# Patient Record
Sex: FEMALE | Race: OTHER | Employment: OTHER | URBAN - METROPOLITAN AREA
[De-identification: names, ages, dates, MRNs, and addresses within clinical notes are randomized per-mention and may not be internally consistent; named-entity substitution may affect disease eponyms.]

---

## 2017-01-02 ENCOUNTER — APPOINTMENT (OUTPATIENT)
Dept: GENERAL RADIOLOGY | Facility: HOSPITAL | Age: 67
End: 2017-01-02

## 2017-01-02 ENCOUNTER — HOSPITAL ENCOUNTER (EMERGENCY)
Facility: HOSPITAL | Age: 67
Discharge: HOME | End: 2017-01-02

## 2017-01-02 VITALS
RESPIRATION RATE: 18 BRPM | DIASTOLIC BLOOD PRESSURE: 76 MMHG | TEMPERATURE: 98 F | HEART RATE: 60 BPM | WEIGHT: 140 LBS | SYSTOLIC BLOOD PRESSURE: 153 MMHG | HEIGHT: 63 IN | OXYGEN SATURATION: 96 % | BODY MASS INDEX: 24.8 KG/M2

## 2017-01-02 DIAGNOSIS — R07.9 CHEST PAIN OF UNCERTAIN ETIOLOGY: Primary | ICD-10-CM

## 2017-01-02 DIAGNOSIS — R11.2 NON-INTRACTABLE VOMITING WITH NAUSEA, UNSPECIFIED VOMITING TYPE: ICD-10-CM

## 2017-01-02 LAB
ANION GAP SERPL CALC-SCNC: 8 MMOL/L (ref 0–18)
BASOPHILS # BLD: 0.1 K/UL (ref 0–0.2)
BASOPHILS NFR BLD: 1 %
BUN SERPL-MCNC: 9 MG/DL (ref 8–20)
BUN/CREAT SERPL: 18.4 (ref 10–20)
CALCIUM SERPL-MCNC: 8.5 MG/DL (ref 8.5–10.5)
CHLORIDE SERPL-SCNC: 102 MMOL/L (ref 95–110)
CO2 SERPL-SCNC: 23 MMOL/L (ref 22–32)
CREAT SERPL-MCNC: 0.49 MG/DL (ref 0.5–1.5)
EOSINOPHIL # BLD: 0.2 K/UL (ref 0–0.7)
EOSINOPHIL NFR BLD: 2 %
ERYTHROCYTE [DISTWIDTH] IN BLOOD BY AUTOMATED COUNT: 13.3 % (ref 11–15)
GLUCOSE SERPL-MCNC: 107 MG/DL (ref 70–99)
HCT VFR BLD AUTO: 38.9 % (ref 35–48)
HGB BLD-MCNC: 13 G/DL (ref 12–16)
LYMPHOCYTES # BLD: 2.7 K/UL (ref 1–4)
LYMPHOCYTES NFR BLD: 32 %
MCH RBC QN AUTO: 30 PG (ref 27–32)
MCHC RBC AUTO-ENTMCNC: 33.4 G/DL (ref 32–37)
MCV RBC AUTO: 89.8 FL (ref 80–100)
MONOCYTES # BLD: 0.6 K/UL (ref 0–1)
MONOCYTES NFR BLD: 8 %
NEUTROPHILS # BLD AUTO: 4.8 K/UL (ref 1.8–7.7)
NEUTROPHILS NFR BLD: 57 %
OSMOLALITY UR CALC.SUM OF ELEC: 275 MOSM/KG (ref 275–295)
PLATELET # BLD AUTO: 250 K/UL (ref 140–400)
PMV BLD AUTO: 7.7 FL (ref 7.4–10.3)
POTASSIUM SERPL-SCNC: 3.5 MMOL/L (ref 3.3–5.1)
RBC # BLD AUTO: 4.33 M/UL (ref 3.7–5.4)
SODIUM SERPL-SCNC: 133 MMOL/L (ref 136–144)
TROPONIN I SERPL-MCNC: 0 NG/ML (ref ?–0.03)
TROPONIN I SERPL-MCNC: 0.01 NG/ML (ref ?–0.03)
WBC # BLD AUTO: 8.4 K/UL (ref 4–11)

## 2017-01-02 PROCEDURE — 93010 ELECTROCARDIOGRAM REPORT: CPT | Performed by: INTERNAL MEDICINE

## 2017-01-02 PROCEDURE — 93010 ELECTROCARDIOGRAM REPORT: CPT | Performed by: EMERGENCY MEDICINE

## 2017-01-02 PROCEDURE — 84484 ASSAY OF TROPONIN QUANT: CPT

## 2017-01-02 PROCEDURE — 85025 COMPLETE CBC W/AUTO DIFF WBC: CPT

## 2017-01-02 PROCEDURE — 80048 BASIC METABOLIC PNL TOTAL CA: CPT

## 2017-01-02 PROCEDURE — 93005 ELECTROCARDIOGRAM TRACING: CPT

## 2017-01-02 PROCEDURE — 84484 ASSAY OF TROPONIN QUANT: CPT | Performed by: EMERGENCY MEDICINE

## 2017-01-02 PROCEDURE — 99285 EMERGENCY DEPT VISIT HI MDM: CPT

## 2017-01-02 PROCEDURE — 96361 HYDRATE IV INFUSION ADD-ON: CPT

## 2017-01-02 PROCEDURE — 96374 THER/PROPH/DIAG INJ IV PUSH: CPT

## 2017-01-02 PROCEDURE — 71020 XR CHEST PA + LAT CHEST (CPT=71020): CPT

## 2017-01-02 RX ORDER — ONDANSETRON 4 MG/1
4 TABLET, ORALLY DISINTEGRATING ORAL EVERY 4 HOURS PRN
Qty: 15 TABLET | Refills: 0 | Status: SHIPPED | OUTPATIENT
Start: 2017-01-02 | End: 2017-02-15 | Stop reason: ALTCHOICE

## 2017-01-02 RX ORDER — ONDANSETRON 2 MG/ML
4 INJECTION INTRAMUSCULAR; INTRAVENOUS ONCE
Status: COMPLETED | OUTPATIENT
Start: 2017-01-02 | End: 2017-01-02

## 2017-01-02 NOTE — ED INITIAL ASSESSMENT (HPI)
Pt c/o dizziness, weakness, chest pain and left shoulder pain, denies sweating denies sob c/o vomit x 1 nausea now

## 2017-01-03 ENCOUNTER — TELEPHONE (OUTPATIENT)
Dept: INTERNAL MEDICINE CLINIC | Facility: CLINIC | Age: 67
End: 2017-01-03

## 2017-01-03 NOTE — ED PROVIDER NOTES
Patient Seen in: [facility] AND [facility] Emergency Department    History   Patient presents with:  Chest Pain Angina (cardiovascular)      HPI    Patient presents complaining of intermittent vomiting over the past several days, nausea, mild weakness and dizzin 38   • Cancer Paternal Uncle      brain   • Ovarian Cancer Sister      25       Smoking Status: Social History    Marital Status:              Spouse Name:                       Years of Education:                 Number of children:               S normal.   Eyes: Conjunctivae are normal. Right eye exhibits no discharge. Left eye exhibits no discharge. Neck: Normal range of motion. Neck supple. Cardiovascular: Normal rate, regular rhythm and intact distal pulses. No murmur heard.   Pulmonary/Ch Scoliosis and degenerative changes in the thoracic spine and upper lumbar spine.          MDM     Pulse Ox: 96%, Room air, Normal     Monitor Interpretation:   normal sinus rhythm    Differential Diagnosis: Vomiting, gastroenteritis, chest pain, atypical ch

## 2017-01-03 NOTE — TELEPHONE ENCOUNTER
Patient was in ER over weekend, please call to schedule patient with me in the next 1 week (sooner if she is not feeling well)

## 2017-01-03 NOTE — ED NOTES
Care assumed. Assessment completed. Alert and interactive. Hemodynamically stable. States recent episodes of palpitations for which she is following up with a cardiologist  - states today anterior CP that radiates to L shoulder with associated N/V.  Denies

## 2017-01-04 NOTE — TELEPHONE ENCOUNTER
Spoke to pt. She is feeling better since her visit to ER. She does not want to make appt with Dr Debara Bosworth till after she sees her GI DR.  She will call us than  To clinical

## 2017-01-05 ENCOUNTER — TELEPHONE (OUTPATIENT)
Dept: GASTROENTEROLOGY | Facility: CLINIC | Age: 67
End: 2017-01-05

## 2017-01-07 ENCOUNTER — TELEPHONE (OUTPATIENT)
Dept: FAMILY MEDICINE CLINIC | Facility: CLINIC | Age: 67
End: 2017-01-07

## 2017-01-09 RX ORDER — TIMOLOL MALEATE 10 MG/1
TABLET ORAL
Qty: 180 TABLET | Refills: 0 | Status: SHIPPED | OUTPATIENT
Start: 2017-01-09 | End: 2017-01-31

## 2017-01-09 RX ORDER — OMEPRAZOLE 20 MG/1
CAPSULE, DELAYED RELEASE ORAL
Qty: 90 CAPSULE | Refills: 0 | Status: SHIPPED | OUTPATIENT
Start: 2017-01-09 | End: 2017-01-31

## 2017-01-11 ENCOUNTER — PRIOR ORIGINAL RECORDS (OUTPATIENT)
Dept: OTHER | Age: 67
End: 2017-01-11

## 2017-01-11 ENCOUNTER — MYAURORA ACCOUNT LINK (OUTPATIENT)
Dept: OTHER | Age: 67
End: 2017-01-11

## 2017-01-12 ENCOUNTER — MYAURORA ACCOUNT LINK (OUTPATIENT)
Dept: OTHER | Age: 67
End: 2017-01-12

## 2017-01-16 ENCOUNTER — PRIOR ORIGINAL RECORDS (OUTPATIENT)
Dept: OTHER | Age: 67
End: 2017-01-16

## 2017-01-18 ENCOUNTER — LAB REQUISITION (OUTPATIENT)
Dept: LAB | Facility: HOSPITAL | Age: 67
End: 2017-01-18
Payer: COMMERCIAL

## 2017-01-18 ENCOUNTER — TELEPHONE (OUTPATIENT)
Dept: GASTROENTEROLOGY | Facility: CLINIC | Age: 67
End: 2017-01-18

## 2017-01-18 DIAGNOSIS — Z01.89 ENCOUNTER FOR OTHER SPECIFIED SPECIAL EXAMINATIONS: ICD-10-CM

## 2017-01-18 PROCEDURE — 88305 TISSUE EXAM BY PATHOLOGIST: CPT | Performed by: INTERNAL MEDICINE

## 2017-01-18 PROCEDURE — 88312 SPECIAL STAINS GROUP 1: CPT | Performed by: INTERNAL MEDICINE

## 2017-01-18 NOTE — TELEPHONE ENCOUNTER
Post colonoscopy and egd([facility]):    Pre: colon screening, chronic heartburn and GERD  Post: diverticulosis (left) , mild melanosis coli, small int hemorrhoids, gastritis s/p biopsies, hiatal hernia.      GI rn please recall colonoscopy for 10 years

## 2017-01-20 ENCOUNTER — PRIOR ORIGINAL RECORDS (OUTPATIENT)
Dept: OTHER | Age: 67
End: 2017-01-20

## 2017-01-20 ENCOUNTER — TELEPHONE (OUTPATIENT)
Dept: GASTROENTEROLOGY | Facility: CLINIC | Age: 67
End: 2017-01-20

## 2017-01-31 RX ORDER — OMEPRAZOLE 20 MG/1
CAPSULE, DELAYED RELEASE ORAL
Qty: 90 CAPSULE | Refills: 0 | Status: SHIPPED | OUTPATIENT
Start: 2017-01-31 | End: 2017-04-27

## 2017-01-31 RX ORDER — TIMOLOL MALEATE 10 MG/1
TABLET ORAL
Qty: 180 TABLET | Refills: 0 | Status: SHIPPED | OUTPATIENT
Start: 2017-01-31 | End: 2017-04-27

## 2017-01-31 NOTE — TELEPHONE ENCOUNTER
Scripts resent to [facility] per Pt instructions as they were previously done by her old PCP and sent to [facility] which Pt no longer uses

## 2017-01-31 NOTE — TELEPHONE ENCOUNTER
Pt now sees Dr Brigette Amezcua for pcp and use [facility] mail  Rx refill request for Omeprazole cap 20 mg & Timolol mal tab 10 mg  To Rx

## 2017-02-08 RX ORDER — PREDNISONE 1 MG/1
TABLET ORAL
Qty: 90 TABLET | Refills: 0 | Status: SHIPPED | OUTPATIENT
Start: 2017-02-08 | End: 2017-03-03

## 2017-02-11 ENCOUNTER — LAB ENCOUNTER (OUTPATIENT)
Dept: LAB | Facility: HOSPITAL | Age: 67
End: 2017-02-11
Attending: INTERNAL MEDICINE
Payer: COMMERCIAL

## 2017-02-11 DIAGNOSIS — M06.09 RHEUMATOID ARTHRITIS OF MULTIPLE SITES WITH NEGATIVE RHEUMATOID FACTOR (HCC): ICD-10-CM

## 2017-02-11 DIAGNOSIS — Z51.81 ENCOUNTER FOR THERAPEUTIC DRUG MONITORING: ICD-10-CM

## 2017-02-11 LAB
ALBUMIN SERPL BCP-MCNC: 3.2 G/DL (ref 3.5–4.8)
AST SERPL-CCNC: 19 U/L (ref 15–41)
BASOPHILS # BLD: 0 K/UL (ref 0–0.2)
BASOPHILS NFR BLD: 1 %
CREAT SERPL-MCNC: 0.8 MG/DL (ref 0.5–1.5)
CRP SERPL-MCNC: <0.5 MG/DL (ref 0–0.9)
EOSINOPHIL # BLD: 0.1 K/UL (ref 0–0.7)
EOSINOPHIL NFR BLD: 2 %
ERYTHROCYTE [DISTWIDTH] IN BLOOD BY AUTOMATED COUNT: 13.6 % (ref 11–15)
ERYTHROCYTE [SEDIMENTATION RATE] IN BLOOD: 14 MM/HR (ref 0–30)
HCT VFR BLD AUTO: 38.3 % (ref 35–48)
HGB BLD-MCNC: 12.7 G/DL (ref 12–16)
LYMPHOCYTES # BLD: 2.6 K/UL (ref 1–4)
LYMPHOCYTES NFR BLD: 39 %
MCH RBC QN AUTO: 30.5 PG (ref 27–32)
MCHC RBC AUTO-ENTMCNC: 33.1 G/DL (ref 32–37)
MCV RBC AUTO: 92.1 FL (ref 80–100)
MONOCYTES # BLD: 0.6 K/UL (ref 0–1)
MONOCYTES NFR BLD: 9 %
NEUTROPHILS # BLD AUTO: 3.3 K/UL (ref 1.8–7.7)
NEUTROPHILS NFR BLD: 50 %
PLATELET # BLD AUTO: 244 K/UL (ref 140–400)
PMV BLD AUTO: 8.5 FL (ref 7.4–10.3)
RBC # BLD AUTO: 4.16 M/UL (ref 3.7–5.4)
WBC # BLD AUTO: 6.6 K/UL (ref 4–11)

## 2017-02-11 PROCEDURE — 82565 ASSAY OF CREATININE: CPT

## 2017-02-11 PROCEDURE — 85025 COMPLETE CBC W/AUTO DIFF WBC: CPT

## 2017-02-11 PROCEDURE — 82040 ASSAY OF SERUM ALBUMIN: CPT

## 2017-02-11 PROCEDURE — 85652 RBC SED RATE AUTOMATED: CPT

## 2017-02-11 PROCEDURE — 36415 COLL VENOUS BLD VENIPUNCTURE: CPT

## 2017-02-11 PROCEDURE — 86140 C-REACTIVE PROTEIN: CPT

## 2017-02-11 PROCEDURE — 84450 TRANSFERASE (AST) (SGOT): CPT

## 2017-02-15 ENCOUNTER — TELEPHONE (OUTPATIENT)
Dept: INTERNAL MEDICINE CLINIC | Facility: CLINIC | Age: 67
End: 2017-02-15

## 2017-02-15 ENCOUNTER — OFFICE VISIT (OUTPATIENT)
Dept: INTERNAL MEDICINE CLINIC | Facility: CLINIC | Age: 67
End: 2017-02-15

## 2017-02-15 VITALS
OXYGEN SATURATION: 95 % | HEART RATE: 75 BPM | BODY MASS INDEX: 24.1 KG/M2 | HEIGHT: 63 IN | SYSTOLIC BLOOD PRESSURE: 118 MMHG | WEIGHT: 136 LBS | TEMPERATURE: 98 F | DIASTOLIC BLOOD PRESSURE: 78 MMHG

## 2017-02-15 DIAGNOSIS — R93.2 ABNORMAL ULTRASOUND OF LIVER: Primary | ICD-10-CM

## 2017-02-15 DIAGNOSIS — N39.41 URGE INCONTINENCE: Primary | ICD-10-CM

## 2017-02-15 LAB
APPEARANCE: CLEAR
MULTISTIX LOT#: NORMAL NUMERIC
PH, URINE: 6 (ref 4.5–8)
SPECIFIC GRAVITY: 1.01 (ref 1–1.03)
URINE-COLOR: YELLOW

## 2017-02-15 PROCEDURE — 99213 OFFICE O/P EST LOW 20 MIN: CPT | Performed by: INTERNAL MEDICINE

## 2017-02-15 PROCEDURE — 81002 URINALYSIS NONAUTO W/O SCOPE: CPT | Performed by: INTERNAL MEDICINE

## 2017-02-15 RX ORDER — OXYBUTYNIN CHLORIDE 5 MG/1
5 TABLET, EXTENDED RELEASE ORAL DAILY
Qty: 30 TABLET | Refills: 0 | Status: SHIPPED | OUTPATIENT
Start: 2017-02-15 | End: 2017-03-20

## 2017-02-15 NOTE — TELEPHONE ENCOUNTER
Spoke with Lori Ward in medical records, Requested Stress test to be faxed to Dr. Julissa Hidalgo. Awaiting fax.

## 2017-02-15 NOTE — PROGRESS NOTES
Tien Bhakta is a 78year old female. Patient presents with:  Hospital F/U      HPI:   Tien Bhakta is a 78year old female who presents for: follow up from egd/colonoscopy    Underwent egd/colnoscopy with Dr. Octavio Chaudhary 1/2017.  Found to have diverticulosis, melano fusion and  instrumentation minimal invasive at L4-L5 with laminectomy, L4-L5 posterolateral fusion. Dr. Mills Primrose  6/22/04    Comment Procedure for prolapsed hemorrhoids; both external and internal complicated hemorrhoids. refer to urogynecology for further evaluation.   - Oxybutynin Chloride ER 5 MG Oral Tablet 24 Hr; Take 1 tablet (5 mg total) by mouth daily. Dispense: 30 tablet;  Refill: 0      Heri Garcia DO  2/15/2017  8:43 AM

## 2017-02-16 RX ORDER — OXYBUTYNIN CHLORIDE 5 MG/1
TABLET, EXTENDED RELEASE ORAL
Qty: 90 TABLET | Refills: 0 | OUTPATIENT
Start: 2017-02-16

## 2017-02-16 NOTE — TELEPHONE ENCOUNTER
Current refill request refused due to refill is either a duplicate request or has active refills at the pharmacy. Check previous templates.

## 2017-02-26 NOTE — TELEPHONE ENCOUNTER
Review of records from [facility]:  Nuclear stress 1/2017 normal, LVEF 79%  US shows possible abnormality on the liver; recommend follow up with Liver US.

## 2017-03-03 ENCOUNTER — OFFICE VISIT (OUTPATIENT)
Dept: RHEUMATOLOGY | Facility: CLINIC | Age: 67
End: 2017-03-03

## 2017-03-03 VITALS
DIASTOLIC BLOOD PRESSURE: 76 MMHG | HEIGHT: 63.5 IN | HEART RATE: 75 BPM | SYSTOLIC BLOOD PRESSURE: 124 MMHG | BODY MASS INDEX: 24.18 KG/M2 | WEIGHT: 138.19 LBS

## 2017-03-03 DIAGNOSIS — M15.9 PRIMARY OSTEOARTHRITIS INVOLVING MULTIPLE JOINTS: ICD-10-CM

## 2017-03-03 DIAGNOSIS — Z51.81 ENCOUNTER FOR THERAPEUTIC DRUG MONITORING: ICD-10-CM

## 2017-03-03 DIAGNOSIS — M06.09 RHEUMATOID ARTHRITIS OF MULTIPLE SITES WITH NEGATIVE RHEUMATOID FACTOR (HCC): Primary | ICD-10-CM

## 2017-03-03 PROCEDURE — 99212 OFFICE O/P EST SF 10 MIN: CPT | Performed by: INTERNAL MEDICINE

## 2017-03-03 PROCEDURE — 99214 OFFICE O/P EST MOD 30 MIN: CPT | Performed by: INTERNAL MEDICINE

## 2017-03-03 RX ORDER — LEFLUNOMIDE 20 MG/1
TABLET ORAL
Qty: 30 TABLET | Refills: 5 | Status: SHIPPED | OUTPATIENT
Start: 2017-03-03 | End: 2017-06-10

## 2017-03-03 RX ORDER — PREDNISONE 1 MG/1
TABLET ORAL
Qty: 140 TABLET | Refills: 0 | Status: SHIPPED | OUTPATIENT
Start: 2017-03-03 | End: 2017-06-10 | Stop reason: ALTCHOICE

## 2017-03-03 NOTE — PROGRESS NOTES
HPI:    Patient ID: Koki Ramirez is a 78year old female. HPI Comments: Eliza Sanders is a 44-year-old woman with chronic rheumatoid arthritis. I last saw her August 9th of 2016, when she had a severe flare, off of disease modifying drug.  Since then, she has bee tablet Rfl: 0   omeprazole 20 MG Oral Capsule Delayed Release Take 1 capsule by mouth  every day before a meal Disp: 90 capsule Rfl: 0   MELOXICAM 15 MG Oral Tab TAKE 1 TABLET(15 MG) BY MOUTH EVERY DAY Disp: 30 tablet Rfl: 0   Nortriptyline HCl 75 MG Oral & Referrals:  None       #[id number]

## 2017-03-13 ENCOUNTER — HOSPITAL ENCOUNTER (OUTPATIENT)
Dept: ULTRASOUND IMAGING | Facility: HOSPITAL | Age: 67
Discharge: HOME | End: 2017-03-13
Attending: INTERNAL MEDICINE
Payer: COMMERCIAL

## 2017-03-13 DIAGNOSIS — R93.2 ABNORMAL ULTRASOUND OF LIVER: ICD-10-CM

## 2017-03-13 PROCEDURE — 76705 ECHO EXAM OF ABDOMEN: CPT

## 2017-03-17 ENCOUNTER — TELEPHONE (OUTPATIENT)
Dept: INTERNAL MEDICINE CLINIC | Facility: CLINIC | Age: 67
End: 2017-03-17

## 2017-03-24 RX ORDER — OXYBUTYNIN CHLORIDE 5 MG/1
TABLET, EXTENDED RELEASE ORAL
Qty: 90 TABLET | Refills: 3 | Status: SHIPPED | OUTPATIENT
Start: 2017-03-24 | End: 2017-06-10

## 2017-04-27 NOTE — TELEPHONE ENCOUNTER
Pt is calling regarding Timolol 10 mg, pt would like to increase this to 3 times daily, please call pt [phone]    Tasked to nursing

## 2017-04-29 RX ORDER — TIMOLOL MALEATE 10 MG/1
10 TABLET ORAL 3 TIMES DAILY
Qty: 270 TABLET | Refills: 3 | Status: SHIPPED | OUTPATIENT
Start: 2017-04-29 | End: 2018-01-24

## 2017-04-29 RX ORDER — OMEPRAZOLE 20 MG/1
CAPSULE, DELAYED RELEASE ORAL
Qty: 90 CAPSULE | Refills: 3 | Status: SHIPPED | OUTPATIENT
Start: 2017-04-29 | End: 2018-01-24

## 2017-05-23 RX ORDER — NORTRIPTYLINE HYDROCHLORIDE 50 MG/1
CAPSULE ORAL
Qty: 90 CAPSULE | Refills: 0 | Status: SHIPPED | OUTPATIENT
Start: 2017-05-23 | End: 2017-07-21

## 2017-05-23 NOTE — TELEPHONE ENCOUNTER
Patient is out of medication/doesn't know the name  \"the one she takes at night to help her sleep better\"  Asks for refill to be sent today to [facility] in [place]    Can be reached at [phone] if there are any questions

## 2017-05-26 RX ORDER — NORTRIPTYLINE HYDROCHLORIDE 75 MG/1
75 CAPSULE ORAL NIGHTLY
Qty: 90 CAPSULE | Refills: 1 | Status: SHIPPED | OUTPATIENT
Start: 2017-05-26 | End: 2017-12-13

## 2017-05-26 RX ORDER — NORTRIPTYLINE HYDROCHLORIDE 75 MG/1
CAPSULE ORAL
Qty: 90 CAPSULE | Refills: 0 | OUTPATIENT
Start: 2017-05-26

## 2017-06-10 ENCOUNTER — OFFICE VISIT (OUTPATIENT)
Dept: RHEUMATOLOGY | Facility: CLINIC | Age: 67
End: 2017-06-10

## 2017-06-10 ENCOUNTER — LAB ENCOUNTER (OUTPATIENT)
Dept: LAB | Facility: HOSPITAL | Age: 67
End: 2017-06-10
Attending: INTERNAL MEDICINE
Payer: COMMERCIAL

## 2017-06-10 VITALS
DIASTOLIC BLOOD PRESSURE: 76 MMHG | BODY MASS INDEX: 23.42 KG/M2 | WEIGHT: 132.19 LBS | SYSTOLIC BLOOD PRESSURE: 122 MMHG | HEART RATE: 77 BPM | HEIGHT: 63 IN

## 2017-06-10 DIAGNOSIS — Z51.81 ENCOUNTER FOR THERAPEUTIC DRUG MONITORING: ICD-10-CM

## 2017-06-10 DIAGNOSIS — M06.09 RHEUMATOID ARTHRITIS OF MULTIPLE SITES WITH NEGATIVE RHEUMATOID FACTOR (HCC): ICD-10-CM

## 2017-06-10 DIAGNOSIS — M06.09 RHEUMATOID ARTHRITIS OF MULTIPLE SITES WITH NEGATIVE RHEUMATOID FACTOR (HCC): Primary | ICD-10-CM

## 2017-06-10 DIAGNOSIS — M15.9 PRIMARY OSTEOARTHRITIS INVOLVING MULTIPLE JOINTS: ICD-10-CM

## 2017-06-10 PROCEDURE — 86140 C-REACTIVE PROTEIN: CPT

## 2017-06-10 PROCEDURE — 84450 TRANSFERASE (AST) (SGOT): CPT

## 2017-06-10 PROCEDURE — 82040 ASSAY OF SERUM ALBUMIN: CPT

## 2017-06-10 PROCEDURE — 85652 RBC SED RATE AUTOMATED: CPT

## 2017-06-10 PROCEDURE — 85025 COMPLETE CBC W/AUTO DIFF WBC: CPT

## 2017-06-10 PROCEDURE — 36415 COLL VENOUS BLD VENIPUNCTURE: CPT

## 2017-06-10 PROCEDURE — 99214 OFFICE O/P EST MOD 30 MIN: CPT | Performed by: INTERNAL MEDICINE

## 2017-06-10 PROCEDURE — 99212 OFFICE O/P EST SF 10 MIN: CPT | Performed by: INTERNAL MEDICINE

## 2017-06-10 PROCEDURE — 82565 ASSAY OF CREATININE: CPT

## 2017-06-10 RX ORDER — LEFLUNOMIDE 20 MG/1
TABLET ORAL
Qty: 90 TABLET | Refills: 1 | Status: SHIPPED | OUTPATIENT
Start: 2017-06-10 | End: 2019-06-05

## 2017-07-21 ENCOUNTER — OFFICE VISIT (OUTPATIENT)
Dept: INTERNAL MEDICINE CLINIC | Facility: CLINIC | Age: 67
End: 2017-07-21

## 2017-07-21 VITALS
HEART RATE: 67 BPM | RESPIRATION RATE: 18 BRPM | BODY MASS INDEX: 23.57 KG/M2 | OXYGEN SATURATION: 97 % | SYSTOLIC BLOOD PRESSURE: 116 MMHG | DIASTOLIC BLOOD PRESSURE: 72 MMHG | WEIGHT: 133 LBS | TEMPERATURE: 99 F | HEIGHT: 63 IN

## 2017-07-21 DIAGNOSIS — R22.31 SKIN LUMP OF ARM, RIGHT: Primary | ICD-10-CM

## 2017-07-21 DIAGNOSIS — D17.22 LIPOMA OF LEFT UPPER EXTREMITY: ICD-10-CM

## 2017-07-21 PROCEDURE — 99212 OFFICE O/P EST SF 10 MIN: CPT | Performed by: INTERNAL MEDICINE

## 2017-07-21 PROCEDURE — 99213 OFFICE O/P EST LOW 20 MIN: CPT | Performed by: INTERNAL MEDICINE

## 2017-07-21 NOTE — PROGRESS NOTES
Blanca Bond is a 78year old female. Patient presents with:  Trauma: Pt states she dropped a glass bottle on her right wrist 3 weeks ago and has a cut. Area very tender to touch like something is still in there. Cut is scabbed over with bump.   Lump: Pt noti He Ovalles  2013: BREAST SURGERY Bilateral      Comment: for cosmetic reason  1991: HYSTERECTOMY      Comment: fibroids  2004: KNEE ARTHROSCOPY Right  2004 & 2000: KNEE ARTHROSCOPY Left      Comment: arthroscope x2  10/29/01: Hieu Couch shoulder lipoma  Asymptomatic, no tx at this time.    Toshia Mcclelland DO  7/21/2017  4:41 PM

## 2017-07-29 ENCOUNTER — LAB ENCOUNTER (OUTPATIENT)
Dept: LAB | Facility: HOSPITAL | Age: 67
End: 2017-07-29
Attending: INTERNAL MEDICINE
Payer: COMMERCIAL

## 2017-07-29 DIAGNOSIS — M06.09 RHEUMATOID ARTHRITIS OF MULTIPLE SITES WITH NEGATIVE RHEUMATOID FACTOR (HCC): ICD-10-CM

## 2017-07-29 DIAGNOSIS — Z51.81 ENCOUNTER FOR THERAPEUTIC DRUG MONITORING: ICD-10-CM

## 2017-07-29 LAB
ALBUMIN SERPL BCP-MCNC: 3.6 G/DL (ref 3.5–4.8)
AST SERPL-CCNC: 21 U/L (ref 15–41)
BASOPHILS # BLD: 0 K/UL (ref 0–0.2)
BASOPHILS NFR BLD: 1 %
CREAT SERPL-MCNC: 0.63 MG/DL (ref 0.5–1.5)
CRP SERPL-MCNC: 1.5 MG/DL (ref 0–0.9)
EOSINOPHIL # BLD: 0.2 K/UL (ref 0–0.7)
EOSINOPHIL NFR BLD: 3 %
ERYTHROCYTE [DISTWIDTH] IN BLOOD BY AUTOMATED COUNT: 13.7 % (ref 11–15)
ERYTHROCYTE [SEDIMENTATION RATE] IN BLOOD: 25 MM/HR (ref 0–30)
HCT VFR BLD AUTO: 39.2 % (ref 35–48)
HGB BLD-MCNC: 13.1 G/DL (ref 12–16)
LYMPHOCYTES # BLD: 2.2 K/UL (ref 1–4)
LYMPHOCYTES NFR BLD: 34 %
MCH RBC QN AUTO: 29.9 PG (ref 27–32)
MCHC RBC AUTO-ENTMCNC: 33.3 G/DL (ref 32–37)
MCV RBC AUTO: 89.6 FL (ref 80–100)
MONOCYTES # BLD: 0.6 K/UL (ref 0–1)
MONOCYTES NFR BLD: 10 %
NEUTROPHILS # BLD AUTO: 3.5 K/UL (ref 1.8–7.7)
NEUTROPHILS NFR BLD: 53 %
PLATELET # BLD AUTO: 225 K/UL (ref 140–400)
PMV BLD AUTO: 7.9 FL (ref 7.4–10.3)
RBC # BLD AUTO: 4.37 M/UL (ref 3.7–5.4)
WBC # BLD AUTO: 6.5 K/UL (ref 4–11)

## 2017-07-29 PROCEDURE — 82040 ASSAY OF SERUM ALBUMIN: CPT

## 2017-07-29 PROCEDURE — 82565 ASSAY OF CREATININE: CPT

## 2017-07-29 PROCEDURE — 85652 RBC SED RATE AUTOMATED: CPT

## 2017-07-29 PROCEDURE — 36415 COLL VENOUS BLD VENIPUNCTURE: CPT

## 2017-07-29 PROCEDURE — 84450 TRANSFERASE (AST) (SGOT): CPT

## 2017-07-29 PROCEDURE — 86140 C-REACTIVE PROTEIN: CPT

## 2017-07-29 PROCEDURE — 85025 COMPLETE CBC W/AUTO DIFF WBC: CPT

## 2017-10-30 ENCOUNTER — TELEPHONE (OUTPATIENT)
Dept: RHEUMATOLOGY | Facility: CLINIC | Age: 67
End: 2017-10-30

## 2017-10-30 DIAGNOSIS — M06.09 RHEUMATOID ARTHRITIS OF MULTIPLE SITES WITHOUT RHEUMATOID FACTOR (HCC): Primary | ICD-10-CM

## 2017-10-30 DIAGNOSIS — Z51.81 ENCOUNTER FOR THERAPEUTIC DRUG MONITORING: ICD-10-CM

## 2017-10-31 ENCOUNTER — LAB ENCOUNTER (OUTPATIENT)
Dept: LAB | Facility: HOSPITAL | Age: 67
End: 2017-10-31
Attending: INTERNAL MEDICINE
Payer: MEDICARE

## 2017-10-31 DIAGNOSIS — M06.09 RHEUMATOID ARTHRITIS OF MULTIPLE SITES WITHOUT RHEUMATOID FACTOR (HCC): ICD-10-CM

## 2017-10-31 DIAGNOSIS — Z51.81 ENCOUNTER FOR THERAPEUTIC DRUG MONITORING: ICD-10-CM

## 2017-10-31 PROCEDURE — 84450 TRANSFERASE (AST) (SGOT): CPT

## 2017-10-31 PROCEDURE — 82565 ASSAY OF CREATININE: CPT

## 2017-10-31 PROCEDURE — 85025 COMPLETE CBC W/AUTO DIFF WBC: CPT

## 2017-10-31 PROCEDURE — 82040 ASSAY OF SERUM ALBUMIN: CPT

## 2017-10-31 PROCEDURE — 85652 RBC SED RATE AUTOMATED: CPT

## 2017-10-31 PROCEDURE — 36415 COLL VENOUS BLD VENIPUNCTURE: CPT

## 2017-10-31 PROCEDURE — 86140 C-REACTIVE PROTEIN: CPT

## 2017-12-13 DIAGNOSIS — R53.83 FATIGUE, UNSPECIFIED TYPE: ICD-10-CM

## 2017-12-13 DIAGNOSIS — Z00.00 ANNUAL PHYSICAL EXAM: Primary | ICD-10-CM

## 2017-12-13 RX ORDER — NORTRIPTYLINE HYDROCHLORIDE 75 MG/1
CAPSULE ORAL
Qty: 90 CAPSULE | Refills: 0 | Status: SHIPPED | OUTPATIENT
Start: 2017-12-13 | End: 2018-01-24

## 2018-01-05 NOTE — TELEPHONE ENCOUNTER
to schedule patient for annual physical soon-we are overdue. Would recommend doing fasting blood work prior to this.

## 2018-01-24 ENCOUNTER — OFFICE VISIT (OUTPATIENT)
Dept: INTERNAL MEDICINE CLINIC | Facility: CLINIC | Age: 68
End: 2018-01-24

## 2018-01-24 VITALS
SYSTOLIC BLOOD PRESSURE: 122 MMHG | WEIGHT: 134 LBS | HEIGHT: 63 IN | OXYGEN SATURATION: 98 % | DIASTOLIC BLOOD PRESSURE: 74 MMHG | HEART RATE: 88 BPM | TEMPERATURE: 97 F | BODY MASS INDEX: 23.74 KG/M2

## 2018-01-24 DIAGNOSIS — F32.A DEPRESSION, UNSPECIFIED DEPRESSION TYPE: ICD-10-CM

## 2018-01-24 DIAGNOSIS — I38 VALVULAR HEART DISEASE: ICD-10-CM

## 2018-01-24 DIAGNOSIS — E78.5 DYSLIPIDEMIA: ICD-10-CM

## 2018-01-24 DIAGNOSIS — Z12.39 SCREENING FOR BREAST CANCER: ICD-10-CM

## 2018-01-24 DIAGNOSIS — Z78.0 POSTMENOPAUSAL: ICD-10-CM

## 2018-01-24 DIAGNOSIS — M06.9 RHEUMATOID ARTHRITIS INVOLVING MULTIPLE SITES, UNSPECIFIED RHEUMATOID FACTOR PRESENCE: ICD-10-CM

## 2018-01-24 DIAGNOSIS — N39.3 STRESS INCONTINENCE: ICD-10-CM

## 2018-01-24 DIAGNOSIS — R00.0 TACHYCARDIA: ICD-10-CM

## 2018-01-24 DIAGNOSIS — K44.9 HIATAL HERNIA: ICD-10-CM

## 2018-01-24 DIAGNOSIS — Z00.00 ENCOUNTER FOR ANNUAL HEALTH EXAMINATION: Primary | ICD-10-CM

## 2018-01-24 PROCEDURE — G0439 PPPS, SUBSEQ VISIT: HCPCS | Performed by: INTERNAL MEDICINE

## 2018-01-24 RX ORDER — TIMOLOL MALEATE 10 MG/1
10 TABLET ORAL 3 TIMES DAILY
Qty: 270 TABLET | Refills: 3 | Status: SHIPPED | OUTPATIENT
Start: 2018-01-24 | End: 2019-06-05

## 2018-01-24 RX ORDER — OMEPRAZOLE 20 MG/1
CAPSULE, DELAYED RELEASE ORAL
Qty: 90 CAPSULE | Refills: 3 | Status: SHIPPED | OUTPATIENT
Start: 2018-01-24 | End: 2018-09-25

## 2018-01-24 RX ORDER — OXYBUTYNIN CHLORIDE 5 MG/1
5 TABLET, EXTENDED RELEASE ORAL DAILY
Qty: 90 TABLET | Refills: 3 | Status: SHIPPED | OUTPATIENT
Start: 2018-01-24 | End: 2018-11-21

## 2018-01-24 RX ORDER — NORTRIPTYLINE HYDROCHLORIDE 75 MG/1
CAPSULE ORAL
Qty: 90 CAPSULE | Refills: 3 | Status: SHIPPED | OUTPATIENT
Start: 2018-01-24 | End: 2019-02-25

## 2018-01-24 NOTE — PROGRESS NOTES
HPI:   Christos Hernandes is a 75year old female who presents for a Medicare Subsequent Annual Wellness visit (Pt already had Initial Annual Wellness). Kesha Trevino is a 76year old female with past medical history of RA, GERD.    She was seeing Dr. Rich Corrigan Screening (PHQ-2/PHQ-9): Over the LAST 2 WEEKS   Little interest or pleasure in doing things (over the last two weeks)?: Not at all  Feeling down, depressed, or hopeless (over the last two weeks)?: Not at all  PHQ-2 SCORE: 0     Advanced Directive:   She d HGB 13.1 10/31/2017    10/31/2017        ALLERGIES:   She has No Known Allergies.     CURRENT MEDICATIONS:     Outpatient Prescriptions Marked as Taking for the 18 encounter (Office Visit) with Nestor Feldman DO:  [] Zoster Vac Recomb Adj negative  Eyes: negative  Ears, nose, mouth, throat, and face: negative  Respiratory: negative  Cardiovascular: negative  Gastrointestinal: negative  Genitourinary:negative  Integument/breast: negative  Hematologic/lymphatic: negative  Musculoskeletal:nega Corrected Right Eye Chart Acuity: 20/20   Left Eye Visual Acuity: Corrected Left Eye Chart Acuity: 20/25   Both Eyes Visual Acuity: Corrected Both Eyes Chart Acuity: 20/20   Able To Tolerate Visual Acuity: Yes      General Appearance:  Alert, cooperative, Future    Postmenopausal  -     XR DEXA BONE DENSITOMETRY (CPT=71762); Future    Tachycardia  -     Timolol Maleate 10 MG Oral Tab; Take 1 tablet (10 mg total) by mouth 3 (three) times daily.     Hiatal hernia  -     omeprazole 20 MG Oral Capsule Delayed Re health state?: Good  How do you maintain positive mental well-being?: Visiting Family      This section provided for quick review of chart, separate sheet to patient  [address] Internal Lab or Procedure External Lab or Influenza  Covered Annually 10/26/2016 Please get every year    Pneumococcal 13 (Prevnar)  Covered Once after 65 07/01/2016 Please get once after your 65th birthday    Pneumococcal 23 (Pneumovax)  Covered Once after 65 No vaccine history found Please get o

## 2018-02-04 ENCOUNTER — TELEPHONE (OUTPATIENT)
Dept: INTERNAL MEDICINE CLINIC | Facility: CLINIC | Age: 68
End: 2018-02-04

## 2018-02-04 NOTE — TELEPHONE ENCOUNTER
Please notify patient that after reviewing all her recent tests-she had a mildly leaky valve noted on the US of the heart last January. I placed an order for a repeat US of the heart that she should try to get done soon to see how the valve is doing.      P

## 2018-02-05 NOTE — TELEPHONE ENCOUNTER
Spoke to patient and informed of All of MD recommendations. She repeated and verbalized understanding of our conversation.

## 2018-03-12 ENCOUNTER — HOSPITAL ENCOUNTER (OUTPATIENT)
Dept: BONE DENSITY | Age: 68
Discharge: HOME | End: 2018-03-12
Attending: INTERNAL MEDICINE
Payer: MEDICARE

## 2018-03-12 ENCOUNTER — APPOINTMENT (OUTPATIENT)
Dept: LAB | Age: 68
End: 2018-03-12
Attending: INTERNAL MEDICINE
Payer: MEDICARE

## 2018-03-12 ENCOUNTER — HOSPITAL ENCOUNTER (OUTPATIENT)
Dept: MAMMOGRAPHY | Age: 68
Discharge: HOME | End: 2018-03-12
Attending: INTERNAL MEDICINE
Payer: MEDICARE

## 2018-03-12 DIAGNOSIS — R53.83 FATIGUE, UNSPECIFIED TYPE: ICD-10-CM

## 2018-03-12 DIAGNOSIS — Z78.0 POSTMENOPAUSAL: ICD-10-CM

## 2018-03-12 DIAGNOSIS — Z00.00 ANNUAL PHYSICAL EXAM: ICD-10-CM

## 2018-03-12 DIAGNOSIS — Z12.39 SCREENING FOR BREAST CANCER: ICD-10-CM

## 2018-03-12 LAB
ALBUMIN SERPL BCP-MCNC: 3.8 G/DL (ref 3.5–4.8)
ALBUMIN/GLOB SERPL: 1.1 {RATIO} (ref 1–2)
ALP SERPL-CCNC: 102 U/L (ref 32–100)
ALT SERPL-CCNC: 16 U/L (ref 14–54)
ANION GAP SERPL CALC-SCNC: 7 MMOL/L (ref 0–18)
AST SERPL-CCNC: 23 U/L (ref 15–41)
BILIRUB SERPL-MCNC: 0.6 MG/DL (ref 0.3–1.2)
BUN SERPL-MCNC: 11 MG/DL (ref 8–20)
BUN/CREAT SERPL: 13.3 (ref 10–20)
CALCIUM SERPL-MCNC: 9.4 MG/DL (ref 8.5–10.5)
CHLORIDE SERPL-SCNC: 102 MMOL/L (ref 95–110)
CHOLEST SERPL-MCNC: 195 MG/DL (ref 110–200)
CO2 SERPL-SCNC: 28 MMOL/L (ref 22–32)
CREAT SERPL-MCNC: 0.83 MG/DL (ref 0.5–1.5)
GLOBULIN PLAS-MCNC: 3.4 G/DL (ref 2.5–3.7)
GLUCOSE SERPL-MCNC: 102 MG/DL (ref 70–99)
HDLC SERPL-MCNC: 44 MG/DL
LDLC SERPL CALC-MCNC: 129 MG/DL (ref 0–99)
NONHDLC SERPL-MCNC: 151 MG/DL
OSMOLALITY UR CALC.SUM OF ELEC: 284 MOSM/KG (ref 275–295)
PATIENT FASTING: YES
POTASSIUM SERPL-SCNC: 4.8 MMOL/L (ref 3.3–5.1)
PROT SERPL-MCNC: 7.2 G/DL (ref 5.9–8.4)
SODIUM SERPL-SCNC: 137 MMOL/L (ref 136–144)
TRIGL SERPL-MCNC: 111 MG/DL (ref 1–149)
TSH SERPL-ACNC: 3.39 UIU/ML (ref 0.45–5.33)

## 2018-03-12 PROCEDURE — 77067 SCR MAMMO BI INCL CAD: CPT | Performed by: INTERNAL MEDICINE

## 2018-03-12 PROCEDURE — 36415 COLL VENOUS BLD VENIPUNCTURE: CPT

## 2018-03-12 PROCEDURE — 80061 LIPID PANEL: CPT

## 2018-03-12 PROCEDURE — 77080 DXA BONE DENSITY AXIAL: CPT | Performed by: INTERNAL MEDICINE

## 2018-03-12 PROCEDURE — 80053 COMPREHEN METABOLIC PANEL: CPT

## 2018-03-12 PROCEDURE — 84443 ASSAY THYROID STIM HORMONE: CPT

## 2018-03-13 ENCOUNTER — TELEPHONE (OUTPATIENT)
Dept: INTERNAL MEDICINE CLINIC | Facility: CLINIC | Age: 68
End: 2018-03-13

## 2018-03-13 NOTE — TELEPHONE ENCOUNTER
To Dr. Tiffanie Rivas - your message relayed to pt who verbalized understanding. Pt states she has appt with cardiologist on Thursday.

## 2018-03-15 ENCOUNTER — HOSPITAL ENCOUNTER (OUTPATIENT)
Dept: CV DIAGNOSTICS | Facility: HOSPITAL | Age: 68
Discharge: HOME | End: 2018-03-15
Attending: INTERNAL MEDICINE
Payer: MEDICARE

## 2018-03-15 DIAGNOSIS — I38 VALVULAR HEART DISEASE: ICD-10-CM

## 2018-03-15 PROCEDURE — 93306 TTE W/DOPPLER COMPLETE: CPT | Performed by: INTERNAL MEDICINE

## 2018-03-28 ENCOUNTER — TELEPHONE (OUTPATIENT)
Dept: INTERNAL MEDICINE CLINIC | Facility: CLINIC | Age: 68
End: 2018-03-28

## 2018-03-28 RX ORDER — PROPRANOLOL HYDROCHLORIDE 20 MG/1
20 TABLET ORAL 3 TIMES DAILY
Qty: 270 TABLET | Refills: 3 | Status: SHIPPED | OUTPATIENT
Start: 2018-03-28 | End: 2019-04-03

## 2018-03-28 NOTE — TELEPHONE ENCOUNTER
Propanolol would likely be cheaper, if she would like, we can try that. If so, I can print out the prescription for her and she can pick it up tomorrow.

## 2018-03-28 NOTE — TELEPHONE ENCOUNTER
Called patient and notified her that script for propranolol is at  for . She verbalized understanding.

## 2018-03-28 NOTE — TELEPHONE ENCOUNTER
Please advise - called patient who needs printed RX for Timolol ( she wants to get the medication in [place] ([facility])) She still has shakiness of head and hands , a little better, but wants to know if there is a different medication , also less expensive - to DR. Oliva Johnson

## 2018-03-28 NOTE — TELEPHONE ENCOUNTER
To  - called patient and relayed DR. KAISER message - she would like to try Propanolol and would like to  RX tomorrow morning

## 2018-05-21 ENCOUNTER — TELEPHONE (OUTPATIENT)
Dept: INTERNAL MEDICINE CLINIC | Facility: CLINIC | Age: 68
End: 2018-05-21

## 2018-05-21 ENCOUNTER — APPOINTMENT (OUTPATIENT)
Dept: LAB | Age: 68
End: 2018-05-21
Attending: INTERNAL MEDICINE
Payer: MEDICARE

## 2018-05-21 DIAGNOSIS — R35.0 URINARY FREQUENCY: Primary | ICD-10-CM

## 2018-05-21 DIAGNOSIS — R35.0 URINARY FREQUENCY: ICD-10-CM

## 2018-05-21 PROCEDURE — 87086 URINE CULTURE/COLONY COUNT: CPT

## 2018-05-21 PROCEDURE — 81001 URINALYSIS AUTO W/SCOPE: CPT

## 2018-05-21 PROCEDURE — 87186 SC STD MICRODIL/AGAR DIL: CPT

## 2018-05-21 PROCEDURE — 87088 URINE BACTERIA CULTURE: CPT

## 2018-05-21 RX ORDER — NITROFURANTOIN 25; 75 MG/1; MG/1
100 CAPSULE ORAL 2 TIMES DAILY
Qty: 10 CAPSULE | Refills: 0 | Status: SHIPPED | OUTPATIENT
Start: 2018-05-21 | End: 2019-03-13 | Stop reason: ALTCHOICE

## 2018-05-21 NOTE — TELEPHONE ENCOUNTER
Pt. Has been urinating frequently and thinks she has an UTI ph.  # [phone]    Routed to clinical

## 2018-05-21 NOTE — TELEPHONE ENCOUNTER
Ordered UA and culture.  I called in an antibiotic for her to start after dropping off the urine test.

## 2018-05-21 NOTE — TELEPHONE ENCOUNTER
Pt reported urinary urgency/frequency and burning with urination starting last night. Denied any fever or back pain. This RN informed her that this will be reviewed with Dr. Libby Carson and she will be called back with instructions.       Routed to Dr. Libby Carson for re

## 2018-05-22 NOTE — TELEPHONE ENCOUNTER
To Dr. Stefania Diallo - Would you please review preliminary urine culture - e. Coli. Pt on Macrobid - see below. OK for pt to stay on macrobid or change?

## 2018-05-23 NOTE — TELEPHONE ENCOUNTER
Pt states she is feeling better, she has 3 more days of abx. Pt notes she still has some discomfort with urination and frequency has improved.  No fever, no back pain, no blood in the urine    To Dr. Lasha Velásquez

## 2018-09-08 DIAGNOSIS — K44.9 HIATAL HERNIA: ICD-10-CM

## 2018-09-08 RX ORDER — OMEPRAZOLE 20 MG/1
CAPSULE, DELAYED RELEASE ORAL
Qty: 90 CAPSULE | Refills: 0 | OUTPATIENT
Start: 2018-09-08

## 2018-09-14 DIAGNOSIS — K44.9 HIATAL HERNIA: ICD-10-CM

## 2018-09-17 RX ORDER — OMEPRAZOLE 20 MG/1
CAPSULE, DELAYED RELEASE ORAL
Qty: 90 CAPSULE | Refills: 1 | OUTPATIENT
Start: 2018-09-17

## 2018-09-25 RX ORDER — OMEPRAZOLE 20 MG/1
CAPSULE, DELAYED RELEASE ORAL
Qty: 90 CAPSULE | Refills: 1 | Status: SHIPPED | OUTPATIENT
Start: 2018-09-25 | End: 2019-04-04

## 2018-09-25 NOTE — TELEPHONE ENCOUNTER
Pt is checking on status of refill, Saul told pt she has no refills at the pharmacy     Tasked to rx

## 2018-11-21 ENCOUNTER — OFFICE VISIT (OUTPATIENT)
Dept: INTERNAL MEDICINE CLINIC | Facility: CLINIC | Age: 68
End: 2018-11-21
Payer: MEDICARE

## 2018-11-21 VITALS
WEIGHT: 132 LBS | OXYGEN SATURATION: 98 % | HEART RATE: 99 BPM | TEMPERATURE: 98 F | DIASTOLIC BLOOD PRESSURE: 86 MMHG | BODY MASS INDEX: 23.39 KG/M2 | SYSTOLIC BLOOD PRESSURE: 128 MMHG | HEIGHT: 63 IN

## 2018-11-21 DIAGNOSIS — R60.0 PERIORBITAL EDEMA: Primary | ICD-10-CM

## 2018-11-21 PROCEDURE — 99213 OFFICE O/P EST LOW 20 MIN: CPT | Performed by: INTERNAL MEDICINE

## 2018-11-21 PROCEDURE — G0463 HOSPITAL OUTPT CLINIC VISIT: HCPCS | Performed by: INTERNAL MEDICINE

## 2018-11-21 NOTE — PROGRESS NOTES
Allan Simpson is a 70year old female. Patient presents with:  Eye Problem: Both of her eyes were very swollen yesterday and today she has pain on the inside corners of both eyes.  She is not sure about her medications, she does not think she is on propranolol • Vocal cord polyp       Past Surgical History:   Procedure Laterality Date   • BACK SURGERY  9/1/11    Posterior spinal fusion and  instrumentation minimal invasive at L4-L5 with laminectomy, L4-L5 posterolateral fusion.  Dr. Osorio Glover   • BREAST thyromegaly, no cervical or supraclavicular LAD  LUNGS: clear to auscultation bilaterally, no wheezing or rhonchi        ASSESSMENT AND PLAN:     1. Periorbital edema  Resolved today; likely allergic in nature.  Encouraged to take benadryl today and to call

## 2018-11-30 ENCOUNTER — TELEPHONE (OUTPATIENT)
Dept: INTERNAL MEDICINE CLINIC | Facility: CLINIC | Age: 68
End: 2018-11-30

## 2018-11-30 DIAGNOSIS — J02.9 PHARYNGITIS, UNSPECIFIED ETIOLOGY: Primary | ICD-10-CM

## 2018-11-30 RX ORDER — FLUTICASONE PROPIONATE 50 MCG
2 SPRAY, SUSPENSION (ML) NASAL DAILY
Qty: 1 BOTTLE | Refills: 3 | Status: SHIPPED | OUTPATIENT
Start: 2018-11-30 | End: 2019-04-23

## 2018-11-30 RX ORDER — AZITHROMYCIN 250 MG/1
TABLET, FILM COATED ORAL
Qty: 6 TABLET | Refills: 0 | Status: SHIPPED | OUTPATIENT
Start: 2018-11-30 | End: 2019-03-13 | Stop reason: ALTCHOICE

## 2018-11-30 NOTE — TELEPHONE ENCOUNTER
Spoke with patient. She reports she took Benadryl x 4 or 5 days for eye swelling, but it did not help. Patient also developed throat pain about 4 days ago (this was not present at [facility]).  Patient reports a sore throat at night, which wakes her up in the night

## 2018-11-30 NOTE — TELEPHONE ENCOUNTER
Still having problem with eye swelling - medicine is not helping  & throat is now hurting her a lot at night    Requests call back from Dr Fina Lewis nurse     [phone]

## 2018-11-30 NOTE — TELEPHONE ENCOUNTER
Can start zpak and would also recommend using flonase nightly.  Would like to see her again next week if symptoms persist.

## 2019-02-27 RX ORDER — NORTRIPTYLINE HYDROCHLORIDE 75 MG/1
CAPSULE ORAL
Qty: 90 CAPSULE | Refills: 0 | Status: SHIPPED | OUTPATIENT
Start: 2019-02-27 | End: 2019-06-05

## 2019-02-27 NOTE — TELEPHONE ENCOUNTER
Refill request received for Elavil. Last PE 1/24/18. To  please call patient to schedule annual PE and then route back to Rx.

## 2019-03-13 ENCOUNTER — OFFICE VISIT (OUTPATIENT)
Dept: INTERNAL MEDICINE CLINIC | Facility: CLINIC | Age: 69
End: 2019-03-13
Payer: MEDICARE

## 2019-03-13 VITALS
SYSTOLIC BLOOD PRESSURE: 140 MMHG | DIASTOLIC BLOOD PRESSURE: 88 MMHG | OXYGEN SATURATION: 98 % | BODY MASS INDEX: 21.68 KG/M2 | TEMPERATURE: 98 F | HEART RATE: 104 BPM | RESPIRATION RATE: 16 BRPM | HEIGHT: 64 IN | WEIGHT: 127 LBS

## 2019-03-13 DIAGNOSIS — R00.0 TACHYCARDIA: ICD-10-CM

## 2019-03-13 DIAGNOSIS — N39.3 STRESS INCONTINENCE: ICD-10-CM

## 2019-03-13 DIAGNOSIS — Z00.00 ENCOUNTER FOR ANNUAL HEALTH EXAMINATION: Primary | ICD-10-CM

## 2019-03-13 DIAGNOSIS — Z12.39 SCREENING FOR BREAST CANCER: ICD-10-CM

## 2019-03-13 DIAGNOSIS — R53.83 FATIGUE, UNSPECIFIED TYPE: ICD-10-CM

## 2019-03-13 DIAGNOSIS — Z23 NEED FOR VACCINATION: ICD-10-CM

## 2019-03-13 DIAGNOSIS — K44.9 HIATAL HERNIA: ICD-10-CM

## 2019-03-13 DIAGNOSIS — G25.0 ESSENTIAL TREMOR: ICD-10-CM

## 2019-03-13 DIAGNOSIS — M21.619 BUNION: ICD-10-CM

## 2019-03-13 PROCEDURE — G0009 ADMIN PNEUMOCOCCAL VACCINE: HCPCS | Performed by: INTERNAL MEDICINE

## 2019-03-13 PROCEDURE — G0439 PPPS, SUBSEQ VISIT: HCPCS | Performed by: INTERNAL MEDICINE

## 2019-03-13 PROCEDURE — 90732 PPSV23 VACC 2 YRS+ SUBQ/IM: CPT | Performed by: INTERNAL MEDICINE

## 2019-03-13 PROCEDURE — G0463 HOSPITAL OUTPT CLINIC VISIT: HCPCS | Performed by: INTERNAL MEDICINE

## 2019-03-13 RX ORDER — PRIMIDONE 50 MG/1
TABLET ORAL
Qty: 60 TABLET | Refills: 2 | Status: SHIPPED | OUTPATIENT
Start: 2019-03-13 | End: 2019-06-05

## 2019-03-13 NOTE — PATIENT INSTRUCTIONS
Kimberlyn Marcial's SCREENING SCHEDULE   Tests on this list are recommended by your physician but may not be covered, or covered at this frequency, by your insurer. Please check with your insurance carrier before scheduling to verify coverage.    PREVENTATIVE S history    Colorectal Cancer Screening  Covered up to Age 76     Colonoscopy Screen   Covered every 10 years- more often if abnormal Colonoscopy due on 01/18/2027 Update Health Maintenance if applicable    Flex Sigmoidoscopy Screen  Covered every 5 years N Once after 65 Orders placed or performed in visit on 07/01/16   • PNEUMOCOCCAL VACC, 13 PATIENCE IM    Please get once after your 65th birthday    Pneumococcal 23 (Pneumovax)  Covered Once after 65 No orders found for this or any previous visit.  Please get once

## 2019-03-13 NOTE — PROGRESS NOTES
HPI:   Christos Hernandes is a 70year old female who presents for a Medicare Subsequent Annual Wellness visit (Pt already had Initial Annual Wellness). Kesha Trevino is a 70year old female with past medical history of RA, GERD.    She was seeing Dr. Rich Corrigan an sheet section for details. Depression Screening (PHQ-2/PHQ-9): Over the LAST 2 WEEKS         Advanced Directive:   She does NOT have a Living Will on file in [facility].    Not Discussed       She does NOT have a Power of  for [employer] on file in No outpatient medications have been marked as taking for the 3/13/19 encounter (Appointment) with Charley Brittle, DO.  Current Facility-Administered Medications for the 3/13/19 encounter (Appointment) with Charley Brittle, DO:  methylPREDNISolone acetate (DEP General Appearance:  Alert, cooperative, no distress, appears stated age   Head:  Normocephalic, without obvious abnormality, atraumatic   Eyes:  PERRL, conjunctiva/corneas clear, EOM's intact both eyes   Ears:  Normal TM's and external ear canals, bot 2027.   Due for pneumovax today    2. Rheumatoid arthritis  Following with Dr. Rohit Mishra; continue leflunomide 20mg daily.      3. Valvular heart disease  Echo 1/2017 showing moderate MR; echo 3/2018 showing LVEF 24%, grade 1 diastolic dysfunction, mild to Glaucoma Screening      Ophthalmology Visit Annually: Diabetics, FHx Glaucoma, AA>50, > 65 No flowsheet data found.     Bone Density Screening      Dexascan Every two years Last Dexa Scan:   XR DEXA BONE DENSITOMETRY (CPT=77080) 03/12/2018    No

## 2019-03-26 ENCOUNTER — LAB ENCOUNTER (OUTPATIENT)
Dept: LAB | Age: 69
End: 2019-03-26
Attending: INTERNAL MEDICINE
Payer: MEDICARE

## 2019-03-26 DIAGNOSIS — R53.83 FATIGUE, UNSPECIFIED TYPE: ICD-10-CM

## 2019-03-26 DIAGNOSIS — Z00.00 ENCOUNTER FOR ANNUAL HEALTH EXAMINATION: ICD-10-CM

## 2019-03-26 LAB
ALBUMIN SERPL-MCNC: 3.6 G/DL (ref 3.4–5)
ALBUMIN/GLOB SERPL: 0.9 {RATIO} (ref 1–2)
ALP LIVER SERPL-CCNC: 102 U/L (ref 55–142)
ALT SERPL-CCNC: 19 U/L (ref 13–56)
ANION GAP SERPL CALC-SCNC: 8 MMOL/L (ref 0–18)
AST SERPL-CCNC: 16 U/L (ref 15–37)
BASOPHILS # BLD AUTO: 0.05 X10(3) UL (ref 0–0.2)
BASOPHILS NFR BLD AUTO: 0.5 %
BILIRUB SERPL-MCNC: 0.4 MG/DL (ref 0.1–2)
BUN BLD-MCNC: 14 MG/DL (ref 7–18)
BUN/CREAT SERPL: 18.7 (ref 10–20)
CALCIUM BLD-MCNC: 9.3 MG/DL (ref 8.5–10.1)
CHLORIDE SERPL-SCNC: 104 MMOL/L (ref 98–107)
CHOLEST SMN-MCNC: 201 MG/DL (ref ?–200)
CO2 SERPL-SCNC: 26 MMOL/L (ref 21–32)
CREAT BLD-MCNC: 0.75 MG/DL (ref 0.55–1.02)
DEPRECATED RDW RBC AUTO: 42.8 FL (ref 35.1–46.3)
EOSINOPHIL # BLD AUTO: 0.09 X10(3) UL (ref 0–0.7)
EOSINOPHIL NFR BLD AUTO: 1 %
ERYTHROCYTE [DISTWIDTH] IN BLOOD BY AUTOMATED COUNT: 13.2 % (ref 11–15)
GLOBULIN PLAS-MCNC: 3.8 G/DL (ref 2.8–4.4)
GLUCOSE BLD-MCNC: 91 MG/DL (ref 70–99)
HCT VFR BLD AUTO: 39.1 % (ref 35–48)
HDLC SERPL-MCNC: 41 MG/DL (ref 40–59)
HGB BLD-MCNC: 12.6 G/DL (ref 12–16)
IMM GRANULOCYTES # BLD AUTO: 0.05 X10(3) UL (ref 0–1)
IMM GRANULOCYTES NFR BLD: 0.5 %
LDLC SERPL CALC-MCNC: 139 MG/DL (ref ?–100)
LYMPHOCYTES # BLD AUTO: 2.33 X10(3) UL (ref 1–4)
LYMPHOCYTES NFR BLD AUTO: 25.3 %
M PROTEIN MFR SERPL ELPH: 7.4 G/DL (ref 6.4–8.2)
MCH RBC QN AUTO: 29 PG (ref 26–34)
MCHC RBC AUTO-ENTMCNC: 32.2 G/DL (ref 31–37)
MCV RBC AUTO: 89.9 FL (ref 80–100)
MONOCYTES # BLD AUTO: 0.55 X10(3) UL (ref 0.1–1)
MONOCYTES NFR BLD AUTO: 6 %
NEUTROPHILS # BLD AUTO: 6.14 X10 (3) UL (ref 1.5–7.7)
NEUTROPHILS # BLD AUTO: 6.14 X10(3) UL (ref 1.5–7.7)
NEUTROPHILS NFR BLD AUTO: 66.7 %
NONHDLC SERPL-MCNC: 160 MG/DL (ref ?–130)
OSMOLALITY SERPL CALC.SUM OF ELEC: 286 MOSM/KG (ref 275–295)
PLATELET # BLD AUTO: 342 10(3)UL (ref 150–450)
POTASSIUM SERPL-SCNC: 4.4 MMOL/L (ref 3.5–5.1)
RBC # BLD AUTO: 4.35 X10(6)UL (ref 3.8–5.3)
SODIUM SERPL-SCNC: 138 MMOL/L (ref 136–145)
TRIGL SERPL-MCNC: 106 MG/DL (ref 30–149)
TSI SER-ACNC: 1.73 MIU/ML (ref 0.36–3.74)
VLDLC SERPL CALC-MCNC: 21 MG/DL (ref 0–30)
WBC # BLD AUTO: 9.2 X10(3) UL (ref 4–11)

## 2019-03-26 PROCEDURE — 84443 ASSAY THYROID STIM HORMONE: CPT

## 2019-03-26 PROCEDURE — 36415 COLL VENOUS BLD VENIPUNCTURE: CPT

## 2019-03-26 PROCEDURE — 80053 COMPREHEN METABOLIC PANEL: CPT

## 2019-03-26 PROCEDURE — 85025 COMPLETE CBC W/AUTO DIFF WBC: CPT

## 2019-03-26 PROCEDURE — 80061 LIPID PANEL: CPT

## 2019-04-03 NOTE — TELEPHONE ENCOUNTER
To Dr Joanna Hall to please advise on refill request for propranolol----patient was started on primidone at last OV 3/13/19 for tremors. Is she still to continue propranolol as well?

## 2019-04-04 DIAGNOSIS — K44.9 HIATAL HERNIA: ICD-10-CM

## 2019-04-05 RX ORDER — OMEPRAZOLE 20 MG/1
CAPSULE, DELAYED RELEASE ORAL
Qty: 90 CAPSULE | Refills: 3 | Status: SHIPPED | OUTPATIENT
Start: 2019-04-05 | End: 2020-04-30

## 2019-04-05 RX ORDER — PROPRANOLOL HYDROCHLORIDE 20 MG/1
TABLET ORAL
Qty: 270 TABLET | Refills: 0 | Status: SHIPPED | OUTPATIENT
Start: 2019-04-05 | End: 2019-06-05

## 2019-04-15 DIAGNOSIS — J02.9 PHARYNGITIS, UNSPECIFIED ETIOLOGY: ICD-10-CM

## 2019-04-15 DIAGNOSIS — G25.0 ESSENTIAL TREMOR: ICD-10-CM

## 2019-04-16 DIAGNOSIS — G25.0 ESSENTIAL TREMOR: ICD-10-CM

## 2019-04-16 RX ORDER — PRIMIDONE 50 MG/1
TABLET ORAL
Qty: 60 TABLET | Refills: 0 | OUTPATIENT
Start: 2019-04-16

## 2019-04-16 NOTE — TELEPHONE ENCOUNTER
[employer] message sent to patient-did she request refill? It was prescribed in November when patient was sick.

## 2019-04-16 NOTE — TELEPHONE ENCOUNTER
Current refill request refused due to refill is either a duplicate request or has active refills at the pharmacy. Check previous templates.     Requested Prescriptions     Refused Prescriptions Disp Refills   • primidone 50 MG Oral Tab [Pharmacy Med Name:

## 2019-04-19 RX ORDER — FLUTICASONE PROPIONATE 50 MCG
SPRAY, SUSPENSION (ML) NASAL
Refills: 0 | OUTPATIENT
Start: 2019-04-19

## 2019-04-19 NOTE — TELEPHONE ENCOUNTER
No response back from patient via [employer]. LMTCB-did she request refill? Is she still using the nasal spray?

## 2019-04-19 NOTE — TELEPHONE ENCOUNTER
Pt stated that she is having bad allergies and would like a refill of Flonase. Reported nasal congestion; denied any other symptoms. Also requesting results of 3/26/19 lab results.

## 2019-04-22 ENCOUNTER — TELEPHONE (OUTPATIENT)
Dept: INTERNAL MEDICINE CLINIC | Facility: CLINIC | Age: 69
End: 2019-04-22

## 2019-04-22 DIAGNOSIS — G25.0 ESSENTIAL TREMOR: Primary | ICD-10-CM

## 2019-04-22 NOTE — TELEPHONE ENCOUNTER
Pt is calling about the last medication that was prescribed to her, pt stated the medication is causing her not to use the bathroom for a few days after taking.  Pt states she does not have a bowel movement for about 3-4 days, she has tried to eat more fibe

## 2019-04-22 NOTE — TELEPHONE ENCOUNTER
LMTCB    (I think patient is talking about primidone?  Started on 3/13 Take 50mg daily for 3 days, then increase to 100mg daily)

## 2019-04-23 RX ORDER — FLUTICASONE PROPIONATE 50 MCG
2 SPRAY, SUSPENSION (ML) NASAL DAILY
Qty: 1 BOTTLE | Refills: 3 | Status: SHIPPED | OUTPATIENT
Start: 2019-04-23 | End: 2019-06-05

## 2019-04-23 NOTE — TELEPHONE ENCOUNTER
Would try taking metamucil or benefiber nightly to help with the constipation. Does she feel the medication has helped?

## 2019-04-24 NOTE — TELEPHONE ENCOUNTER
I would keep on the primidone as it is helping; we will not increase the dose any further. She can add miralax daily to help with the constipation. Increasing water and fiber will also help.

## 2019-04-24 NOTE — TELEPHONE ENCOUNTER
I spoke with patient and she says primidone is helping her sleep and she is shaking less. She is very happy with this. She has been constipated for 3 days, small BM yesterday. She is already taking benefiber BID and pushing fluids.  She said she would incre

## 2019-04-24 NOTE — TELEPHONE ENCOUNTER
I spoke with patient and relayed Dr. Valerio Alicea message. She verbalized understanding. Invited her to call back if this regimen does not work for her. She verbalized understanding.

## 2019-05-03 NOTE — TELEPHONE ENCOUNTER
Please notify patient that she should taper of this medication as follows:  1) take primidome 50mg daily x 1 week  2) then take primidone 50mg every other day x 1 week,   Then stop.      I would recommend seeing a neurologist at this point to see what else

## 2019-05-03 NOTE — TELEPHONE ENCOUNTER
Spoke to pt and advised on MD message below; pt verbalized understanding. Office number of Dr. Wang Search provided to pt.

## 2019-05-03 NOTE — TELEPHONE ENCOUNTER
Patient calling. Medication Primidone for shaking of head isn't working as well. Family is noticing her head is shaking more, and she noticed it when she looked in the mirror.     Call patient on cell [phone]

## 2019-05-06 ENCOUNTER — TELEPHONE (OUTPATIENT)
Dept: INTERNAL MEDICINE CLINIC | Facility: CLINIC | Age: 69
End: 2019-05-06

## 2019-05-06 NOTE — TELEPHONE ENCOUNTER
Spoke to Neuroscience dept. The first available appt for pt is 6/5/19 at 1030 with Dr. Susan Del Real. Spoke to pt who is agreeable to earlier apt time. Appt scheduled for pt for 6/5/19.

## 2019-05-06 NOTE — TELEPHONE ENCOUNTER
Pt calling to leave a message for a nurse:  Dr Jorge L Mendez sent pt to Dr Jerica Wang for her headaches abd arm shaking, but the doctor cannot see her until 6/28/2019. Pt wondering if there is another doctor she can see sooner?  Pt states she doesn't not want to wait

## 2019-05-06 NOTE — TELEPHONE ENCOUNTER
To Dr. Rocio Graham - see below - attempted to call Dr. Ricks Stamp office but there was a 15 minute wait time.

## 2019-05-06 NOTE — TELEPHONE ENCOUNTER
Please call neurology Dr. Keshawn Grant office-to see first available doctor to evaluation of essential tremor for this patient.  She is having difficulty getting through the phone lines

## 2019-06-05 ENCOUNTER — OFFICE VISIT (OUTPATIENT)
Dept: NEUROLOGY | Facility: CLINIC | Age: 69
End: 2019-06-05
Payer: MEDICARE

## 2019-06-05 VITALS
SYSTOLIC BLOOD PRESSURE: 114 MMHG | HEART RATE: 84 BPM | HEIGHT: 64 IN | BODY MASS INDEX: 21 KG/M2 | WEIGHT: 123 LBS | DIASTOLIC BLOOD PRESSURE: 78 MMHG

## 2019-06-05 DIAGNOSIS — G25.0 ESSENTIAL TREMOR: ICD-10-CM

## 2019-06-05 PROCEDURE — 99204 OFFICE O/P NEW MOD 45 MIN: CPT | Performed by: OTHER

## 2019-06-05 RX ORDER — PRIMIDONE 50 MG/1
TABLET ORAL
Qty: 60 TABLET | Refills: 2 | Status: SHIPPED | OUTPATIENT
Start: 2019-06-05 | End: 2019-09-01

## 2019-06-05 RX ORDER — PROPRANOLOL HYDROCHLORIDE 120 MG/1
120 CAPSULE, EXTENDED RELEASE ORAL DAILY
Qty: 90 CAPSULE | Refills: 1 | Status: SHIPPED | OUTPATIENT
Start: 2019-06-05 | End: 2019-12-10

## 2019-06-05 NOTE — PROGRESS NOTES
Neurology Initial Visit     Referred By: Dr. Borden ref. provider found    Chief Complaint: Patient presents with:  Tremors: Patient presents today c/o tremors in hands and head. She states that her hands started about 7 years ago and head about 3 years ago. Posterior spinal fusion and  instrumentation minimal invasive at L4-L5 with laminectomy, L4-L5 posterolateral fusion.  Dr. Alessandro Guzman   • BREAST SURGERY Bilateral 2013    for cosmetic reason   • HYSTERECTOMY  1991    fibroids   • KNEE ARTHROSCOPY Rig concentration  Thought process intact  Memory intact- recent and remote  Mood intact  Fund of knowledge appropriate for education and age    Language intact including: comprehension, naming, repetition, vocabulary    Cranial Nerves:  II.- Visual fields ful tremor  Patient with appears to most likely essential tremor, especially with her family history and slow progression in symmetric tremors and action tremors.   Therefore we discussed treatments and I suggested to go back on primidone and combined with prop

## 2019-07-09 ENCOUNTER — OFFICE VISIT (OUTPATIENT)
Dept: NEUROLOGY | Facility: CLINIC | Age: 69
End: 2019-07-09
Payer: MEDICARE

## 2019-07-09 VITALS
BODY MASS INDEX: 21 KG/M2 | DIASTOLIC BLOOD PRESSURE: 78 MMHG | HEIGHT: 64 IN | SYSTOLIC BLOOD PRESSURE: 130 MMHG | HEART RATE: 60 BPM | RESPIRATION RATE: 16 BRPM | WEIGHT: 123 LBS

## 2019-07-09 DIAGNOSIS — G25.0 ESSENTIAL TREMOR: Primary | ICD-10-CM

## 2019-07-09 PROCEDURE — 99213 OFFICE O/P EST LOW 20 MIN: CPT | Performed by: OTHER

## 2019-07-09 NOTE — PROGRESS NOTES
Neurology Follow up Visit     Referred By: Dr. Borden ref. provider found    Chief Complaint: Patient presents with:  Seizure Disorder (neurologic): Patient here for follow up for tremor. Since LOV on 6/5/2019 patient reports 65% improvement in tremors.  Prim cord polyp        Past Surgical History:   Procedure Laterality Date   • BACK SURGERY  9/1/11    Posterior spinal fusion and  instrumentation minimal invasive at L4-L5 with laminectomy, L4-L5 posterolateral fusion.  Dr. Michelet Casarez   • Preeti  x3.  Normal attention span and concentration  Thought process intact  Memory intact- recent and remote  Mood intact  Fund of knowledge appropriate for education and age    Language intact including: comprehension, naming, repetition, vocabulary    Cranial side effects of drugs were discussed. Return to clinic in: No follow-ups on file.     Mariela Kong MD

## 2019-08-27 DIAGNOSIS — J02.9 PHARYNGITIS, UNSPECIFIED ETIOLOGY: ICD-10-CM

## 2019-08-27 RX ORDER — FLUTICASONE PROPIONATE 50 MCG
SPRAY, SUSPENSION (ML) NASAL
Qty: 3 BOTTLE | Refills: 1 | Status: SHIPPED | OUTPATIENT
Start: 2019-08-27 | End: 2020-02-25

## 2019-09-01 DIAGNOSIS — G25.0 ESSENTIAL TREMOR: ICD-10-CM

## 2019-09-04 RX ORDER — PRIMIDONE 50 MG/1
TABLET ORAL
Qty: 180 TABLET | Refills: 2 | Status: SHIPPED | OUTPATIENT
Start: 2019-09-04 | End: 2019-10-10

## 2019-09-30 ENCOUNTER — OFFICE VISIT (OUTPATIENT)
Dept: INTERNAL MEDICINE CLINIC | Facility: CLINIC | Age: 69
End: 2019-09-30
Payer: MEDICARE

## 2019-09-30 ENCOUNTER — TELEPHONE (OUTPATIENT)
Dept: INTERNAL MEDICINE CLINIC | Facility: CLINIC | Age: 69
End: 2019-09-30

## 2019-09-30 VITALS
SYSTOLIC BLOOD PRESSURE: 136 MMHG | RESPIRATION RATE: 16 BRPM | DIASTOLIC BLOOD PRESSURE: 74 MMHG | BODY MASS INDEX: 21.51 KG/M2 | HEART RATE: 91 BPM | TEMPERATURE: 99 F | WEIGHT: 126 LBS | HEIGHT: 64 IN | OXYGEN SATURATION: 99 %

## 2019-09-30 DIAGNOSIS — J01.00 ACUTE NON-RECURRENT MAXILLARY SINUSITIS: ICD-10-CM

## 2019-09-30 DIAGNOSIS — J40 BRONCHITIS: Primary | ICD-10-CM

## 2019-09-30 DIAGNOSIS — N39.3 STRESS INCONTINENCE: ICD-10-CM

## 2019-09-30 PROCEDURE — G0463 HOSPITAL OUTPT CLINIC VISIT: HCPCS | Performed by: INTERNAL MEDICINE

## 2019-09-30 PROCEDURE — 99214 OFFICE O/P EST MOD 30 MIN: CPT | Performed by: INTERNAL MEDICINE

## 2019-09-30 RX ORDER — PREDNISONE 10 MG/1
TABLET ORAL
Qty: 30 TABLET | Refills: 0 | Status: SHIPPED | OUTPATIENT
Start: 2019-09-30 | End: 2019-12-26 | Stop reason: ALTCHOICE

## 2019-09-30 RX ORDER — AZITHROMYCIN 250 MG/1
TABLET, FILM COATED ORAL
Qty: 6 TABLET | Refills: 0 | Status: SHIPPED | OUTPATIENT
Start: 2019-09-30 | End: 2019-12-26 | Stop reason: ALTCHOICE

## 2019-09-30 RX ORDER — PROMETHAZINE HYDROCHLORIDE AND CODEINE PHOSPHATE 6.25; 1 MG/5ML; MG/5ML
2.5 SYRUP ORAL EVERY 4 HOURS PRN
Qty: 180 ML | Refills: 0 | Status: SHIPPED
Start: 2019-09-30 | End: 2019-12-26

## 2019-09-30 RX ORDER — ALBUTEROL SULFATE 90 UG/1
2 AEROSOL, METERED RESPIRATORY (INHALATION) EVERY 4 HOURS PRN
Qty: 1 INHALER | Refills: 6 | Status: SHIPPED | OUTPATIENT
Start: 2019-09-30 | End: 2020-06-16

## 2019-09-30 RX ORDER — OXYBUTYNIN CHLORIDE 5 MG/1
5 TABLET, EXTENDED RELEASE ORAL DAILY
Qty: 90 TABLET | Refills: 3 | Status: SHIPPED | OUTPATIENT
Start: 2019-09-30 | End: 2020-06-16

## 2019-09-30 NOTE — TELEPHONE ENCOUNTER
I spoke with patient and she has had a productive cough and sinus congestion for the past 6 days. The last two nights she has had a fever of 100 F. She has been taking dayquil and nyquil.  She declined ER evaluation this morning and prefers to wait for

## 2019-09-30 NOTE — TELEPHONE ENCOUNTER
Pt calling very sick with flu symptoms for 6 days  Has a fever/now has bad cough, congestion, head & ears hurt  Some blood when blowing her nose    Unable to make 12:30 w/Dr Lynn Irving today - scheduled appt for tomorrow morning - requests call back to advise on

## 2019-09-30 NOTE — PROGRESS NOTES
Alma Rosa Monk is a 70year old female. Patient presents with:  Cough: Patient c/o cough, head congestion, and bilateral ear pain for past 6 days. Pain 5/10 to both ears.        HPI:   Alma Rosa Monk is a 70year old female who presents for: cough    Started with KNEE ARTHROSCOPY Left 2004 & 2000    arthroscope x2   • OOPHORECTOMY Bilateral 10/29/01    salpingo-oophorectomy.  Dr. Lazarus Barnes Page   • OTHER SURGICAL HISTORY  6/22/04    Procedure for prolapsed hemorrhoids; both external and internal complicated hemorrhoid mouth every 4 (four) hours as needed for cough. Dispense: 180 mL; Refill: 0  - Albuterol Sulfate HFA (VENTOLIN HFA) 108 (90 Base) MCG/ACT Inhalation Aero Soln; Inhale 2 puffs into the lungs every 4 (four) hours as needed for Wheezing.   Dispense: 1 Inhaler

## 2019-10-04 RX ORDER — DOXYCYCLINE HYCLATE 100 MG/1
100 CAPSULE ORAL 2 TIMES DAILY
Qty: 20 CAPSULE | Refills: 0 | Status: SHIPPED | OUTPATIENT
Start: 2019-10-04 | End: 2019-12-26 | Stop reason: ALTCHOICE

## 2019-10-04 NOTE — TELEPHONE ENCOUNTER
It looks like she is been adequately treated, but we may need to change the course on the antibiotics, extend the course, and complete the rest of the medications Dr. Malcolm Gonzales has given her.   I recommend she goes on doxycycline 100 mg twice daily for another 1

## 2019-10-04 NOTE — TELEPHONE ENCOUNTER
Pt saw Dr. Enedelia Shah on 9/30/19.  Pt states +nasal congestion-bloody nose 3-4x daily, blood tinged on tissue, not enough to where it is dripping out of nose, +productive cough-green sputum, no F/C, no wheezing ,+sometimes has difficulty breathing if she coughs a lo

## 2019-10-04 NOTE — TELEPHONE ENCOUNTER
Requested Prescriptions     Signed Prescriptions Disp Refills   • Doxycycline Hyclate 100 MG Oral Cap 20 capsule 0     Sig: Take 1 capsule (100 mg total) by mouth 2 (two) times daily.      Authorizing Provider: Erum Messina     Ordering User: Naima Maravilla

## 2019-10-07 NOTE — TELEPHONE ENCOUNTER
To Dr. Iona Aase - fyi - see below - f/u with pt today - pt feeling much better - denies fever/cough/runny nose.

## 2019-10-10 ENCOUNTER — TELEPHONE (OUTPATIENT)
Dept: NEUROLOGY | Facility: CLINIC | Age: 69
End: 2019-10-10

## 2019-10-10 DIAGNOSIS — G25.0 ESSENTIAL TREMOR: ICD-10-CM

## 2019-10-10 NOTE — TELEPHONE ENCOUNTER
Per last office note patient advised she could take extra tab of propranolol. Spoke to patient. She states that she is taking propranolol  mg 1 cap daily and primidone 50 mg she increased from BID to TID.  She states that primidone 50 mg TID has h

## 2019-10-11 RX ORDER — PRIMIDONE 50 MG/1
TABLET ORAL
Qty: 270 TABLET | Refills: 2 | Status: SHIPPED | OUTPATIENT
Start: 2019-10-11 | End: 2020-09-28

## 2019-12-10 RX ORDER — PROPRANOLOL HYDROCHLORIDE 120 MG/1
CAPSULE, EXTENDED RELEASE ORAL
Qty: 90 CAPSULE | Refills: 1 | Status: SHIPPED | OUTPATIENT
Start: 2019-12-10 | End: 2020-05-06

## 2019-12-10 NOTE — TELEPHONE ENCOUNTER
Propranolol ER 120mg Take 1 cap daily.     Last filled-6/5/2019 for 6 months    LOV-7/9/2019  NOV-none

## 2019-12-26 ENCOUNTER — TELEPHONE (OUTPATIENT)
Dept: INTERNAL MEDICINE CLINIC | Facility: CLINIC | Age: 69
End: 2019-12-26

## 2019-12-26 ENCOUNTER — OFFICE VISIT (OUTPATIENT)
Dept: INTERNAL MEDICINE CLINIC | Facility: CLINIC | Age: 69
End: 2019-12-26
Payer: MEDICARE

## 2019-12-26 ENCOUNTER — HOSPITAL ENCOUNTER (OUTPATIENT)
Dept: CT IMAGING | Facility: HOSPITAL | Age: 69
Discharge: HOME | End: 2019-12-26
Attending: INTERNAL MEDICINE | Admitting: INTERNAL MEDICINE
Payer: MEDICARE

## 2019-12-26 VITALS
HEART RATE: 67 BPM | BODY MASS INDEX: 21 KG/M2 | DIASTOLIC BLOOD PRESSURE: 70 MMHG | TEMPERATURE: 99 F | WEIGHT: 123.81 LBS | SYSTOLIC BLOOD PRESSURE: 124 MMHG | OXYGEN SATURATION: 98 %

## 2019-12-26 DIAGNOSIS — R63.4 WEIGHT LOSS: ICD-10-CM

## 2019-12-26 DIAGNOSIS — R10.33 PERIUMBILICAL ABDOMINAL PAIN: ICD-10-CM

## 2019-12-26 DIAGNOSIS — R14.0 ABDOMINAL DISTENSION: ICD-10-CM

## 2019-12-26 DIAGNOSIS — R10.33 PERIUMBILICAL ABDOMINAL PAIN: Primary | ICD-10-CM

## 2019-12-26 PROCEDURE — G0463 HOSPITAL OUTPT CLINIC VISIT: HCPCS | Performed by: INTERNAL MEDICINE

## 2019-12-26 PROCEDURE — 74176 CT ABD & PELVIS W/O CONTRAST: CPT | Performed by: INTERNAL MEDICINE

## 2019-12-26 PROCEDURE — 99213 OFFICE O/P EST LOW 20 MIN: CPT | Performed by: INTERNAL MEDICINE

## 2019-12-26 NOTE — PROGRESS NOTES
Hakeem Dougherty is a 78year old female. Patient presents with:  Nausea: Everytime she eats she feels nauseous. Threw up three times yesterday. Denies abdominal pain. Is loosing weight and does not have appetite. Reports normal bms.   Medication Question: Aniya • Allergic dermatitis    • Depression    • Dyschromia    • Hiatal hernia    • History of anxiety     per [employer]:    • Osteoarthritis    • Osteoarthrosis    • Radial styloid tenosynovitis    • Rheumatoid arthritis ([facility] [address]    • Sinus tachycardia 12/14/16 kg/m²     GENERAL: well developed, well nourished, in no apparent distress  NECK: supple, no carotic bruits, no thyromegaly, no cervical or supraclavicular LAD  LUNGS: clear to auscultation bilaterally, no wheezing or rhonchi  CARDIO: RRR, normal S1S2, no

## 2020-02-21 ENCOUNTER — TELEPHONE (OUTPATIENT)
Dept: INTERNAL MEDICINE CLINIC | Facility: CLINIC | Age: 70
End: 2020-02-21

## 2020-02-21 NOTE — TELEPHONE ENCOUNTER
Pt requesting Dr Jessica Galicia recommendation for a new neurologist  Pt has been seeing Dr Neha Moore for about a year and it is not helping her  Please call to discuss/advise  Tasked to nursing

## 2020-02-25 DIAGNOSIS — J02.9 PHARYNGITIS, UNSPECIFIED ETIOLOGY: ICD-10-CM

## 2020-02-25 RX ORDER — FLUTICASONE PROPIONATE 50 MCG
SPRAY, SUSPENSION (ML) NASAL
Qty: 3 BOTTLE | Refills: 1 | Status: SHIPPED | OUTPATIENT
Start: 2020-02-25 | End: 2020-06-16

## 2020-03-27 ENCOUNTER — TELEPHONE (OUTPATIENT)
Dept: INTERNAL MEDICINE CLINIC | Facility: CLINIC | Age: 70
End: 2020-03-27

## 2020-03-27 RX ORDER — AMOXICILLIN AND CLAVULANATE POTASSIUM 875; 125 MG/1; MG/1
1 TABLET, FILM COATED ORAL 2 TIMES DAILY
Qty: 20 TABLET | Refills: 0 | Status: SHIPPED | OUTPATIENT
Start: 2020-03-27 | End: 2020-04-06

## 2020-03-27 NOTE — TELEPHONE ENCOUNTER
I have sent in a prescription for augmentin. She is to take this twice daily for 10 days. I would ask her to additionally monitor for fever, shortness of breath or cough and call the office immediately if this occurs.

## 2020-03-27 NOTE — TELEPHONE ENCOUNTER
Respiratory infection triage:    Fever:  [x]  No fever  []  Fever>100.4    Cough:  [] Tight cough  [] Cough with exertion  [] Dry cough  [] Sputum production, Color: N/A    Breathing:  [] Mild shortness of breath interfering with activity  [] Wheezing  []

## 2020-03-27 NOTE — TELEPHONE ENCOUNTER
Please call pt  Not feeling well  Pt feels pressure under her eyes to forehead, swelling above eyes  This has been for about 10 days  No fever  [facility]  Tasked to nursing

## 2020-04-01 NOTE — TELEPHONE ENCOUNTER
As FYI to AWILDA LANG - called patient who states she is feeling better.  Everything improved , also smell and taste are back

## 2020-04-22 DIAGNOSIS — K44.9 HIATAL HERNIA: ICD-10-CM

## 2020-04-23 NOTE — TELEPHONE ENCOUNTER
Per LOV:  \"will restart omeprazole bid x 2 weeks; if not better will refer to GI (Dr. Eren Mckenna).  If improved, will slowly decrease omeprazole and switch to H2B\"    Mychart to patient

## 2020-04-30 RX ORDER — OMEPRAZOLE 20 MG/1
CAPSULE, DELAYED RELEASE ORAL
Qty: 90 CAPSULE | Refills: 3 | Status: SHIPPED | OUTPATIENT
Start: 2020-04-30 | End: 2021-09-08

## 2020-04-30 NOTE — TELEPHONE ENCOUNTER
To Dr. Marisela Maciel - spoke with pt who stated she is taking omeprazole once daily and it is helping very much. Does not need refill now - will call when she does. What does H2B mean (12/26/19 office note)?

## 2020-05-22 ENCOUNTER — TELEPHONE (OUTPATIENT)
Dept: INTERNAL MEDICINE CLINIC | Facility: CLINIC | Age: 70
End: 2020-05-22

## 2020-05-22 ENCOUNTER — HOSPITAL ENCOUNTER (EMERGENCY)
Facility: HOSPITAL | Age: 70
Discharge: HOME | End: 2020-05-22
Attending: EMERGENCY MEDICINE
Payer: MEDICARE

## 2020-05-22 ENCOUNTER — APPOINTMENT (OUTPATIENT)
Dept: CT IMAGING | Facility: HOSPITAL | Age: 70
End: 2020-05-22
Attending: EMERGENCY MEDICINE
Payer: MEDICARE

## 2020-05-22 VITALS
TEMPERATURE: 98 F | DIASTOLIC BLOOD PRESSURE: 83 MMHG | SYSTOLIC BLOOD PRESSURE: 143 MMHG | WEIGHT: 125 LBS | HEART RATE: 75 BPM | BODY MASS INDEX: 21 KG/M2 | OXYGEN SATURATION: 97 % | RESPIRATION RATE: 18 BRPM

## 2020-05-22 DIAGNOSIS — K57.92 ACUTE DIVERTICULITIS: Primary | ICD-10-CM

## 2020-05-22 PROCEDURE — 80048 BASIC METABOLIC PNL TOTAL CA: CPT | Performed by: EMERGENCY MEDICINE

## 2020-05-22 PROCEDURE — 99284 EMERGENCY DEPT VISIT MOD MDM: CPT

## 2020-05-22 PROCEDURE — 74177 CT ABD & PELVIS W/CONTRAST: CPT | Performed by: EMERGENCY MEDICINE

## 2020-05-22 PROCEDURE — 96374 THER/PROPH/DIAG INJ IV PUSH: CPT

## 2020-05-22 PROCEDURE — 96361 HYDRATE IV INFUSION ADD-ON: CPT

## 2020-05-22 PROCEDURE — 85025 COMPLETE CBC W/AUTO DIFF WBC: CPT | Performed by: EMERGENCY MEDICINE

## 2020-05-22 RX ORDER — CIPROFLOXACIN 500 MG/1
500 TABLET, FILM COATED ORAL 2 TIMES DAILY
Qty: 20 TABLET | Refills: 0 | Status: SHIPPED | OUTPATIENT
Start: 2020-05-22 | End: 2020-06-01

## 2020-05-22 RX ORDER — HYDROCODONE BITARTRATE AND ACETAMINOPHEN 5; 325 MG/1; MG/1
1 TABLET ORAL EVERY 6 HOURS PRN
Qty: 10 TABLET | Refills: 0 | Status: SHIPPED | OUTPATIENT
Start: 2020-05-22 | End: 2021-04-08 | Stop reason: ALTCHOICE

## 2020-05-22 RX ORDER — MORPHINE SULFATE 4 MG/ML
2 INJECTION, SOLUTION INTRAMUSCULAR; INTRAVENOUS ONCE
Status: COMPLETED | OUTPATIENT
Start: 2020-05-22 | End: 2020-05-22

## 2020-05-22 RX ORDER — METRONIDAZOLE 500 MG/1
500 TABLET ORAL 3 TIMES DAILY
Qty: 30 TABLET | Refills: 0 | Status: SHIPPED | OUTPATIENT
Start: 2020-05-22 | End: 2020-06-01

## 2020-05-22 NOTE — TELEPHONE ENCOUNTER
3 days ago pt started to have a lot of stomach pain  Today she sees some blood - dark color/not red - when she had a Bowel Movement    Requests call back from RN     [phone]

## 2020-05-22 NOTE — TELEPHONE ENCOUNTER
Please advise - patient called , states she started with stomach  Pain for 3 days -still very painful . Today she noticed that stool is dark - 1 BM - first BM that she noticed being black - to DR. Casilda Koyanagi

## 2020-05-23 NOTE — ED PROVIDER NOTES
Patient Seen in: [facility] AND [facility] Emergency Department    History   Patient presents with:  GI Bleeding      HPI    Patient presents to the ED complaining of lower abdominal pain for the past 3 days. Had one episode of dark blood in her stool today.   Adrian Barrett Highest education level: Not on file    Tobacco Use      Smoking status: Former Smoker        Years: 6.00        Quit date: 1973        Years since quittin.4      Smokeless tobacco: Never Used    Substance and Sexual Activity      Alcohol use:  Kamari Vivas distension. There is tenderness. There is no rebound and no guarding. Left lower quadrant tenderness to palpation   Musculoskeletal:         General: No deformity or edema. Neurological: She is alert and oriented to person, place, and time.    Skin: Ski (0 mL Intravenous Stopped 5/22/20 2054)   iopamidol (ISOVUE-M) 76 % injection 100 mL (100 mL Intravenous Given 5/22/20 1927)   morphINE sulfate (PF) 4 MG/ML injection 2 mg (2 mg Intravenous Given 5/22/20 2055)         MDM      05/22/20  1636 05/22/20  1800 Pop Ford MD  2 TRANS AM [facility]  [address] [facility] [address] [address] [phone]    Schedule an appointment as soon as possible for a visit in 1 week        Medications Prescribed:  Discharge Medication List as of 5/22/2020  9:08 PM    JENNIFER Cantor

## 2020-05-26 ENCOUNTER — VIRTUAL PHONE E/M (OUTPATIENT)
Dept: INTERNAL MEDICINE CLINIC | Facility: CLINIC | Age: 70
End: 2020-05-26
Payer: MEDICARE

## 2020-05-26 DIAGNOSIS — Z12.31 ENCOUNTER FOR SCREENING MAMMOGRAM FOR MALIGNANT NEOPLASM OF BREAST: ICD-10-CM

## 2020-05-26 DIAGNOSIS — K57.92 DIVERTICULITIS: Primary | ICD-10-CM

## 2020-05-26 DIAGNOSIS — R53.83 FATIGUE, UNSPECIFIED TYPE: ICD-10-CM

## 2020-05-26 DIAGNOSIS — E78.5 DYSLIPIDEMIA: ICD-10-CM

## 2020-05-26 PROCEDURE — 99441 PHONE E/M BY PHYS 5-10 MIN: CPT | Performed by: INTERNAL MEDICINE

## 2020-05-26 NOTE — PROGRESS NOTES
Virtual Telephone Check-In    Baldo Olmos verbally consents to a Virtual/Telephone Check-In visit on 05/26/20. Patient has been referred to the [facility] website at [email or web address] to review the yearly Consent to Treat document.     Patient understands

## 2020-05-27 RX ORDER — PROPRANOLOL HYDROCHLORIDE 120 MG/1
CAPSULE, EXTENDED RELEASE ORAL
Qty: 90 CAPSULE | Refills: 0 | Status: SHIPPED | OUTPATIENT
Start: 2020-05-27 | End: 2020-06-08

## 2020-06-16 PROBLEM — K57.92 DIVERTICULITIS: Status: ACTIVE | Noted: 2020-06-16

## 2020-07-28 PROCEDURE — 88305 TISSUE EXAM BY PATHOLOGIST: CPT | Performed by: INTERNAL MEDICINE

## 2020-09-01 NOTE — TELEPHONE ENCOUNTER
Refill request for primidone 50 mg, take 1 tab for breakfast and 3 tabs at bedtime, #120, 1 refill    LOV: 7/9/19  NOV: 10/21/20

## 2020-09-02 RX ORDER — PRIMIDONE 50 MG/1
TABLET ORAL
Qty: 120 TABLET | Refills: 1 | OUTPATIENT
Start: 2020-09-02

## 2020-09-19 ENCOUNTER — TELEPHONE (OUTPATIENT)
Dept: INTERNAL MEDICINE CLINIC | Facility: CLINIC | Age: 70
End: 2020-09-19

## 2020-09-19 DIAGNOSIS — N39.3 STRESS INCONTINENCE: ICD-10-CM

## 2020-10-01 RX ORDER — OXYBUTYNIN CHLORIDE 5 MG/1
TABLET, EXTENDED RELEASE ORAL
Qty: 90 TABLET | Refills: 0 | Status: SHIPPED | OUTPATIENT
Start: 2020-10-01 | End: 2020-10-06

## 2020-10-01 NOTE — TELEPHONE ENCOUNTER
Noted, med must have been discontinued by clinical staff in error. Refill sent.     Requested Prescriptions     Signed Prescriptions Disp Refills   • OXYBUTYNIN CHLORIDE ER 5 MG Oral Tablet 24 Hr 90 tablet 0     Sig: TAKE 1 TABLET(5 MG) BY MOUTH DAILY     A

## 2020-10-01 NOTE — TELEPHONE ENCOUNTER
Patient called to check status on refill for Oxybutynin  She does take one tablet daily and she did request a refill from [facility]; she is currently out of the medication     Please advise

## 2020-10-06 RX ORDER — OXYBUTYNIN CHLORIDE 5 MG/1
5 TABLET, EXTENDED RELEASE ORAL DAILY
Qty: 90 TABLET | Refills: 0 | Status: SHIPPED | OUTPATIENT
Start: 2020-10-06 | End: 2021-01-07

## 2020-10-06 NOTE — TELEPHONE ENCOUNTER
Pt called to check status on refill  She is out of medication     Per pharmacy - Saul did not receive escribe - Please resend refill

## 2021-01-02 DIAGNOSIS — N39.3 STRESS INCONTINENCE: ICD-10-CM

## 2021-01-03 NOTE — TELEPHONE ENCOUNTER
Patient had last physical 3/13/19 (sick visits after that). Needs appointment for annual PE.  To EMA  to please call patient and assist with scheduling then back to Rx group for refill

## 2021-01-07 RX ORDER — OXYBUTYNIN CHLORIDE 5 MG/1
TABLET, EXTENDED RELEASE ORAL
Qty: 90 TABLET | Refills: 0 | Status: SHIPPED | OUTPATIENT
Start: 2021-01-07 | End: 2021-04-08

## 2021-01-07 NOTE — TELEPHONE ENCOUNTER
Pt scheduled appt with Dr Leena Jurado for 1/11/21  Pt is out of medication  Tasked to [employer]

## 2021-01-07 NOTE — TELEPHONE ENCOUNTER
Noted. Refill sent.     Requested Prescriptions     Signed Prescriptions Disp Refills   • OXYBUTYNIN CHLORIDE ER 5 MG Oral Tablet 24 Hr 90 tablet 0     Sig: TAKE 1 TABLET(5 MG) BY MOUTH DAILY     Authorizing Provider: Bakari Ordonez     Ordering User: Marcela Quick

## 2021-01-08 ENCOUNTER — TELEPHONE (OUTPATIENT)
Dept: INTERNAL MEDICINE CLINIC | Facility: CLINIC | Age: 71
End: 2021-01-08

## 2021-01-08 NOTE — TELEPHONE ENCOUNTER
Please kindly ask patient if we can change physical to medicare annual physical for 1/11/2021 clinic visit

## 2021-01-09 NOTE — H&P
Manuel Harrington is a 69year old female who presents for a Medicare Annual Wellness visit. Feeling good. Some concern with the toes. Has tried some medications for her nails.      PMHx  -Rheumatoid arthritis: Follows with Dr. Maisie Gaucher and on leflunomide 20 walks the dog.     Patient Care Team: Patient Care Team:  Sydnee , DO as PCP - General (Internal Medicine)  Sydnee , DO as PCP - Cindy Sotelo MD ([facility])  Blanca Oliver MD as Gastroenterologist ([facility] [id number])    Patient Act Fall/Risk Scorin          Depression Screening (PHQ-2/PHQ-9): Over the LAST 2 WEEKS      Have you had little interest or pleasure in doing things? NOt at all - 0     Feeling down depressed or hopeless?  Not at all - 0      PHQ-2: 0       Advance Dir Update Health Maintenance if applicable    Chlamydia  Annually if high risk No results found for: CHLAMYDIA No flowsheet data found.     Screening Mammogram      Mammogram  Annually Mammogram due on 03/12/2019 Update Health Maintenance if applicable   Immun Outpatient Medications   Medication Sig Dispense Refill   • Senna-Docusate Sodium 8.6-50 MG Oral Tab Take 1 tablet by mouth daily as needed for constipation.  30 tablet 3   • clonazePAM 0.5 MG Oral Tab Take 1 tablet (0.5 mg total) by mouth 2 (two) times a d 7/28/2020    Performed by Buck Reese MD at [facility]   • KNEE ARTHROSCOPY Right 2004   • KNEE ARTHROSCOPY Left 2004 & 2000    arthroscope x2   • OOPHORECTOMY Bilateral 10/29/01    salpingo-oophorecto (54.4 kg)  06/16/20 : 125 lb (56.7 kg)  06/08/20 : 125 lb (56.7 kg)      > BP Readings from Last 3 Encounters:  01/11/21 : 118/64  07/28/20 : 129/61  06/16/20 : 156/70      GENERAL: well developed, well nourished, in no apparent distress  SKIN: no rashes, fracture risk. 2.          When compared to baseline the left hip has gone from 0.853 to 0.830 a loss of 2.7 %  and the AP lumbar has gone from 0.971 to 1.149 a rise of 18.4 %. Pathology  Final Diagnosis:   A.   Transverse colon polyps:  -Multiple porti normal limits we will do a trial of terbinafine  –Patient would like to see podiatry for routine foot care, assistance with callus of the left fifth digit  -Previously seen by Dr. Angela Conley - referral palced    Constipation  Bowel movement around every medications: Primidone 50 mg daily, clonazepam 0.5 mg 2 times daily    Incontinence:  Home medications: Oxybutynin 5 mg daily    Dyslipidemia  -Repeat lipid panel    Bunion pain:   Saw [address] path–Dr. Metcalf    HTN Screen: At goal  DM Screen: Check

## 2021-01-09 NOTE — PATIENT INSTRUCTIONS
- You were seen in clinic for regular annual check-up. We have ordered labs for you and we will call you with the results. Please obtain fasting and we will call you with the results.  -Please have your mammogram and DEXA scan completed for this year.   We Previous elevated impaired FBS or GTT   … or any two of the following:   • Overweight (BMI ³25 but <30)   • Family history of diabetes   • Age 72 years or older   • History of gestational diabetes or birth of baby weighing more than 9 pounds     Covered at in this risk group, make sure you have a referral   Bone Density Screening      Bone density screening   Covered every 2 yrs after age 72    Covered yearly for Long term Glucocorticoid medication (Steroids) Requires diagnosis related to osteoporosis or est metal- TD and TDaP Not covered by Medicare Part B) No orders found for this or any previous visit.  This may be covered with your prescription benefits, but Medicare does not cover unless Medically needed    Zoster (Not covered by Medicare Part B) No orders

## 2021-01-11 ENCOUNTER — OFFICE VISIT (OUTPATIENT)
Dept: INTERNAL MEDICINE CLINIC | Facility: CLINIC | Age: 71
End: 2021-01-11
Payer: MEDICARE

## 2021-01-11 VITALS
SYSTOLIC BLOOD PRESSURE: 118 MMHG | DIASTOLIC BLOOD PRESSURE: 64 MMHG | BODY MASS INDEX: 22.02 KG/M2 | OXYGEN SATURATION: 97 % | HEIGHT: 64 IN | TEMPERATURE: 98 F | WEIGHT: 129 LBS | HEART RATE: 76 BPM

## 2021-01-11 DIAGNOSIS — M06.9 RHEUMATOID ARTHRITIS INVOLVING MULTIPLE SITES, UNSPECIFIED WHETHER RHEUMATOID FACTOR PRESENT (HCC): ICD-10-CM

## 2021-01-11 DIAGNOSIS — N39.3 STRESS INCONTINENCE: ICD-10-CM

## 2021-01-11 DIAGNOSIS — R12 CHRONIC HEARTBURN: ICD-10-CM

## 2021-01-11 DIAGNOSIS — E78.5 DYSLIPIDEMIA: ICD-10-CM

## 2021-01-11 DIAGNOSIS — Z78.0 POSTMENOPAUSAL: ICD-10-CM

## 2021-01-11 DIAGNOSIS — E55.9 VITAMIN D DEFICIENCY: ICD-10-CM

## 2021-01-11 DIAGNOSIS — R25.1 TREMORS OF NERVOUS SYSTEM: ICD-10-CM

## 2021-01-11 DIAGNOSIS — R00.0 TACHYCARDIA: ICD-10-CM

## 2021-01-11 DIAGNOSIS — Z98.890 HISTORY OF AUGMENTATION OF BOTH BREASTS: ICD-10-CM

## 2021-01-11 DIAGNOSIS — B35.1 ONYCHOMYCOSIS: ICD-10-CM

## 2021-01-11 DIAGNOSIS — Z00.00 ENCOUNTER FOR ANNUAL HEALTH EXAMINATION: Primary | ICD-10-CM

## 2021-01-11 DIAGNOSIS — Z13.220 SCREENING FOR LIPOID DISORDERS: ICD-10-CM

## 2021-01-11 DIAGNOSIS — Z00.00 ENCOUNTER FOR MEDICARE ANNUAL WELLNESS EXAM: ICD-10-CM

## 2021-01-11 DIAGNOSIS — K44.9 HIATAL HERNIA: ICD-10-CM

## 2021-01-11 DIAGNOSIS — Z87.19 HX OF GASTROESOPHAGEAL REFLUX (GERD): ICD-10-CM

## 2021-01-11 DIAGNOSIS — Z12.31 ENCOUNTER FOR SCREENING MAMMOGRAM FOR MALIGNANT NEOPLASM OF BREAST: ICD-10-CM

## 2021-01-11 PROCEDURE — G0439 PPPS, SUBSEQ VISIT: HCPCS | Performed by: INTERNAL MEDICINE

## 2021-01-11 RX ORDER — A/SINGAPORE/GP1908/2015 IVR-180 (AN A/MICHIGAN/45/2015 (H1N1)PDM09-LIKE VIRUS, A/HONG KONG/4801/2014, NYMC X-263B (H3N2) (AN A/HONG KONG/4801/2014-LIKE VIRUS), AND B/BRISBANE/60/2008, WILD TYPE (A B/BRISBANE/60/2008-LIKE VIRUS) 15; 15; 15 UG/.5ML; UG/.5ML; UG/.5ML
0.5 INJECTION, SUSPENSION INTRAMUSCULAR SEE ADMIN INSTRUCTIONS
COMMUNITY
Start: 2020-09-15 | End: 2021-01-11 | Stop reason: ALTCHOICE

## 2021-01-11 RX ORDER — SENNA AND DOCUSATE SODIUM 50; 8.6 MG/1; MG/1
1 TABLET, FILM COATED ORAL DAILY PRN
Qty: 30 TABLET | Refills: 3 | Status: SHIPPED | OUTPATIENT
Start: 2021-01-11 | End: 2021-04-08

## 2021-01-12 ENCOUNTER — LAB ENCOUNTER (OUTPATIENT)
Dept: LAB | Facility: HOSPITAL | Age: 71
End: 2021-01-12
Attending: INTERNAL MEDICINE
Payer: MEDICARE

## 2021-01-12 DIAGNOSIS — Z13.220 SCREENING FOR LIPOID DISORDERS: ICD-10-CM

## 2021-01-12 DIAGNOSIS — Z00.00 ENCOUNTER FOR ANNUAL HEALTH EXAMINATION: ICD-10-CM

## 2021-01-12 DIAGNOSIS — E55.9 VITAMIN D DEFICIENCY: ICD-10-CM

## 2021-01-12 LAB
ALBUMIN SERPL-MCNC: 3.5 G/DL (ref 3.4–5)
ALBUMIN/GLOB SERPL: 1 {RATIO} (ref 1–2)
ALP LIVER SERPL-CCNC: 102 U/L
ALT SERPL-CCNC: 18 U/L
ANION GAP SERPL CALC-SCNC: 2 MMOL/L (ref 0–18)
AST SERPL-CCNC: 18 U/L (ref 15–37)
BASOPHILS # BLD AUTO: 0.06 X10(3) UL (ref 0–0.2)
BASOPHILS NFR BLD AUTO: 0.9 %
BILIRUB SERPL-MCNC: 0.5 MG/DL (ref 0.1–2)
BUN BLD-MCNC: 12 MG/DL (ref 7–18)
BUN/CREAT SERPL: 17.9 (ref 10–20)
CALCIUM BLD-MCNC: 9 MG/DL (ref 8.5–10.1)
CHLORIDE SERPL-SCNC: 101 MMOL/L (ref 98–112)
CHOLEST SMN-MCNC: 215 MG/DL (ref ?–200)
CO2 SERPL-SCNC: 30 MMOL/L (ref 21–32)
CREAT BLD-MCNC: 0.67 MG/DL
DEPRECATED RDW RBC AUTO: 42.3 FL (ref 35.1–46.3)
EOSINOPHIL # BLD AUTO: 0.15 X10(3) UL (ref 0–0.7)
EOSINOPHIL NFR BLD AUTO: 2.1 %
ERYTHROCYTE [DISTWIDTH] IN BLOOD BY AUTOMATED COUNT: 12.4 % (ref 11–15)
GLOBULIN PLAS-MCNC: 3.6 G/DL (ref 2.8–4.4)
GLUCOSE BLD-MCNC: 88 MG/DL (ref 70–99)
HCT VFR BLD AUTO: 41.1 %
HDLC SERPL-MCNC: 52 MG/DL (ref 40–59)
HGB BLD-MCNC: 13.3 G/DL
IMM GRANULOCYTES # BLD AUTO: 0.03 X10(3) UL (ref 0–1)
IMM GRANULOCYTES NFR BLD: 0.4 %
LDLC SERPL CALC-MCNC: 141 MG/DL (ref ?–100)
LYMPHOCYTES # BLD AUTO: 2.09 X10(3) UL (ref 1–4)
LYMPHOCYTES NFR BLD AUTO: 29.7 %
M PROTEIN MFR SERPL ELPH: 7.1 G/DL (ref 6.4–8.2)
MCH RBC QN AUTO: 30 PG (ref 26–34)
MCHC RBC AUTO-ENTMCNC: 32.4 G/DL (ref 31–37)
MCV RBC AUTO: 92.6 FL
MONOCYTES # BLD AUTO: 0.64 X10(3) UL (ref 0.1–1)
MONOCYTES NFR BLD AUTO: 9.1 %
NEUTROPHILS # BLD AUTO: 4.07 X10 (3) UL (ref 1.5–7.7)
NEUTROPHILS # BLD AUTO: 4.07 X10(3) UL (ref 1.5–7.7)
NEUTROPHILS NFR BLD AUTO: 57.8 %
NONHDLC SERPL-MCNC: 163 MG/DL (ref ?–130)
OSMOLALITY SERPL CALC.SUM OF ELEC: 275 MOSM/KG (ref 275–295)
PATIENT FASTING Y/N/NP: YES
PATIENT FASTING Y/N/NP: YES
PLATELET # BLD AUTO: 241 10(3)UL (ref 150–450)
POTASSIUM SERPL-SCNC: 5.2 MMOL/L (ref 3.5–5.1)
RBC # BLD AUTO: 4.44 X10(6)UL
SODIUM SERPL-SCNC: 133 MMOL/L (ref 136–145)
TRIGL SERPL-MCNC: 108 MG/DL (ref 30–149)
TSI SER-ACNC: 3.5 MIU/ML (ref 0.36–3.74)
VLDLC SERPL CALC-MCNC: 22 MG/DL (ref 0–30)
WBC # BLD AUTO: 7 X10(3) UL (ref 4–11)

## 2021-01-12 PROCEDURE — 80053 COMPREHEN METABOLIC PANEL: CPT

## 2021-01-12 PROCEDURE — 82306 VITAMIN D 25 HYDROXY: CPT

## 2021-01-12 PROCEDURE — 80061 LIPID PANEL: CPT

## 2021-01-12 PROCEDURE — 36415 COLL VENOUS BLD VENIPUNCTURE: CPT

## 2021-01-12 PROCEDURE — 84443 ASSAY THYROID STIM HORMONE: CPT

## 2021-01-12 PROCEDURE — 85025 COMPLETE CBC W/AUTO DIFF WBC: CPT

## 2021-01-13 ENCOUNTER — TELEPHONE (OUTPATIENT)
Dept: INTERNAL MEDICINE CLINIC | Facility: CLINIC | Age: 71
End: 2021-01-13

## 2021-01-13 DIAGNOSIS — B35.1 ONYCHOMYCOSIS: Primary | ICD-10-CM

## 2021-01-13 LAB — 25(OH)D3 SERPL-MCNC: 38 NG/ML (ref 30–100)

## 2021-01-13 RX ORDER — ATORVASTATIN CALCIUM 20 MG/1
20 TABLET, FILM COATED ORAL NIGHTLY
Qty: 90 TABLET | Refills: 3 | Status: SHIPPED | OUTPATIENT
Start: 2021-01-13 | End: 2021-10-20

## 2021-01-13 NOTE — TELEPHONE ENCOUNTER
Called patient:     Potassium levels are a little high at 5.2. Recommend that she drink plenty of water, try to minimize potassium intake for the next 7 days (potassium can be found in fruits and potatoes)    Cholesterol is still on the higher side.   She

## 2021-04-08 ENCOUNTER — IMMUNIZATION (OUTPATIENT)
Dept: LAB | Age: 71
End: 2021-04-08
Attending: HOSPITALIST
Payer: MEDICARE

## 2021-04-08 ENCOUNTER — OFFICE VISIT (OUTPATIENT)
Dept: INTERNAL MEDICINE CLINIC | Facility: CLINIC | Age: 71
End: 2021-04-08
Payer: MEDICARE

## 2021-04-08 VITALS
DIASTOLIC BLOOD PRESSURE: 72 MMHG | HEART RATE: 65 BPM | SYSTOLIC BLOOD PRESSURE: 130 MMHG | BODY MASS INDEX: 21.68 KG/M2 | WEIGHT: 127 LBS | TEMPERATURE: 99 F | OXYGEN SATURATION: 97 % | HEIGHT: 64 IN

## 2021-04-08 DIAGNOSIS — Z23 NEED FOR VACCINATION: Primary | ICD-10-CM

## 2021-04-08 DIAGNOSIS — B35.1 ONYCHOMYCOSIS DUE TO DERMATOPHYTE: ICD-10-CM

## 2021-04-08 DIAGNOSIS — M25.462 PAIN AND SWELLING OF LEFT KNEE: Primary | ICD-10-CM

## 2021-04-08 DIAGNOSIS — Z12.31 VISIT FOR SCREENING MAMMOGRAM: ICD-10-CM

## 2021-04-08 DIAGNOSIS — M25.562 PAIN AND SWELLING OF LEFT KNEE: Primary | ICD-10-CM

## 2021-04-08 DIAGNOSIS — N39.3 STRESS INCONTINENCE: ICD-10-CM

## 2021-04-08 PROCEDURE — 0001A SARSCOV2 VAC 30MCG/0.3ML IM: CPT

## 2021-04-08 PROCEDURE — 99214 OFFICE O/P EST MOD 30 MIN: CPT | Performed by: INTERNAL MEDICINE

## 2021-04-08 RX ORDER — OXYBUTYNIN CHLORIDE 5 MG/1
TABLET, EXTENDED RELEASE ORAL
Qty: 90 TABLET | Refills: 1 | Status: SHIPPED | OUTPATIENT
Start: 2021-04-08 | End: 2021-10-12

## 2021-04-08 RX ORDER — POLYETHYLENE GLYCOL 3350 17 G/17G
17 POWDER, FOR SOLUTION ORAL 2 TIMES DAILY
COMMUNITY
End: 2021-10-20

## 2021-04-08 NOTE — PATIENT INSTRUCTIONS
You are seen in clinic after an accidental fall after tripping on a pillow on the floor. The left knee pain and swelling is likely due to local inflammation as result of the fall. It seems to be improving with your home therapies.     Please continue with

## 2021-04-08 NOTE — PROGRESS NOTES
Chief Complaint:   Patient presents with:  Knee Pain: c/o pain to LT knee, 6/10. pain is worse with climbing stairs and prolonged walking/standing. s/p fall 5 days ago. tripped on a pillow that was on the ground in the middle of the night.  she has tried ic HYSTERECTOMY  1991    fibroids   • KNEE ARTHROSCOPY Right 2004   • KNEE ARTHROSCOPY Left 2004 & 2000    arthroscope x2   • OOPHORECTOMY Bilateral 10/29/01    salpingo-oophorectomy.  Dr. Lobito Arreola Page   • OTHER SURGICAL HISTORY  6/22/04    Procedure for prola 150mg nightly x 90 days 360 tablet 3      Counseling given: Not Answered       REVIEW OF SYSTEMS:   Positive Findings indicated in BOLD    Constitutional: Fever, Chills, Weight Gain, Weight Loss, Night Sweats, Fatigue, Malaise  ENT/Mouth:  Hearing Changes, Strength 5/5 BLE  Musculoskeletal: Full range of motion of all extremities,  + Left knee tenderness palpation along the anterior aspect and lateral aspect.   There is a noticeable effusion primarily in the left aspect of the knee  Negative varus/valgus stre escalation of pain management, however she is comfortable with alternating NSAIDs and acetaminophen for pain relief.     Constipation  Bowel movement around every 3 days, no red flag signs or symptoms  -She is taking prune juice and stool softeners.  -Presc daily     Incontinence:  Home medications: Oxybutynin 5 mg daily     Dyslipidemia  -ASCVD score 9.3%, started on atorvastatin 20 mg nightly on 1/13/2021     Bunion pain:   Saw [address] path–Dr. Grey Anton  - Reprinted referral order         Orders This Vi

## 2021-04-12 ENCOUNTER — HOSPITAL ENCOUNTER (OUTPATIENT)
Dept: GENERAL RADIOLOGY | Facility: HOSPITAL | Age: 71
Discharge: HOME | End: 2021-04-12
Attending: INTERNAL MEDICINE
Payer: MEDICARE

## 2021-04-12 ENCOUNTER — TELEPHONE (OUTPATIENT)
Dept: INTERNAL MEDICINE CLINIC | Facility: CLINIC | Age: 71
End: 2021-04-12

## 2021-04-12 DIAGNOSIS — M25.562 PAIN AND SWELLING OF LEFT KNEE: ICD-10-CM

## 2021-04-12 DIAGNOSIS — S82.092A CLOSED SLEEVE FRACTURE OF LEFT PATELLA, INITIAL ENCOUNTER: Primary | ICD-10-CM

## 2021-04-12 DIAGNOSIS — M25.462 PAIN AND SWELLING OF LEFT KNEE: ICD-10-CM

## 2021-04-12 PROCEDURE — 73562 X-RAY EXAM OF KNEE 3: CPT | Performed by: INTERNAL MEDICINE

## 2021-04-12 NOTE — TELEPHONE ENCOUNTER
I reviewed the x-ray from 4/12/2021:    Subacute oblique vertical fracture through the inferior medial margin of the left patella, tricompartment osteoarthritis with subchondral bony changes. Soft tissue swelling superficial patella with mild effusion.

## 2021-04-13 NOTE — TELEPHONE ENCOUNTER
Spoke to pt in depth and advised on MD message below; pt verbalized understanding and wrote down instructions, name of referral MD and office #. Pt reports pain is controlled with AM and PM extra strength Tylenol at this time.  States she will call

## 2021-04-15 NOTE — TELEPHONE ENCOUNTER
Pt called, appt with Dr Rito Jenkins for 5/6/21  Is it ok for to wait for 5/6/21?   Tasked to nursing

## 2021-04-16 NOTE — TELEPHONE ENCOUNTER
Should be ok to wait, let's continue with conservative management for now. She should monitor for worsening swelling of the knee and let us know.     PLease notify the patient

## 2021-04-16 NOTE — TELEPHONE ENCOUNTER
Spoke to pt and relayed MD message and instructions. Pt verbalized understanding and agrees with plan.

## 2021-05-04 ENCOUNTER — IMMUNIZATION (OUTPATIENT)
Dept: LAB | Age: 71
End: 2021-05-04
Attending: HOSPITALIST
Payer: MEDICARE

## 2021-05-04 DIAGNOSIS — Z23 NEED FOR VACCINATION: Primary | ICD-10-CM

## 2021-05-04 PROCEDURE — 0002A SARSCOV2 VAC 30MCG/0.3ML IM: CPT

## 2021-06-10 ENCOUNTER — TELEPHONE (OUTPATIENT)
Dept: INTERNAL MEDICINE CLINIC | Facility: CLINIC | Age: 71
End: 2021-06-10

## 2021-06-10 NOTE — TELEPHONE ENCOUNTER
I spoke with patient and relayed Dr. Hema Yanez message. She verbalized understanding. I provided her with the phone number for the [facility].

## 2021-06-10 NOTE — TELEPHONE ENCOUNTER
To Dr. Gates Fulton - see below  Sx onset: 2 weeks ago. Sx: \"left eye trembling a lo\"t, right eye pain 6/10 - constant dull ache. Pt denies redness. Pt does not have ophthalmologist - can you recommend?   Advised UC/ER for immediate evaluation - but pt reluctant - asking for eye

## 2021-06-10 NOTE — TELEPHONE ENCOUNTER
Pt having an issue with her eyes    Left eye is closed & trembling  Right eye is painful  Who should pt see for this?     Call back# [phone]

## 2021-06-11 NOTE — PROGRESS NOTES
HPI:    Patient ID: Trisha Bruner is a 78year old female. HPI Comments: Wyatt Jones is a 14-year-old woman with chronic rheumatoid arthritis. Since I last saw her March 3rd of 2017, she has remained on leflunomide 20mg daily.  She since has tapered her prednison total) by mouth 3 (three) times daily. Disp: 270 tablet Rfl: 3     Allergies:No Known Allergies   PHYSICAL EXAM:   Physical Exam   Constitutional: She is oriented to person, place, and time. She appears well-developed. HENT:   Head: Normocephalic.    Eyes complains of pain/discomfort

## 2021-09-07 DIAGNOSIS — K44.9 HIATAL HERNIA: ICD-10-CM

## 2021-09-08 RX ORDER — OMEPRAZOLE 20 MG/1
20 CAPSULE, DELAYED RELEASE ORAL
Qty: 90 CAPSULE | Refills: 3 | Status: SHIPPED | OUTPATIENT
Start: 2021-09-08

## 2021-10-11 DIAGNOSIS — N39.3 STRESS INCONTINENCE: ICD-10-CM

## 2021-10-12 DIAGNOSIS — N39.3 STRESS INCONTINENCE: ICD-10-CM

## 2021-10-12 RX ORDER — OXYBUTYNIN CHLORIDE 5 MG/1
TABLET, EXTENDED RELEASE ORAL
Qty: 90 TABLET | Refills: 1 | OUTPATIENT
Start: 2021-10-12

## 2021-10-12 RX ORDER — OXYBUTYNIN CHLORIDE 5 MG/1
TABLET, EXTENDED RELEASE ORAL
Qty: 90 TABLET | Refills: 0 | Status: SHIPPED | OUTPATIENT
Start: 2021-10-12 | End: 2022-01-13

## 2021-10-14 ENCOUNTER — TELEPHONE (OUTPATIENT)
Dept: INTERNAL MEDICINE CLINIC | Facility: CLINIC | Age: 71
End: 2021-10-14

## 2021-10-14 NOTE — TELEPHONE ENCOUNTER
To Dr. Casilda Koyanagi-----    Pt is calling with c/o of constipation for about 1 month now. Pt reports lately she has been struggling to have a BM only every 3-4 days and strains when she has one. Reports her previous baseline was a BM every 1-2 days.  Today was her las

## 2021-10-14 NOTE — TELEPHONE ENCOUNTER
Scheduled pre surgical ov with Dr Alejandro Sears on Oct 20 for foot surgery on Nov 19    Requests call back prior to her appt  for advice re: problem she has been having with constipation, not going regularly sometimes 3 to 4 days in between bowel movements    Ilir

## 2021-10-15 NOTE — TELEPHONE ENCOUNTER
Would recommend miralax daily; if she does not regulate in 2-3 days, I would like her to call us.    Would also make sure she is drinking adequate water

## 2021-10-19 NOTE — H&P
Ana Rosa Car is a 69year old female. HPI:   Patient presents with:  Pre-Op Exam: pre-op exam. Pt unsure of name of surgeon she is seeing at [facility]. Pt having left foot bunion surgery on 11/16/21.      Ms. Shruthi Healy is a 69year old f [address]    fibroids   • KNEE ARTHROSCOPY Right 2004   • KNEE ARTHROSCOPY Left 2004 & 2000    arthroscope x2   • OOPHORECTOMY Bilateral 10/29/01    salpingo-oophorectomy.  Dr. Maribel Miles Page   • OTHER SURGICAL HISTORY  6/22/04    Pro Nasal Congestion, Sinus Pain, Hoarseness, Sore throat, Rhinorrhea, Swallowing Difficulty  Eyes: Eye Pain, Swelling, Redness, Foreign Body, Discharge, Vision Changes  Cardiovascular: Chest Pain, SOB, PND, Dyspnea on Exertion, Orthopnea, Claudication, Edema, gait  Musculoskeletal: Full range of motion of all extremities, +L foot bunion 1st and 5th digit  Skin: No lesions, No erythema, no jaundice, Cap Refill < 2s  Psychiatric: Appropriate mood and affect  Heme/Lymph/Immune: No cervical/axillary/inguinal LAD >=60    GFR, -American 102 >=60    ALT 18 13 - 56 U/L    AST 18 15 - 37 U/L    Alkaline Phosphatase 102 55 - 142 U/L    Bilirubin, Total 0.5 0.1 - 2.0 mg/dL    Total Protein 7.1 6.4 - 8.2 g/dL    Albumin 3.5 3.4 - 5.0 g/dL    Globulin  3.6 2.8 - 4.4 able to perform resistance training, walking for 1.5 hours 5-6 days out of the week without symptoms. By way of history, patient does not have any active chest pain, shortness of breath, lower extremity swelling.   He denies any history of ischemic hea MD Bishop, 10/20/21, 4:26 PM    Addendum:  Recent Results (from the past 8760 hour(s))   CBC W/ DIFFERENTIAL    Collection Time: 10/26/21  9:19 AM   Result Value Ref Range    WBC 5.4 4.0 - 11.0 x10(3) uL    RBC 4.20 3.80 - 5.30 x10(6)uL    HGB 13.0 12.0 - Results for Aakash Bose (MRN [id number]) as of 10/26/2021 11:40   Ref. Range 10/26/2021 09:19   Vitamin D, 25OH, Total Latest Ref Range: 30.0 - 100.0 ng/mL 67.3     Reviewed the blood work from 10/26/2021. BMP, CBC unremarkable.   Vitamin D within nor

## 2021-10-19 NOTE — PATIENT INSTRUCTIONS
You were seen in clinic prior to foot surgery on 11/19/2021.   Based on our assessment, we recommended:    Recommendations:  - Blood tests, we will call you with the results  - Hold Oxybutynin the day of surgery  -Avoid all NSAIDs including ibuprofen, Motri

## 2021-10-20 ENCOUNTER — OFFICE VISIT (OUTPATIENT)
Dept: INTERNAL MEDICINE CLINIC | Facility: CLINIC | Age: 71
End: 2021-10-20
Payer: MEDICARE

## 2021-10-20 VITALS
OXYGEN SATURATION: 99 % | SYSTOLIC BLOOD PRESSURE: 126 MMHG | TEMPERATURE: 98 F | HEIGHT: 64 IN | BODY MASS INDEX: 21.34 KG/M2 | DIASTOLIC BLOOD PRESSURE: 72 MMHG | HEART RATE: 64 BPM | WEIGHT: 125 LBS

## 2021-10-20 DIAGNOSIS — E55.9 VITAMIN D DEFICIENCY: ICD-10-CM

## 2021-10-20 DIAGNOSIS — E78.5 DYSLIPIDEMIA: ICD-10-CM

## 2021-10-20 DIAGNOSIS — R12 CHRONIC HEARTBURN: ICD-10-CM

## 2021-10-20 DIAGNOSIS — Z87.19 HX OF GASTROESOPHAGEAL REFLUX (GERD): ICD-10-CM

## 2021-10-20 DIAGNOSIS — Z01.818 PRE-OP EVALUATION: Primary | ICD-10-CM

## 2021-10-20 DIAGNOSIS — R25.1 TREMORS OF NERVOUS SYSTEM: ICD-10-CM

## 2021-10-20 DIAGNOSIS — Z12.31 ENCOUNTER FOR SCREENING MAMMOGRAM FOR MALIGNANT NEOPLASM OF BREAST: ICD-10-CM

## 2021-10-20 PROCEDURE — 99215 OFFICE O/P EST HI 40 MIN: CPT | Performed by: INTERNAL MEDICINE

## 2021-10-20 PROCEDURE — 90662 IIV NO PRSV INCREASED AG IM: CPT | Performed by: INTERNAL MEDICINE

## 2021-10-20 PROCEDURE — 93000 ELECTROCARDIOGRAM COMPLETE: CPT | Performed by: INTERNAL MEDICINE

## 2021-10-20 PROCEDURE — G0008 ADMIN INFLUENZA VIRUS VAC: HCPCS | Performed by: INTERNAL MEDICINE

## 2021-10-26 ENCOUNTER — LAB ENCOUNTER (OUTPATIENT)
Dept: LAB | Facility: HOSPITAL | Age: 71
End: 2021-10-26
Attending: INTERNAL MEDICINE
Payer: MEDICARE

## 2021-10-26 ENCOUNTER — TELEPHONE (OUTPATIENT)
Dept: INTERNAL MEDICINE CLINIC | Facility: CLINIC | Age: 71
End: 2021-10-26

## 2021-10-26 DIAGNOSIS — E55.9 VITAMIN D DEFICIENCY: ICD-10-CM

## 2021-10-26 DIAGNOSIS — Z01.818 PRE-OP EVALUATION: ICD-10-CM

## 2021-10-26 PROCEDURE — 80048 BASIC METABOLIC PNL TOTAL CA: CPT

## 2021-10-26 PROCEDURE — 36415 COLL VENOUS BLD VENIPUNCTURE: CPT

## 2021-10-26 PROCEDURE — 82306 VITAMIN D 25 HYDROXY: CPT

## 2021-10-26 PROCEDURE — 85025 COMPLETE CBC W/AUTO DIFF WBC: CPT

## 2021-10-26 NOTE — TELEPHONE ENCOUNTER
Please notify the patient that all of her blood work came back looking good. She may proceed with surgery. Please also call [employer] for the fax number with my addended H&P from 10/20/2021, EKG, all blood work from 10/26/2021.

## 2021-10-26 NOTE — TELEPHONE ENCOUNTER
Left message to call back. Consent to release health info on voicemail not checked off.     Pre-op paperwork faxed to [facility] at [phone]

## 2021-10-30 ENCOUNTER — HOSPITAL ENCOUNTER (OUTPATIENT)
Dept: MAMMOGRAPHY | Facility: HOSPITAL | Age: 71
Discharge: HOME | End: 2021-10-30
Attending: INTERNAL MEDICINE
Payer: MEDICARE

## 2021-10-30 DIAGNOSIS — Z12.31 VISIT FOR SCREENING MAMMOGRAM: ICD-10-CM

## 2021-10-30 PROCEDURE — 77063 BREAST TOMOSYNTHESIS BI: CPT | Performed by: INTERNAL MEDICINE

## 2021-10-30 PROCEDURE — 77067 SCR MAMMO BI INCL CAD: CPT | Performed by: INTERNAL MEDICINE

## 2021-11-01 ENCOUNTER — TELEPHONE (OUTPATIENT)
Dept: INTERNAL MEDICINE CLINIC | Facility: CLINIC | Age: 71
End: 2021-11-01

## 2021-11-01 NOTE — TELEPHONE ENCOUNTER
Please notify the patient that the mammogram that she completed was indeterminate. Further imaging and testing is required. Radiology will notify her regarding more in-depth scans that we can follow-up on.

## 2021-11-02 NOTE — TELEPHONE ENCOUNTER
Spoke to patient and relayed MD message, patient verbalized understanding. Patient already scheduled for additional views.

## 2021-11-09 ENCOUNTER — HOSPITAL ENCOUNTER (OUTPATIENT)
Dept: ULTRASOUND IMAGING | Facility: HOSPITAL | Age: 71
Discharge: HOME | End: 2021-11-09
Attending: INTERNAL MEDICINE
Payer: MEDICARE

## 2021-11-09 ENCOUNTER — HOSPITAL ENCOUNTER (OUTPATIENT)
Dept: MAMMOGRAPHY | Facility: HOSPITAL | Age: 71
Discharge: HOME | End: 2021-11-09
Attending: INTERNAL MEDICINE
Payer: MEDICARE

## 2021-11-09 ENCOUNTER — TELEPHONE (OUTPATIENT)
Dept: INTERNAL MEDICINE CLINIC | Facility: CLINIC | Age: 71
End: 2021-11-09

## 2021-11-09 DIAGNOSIS — R92.8 ABNORMAL MAMMOGRAM: ICD-10-CM

## 2021-11-09 PROCEDURE — 77062 BREAST TOMOSYNTHESIS BI: CPT | Performed by: INTERNAL MEDICINE

## 2021-11-09 PROCEDURE — 77066 DX MAMMO INCL CAD BI: CPT | Performed by: INTERNAL MEDICINE

## 2021-11-09 PROCEDURE — 76642 ULTRASOUND BREAST LIMITED: CPT | Performed by: INTERNAL MEDICINE

## 2021-11-09 NOTE — TELEPHONE ENCOUNTER
Please notify the patient reviewed her mammogram:    There is dense tissue without evidence of abnormalities. Likely benign findings. Follow-up diagnostic mammogram is recommended in 6 months.

## 2021-11-14 ENCOUNTER — LAB REQUISITION (OUTPATIENT)
Dept: SURGERY | Age: 71
End: 2021-11-14
Payer: MEDICARE

## 2021-11-14 ENCOUNTER — IMMUNIZATION (OUTPATIENT)
Dept: LAB | Facility: HOSPITAL | Age: 71
End: 2021-11-14
Attending: EMERGENCY MEDICINE
Payer: MEDICARE

## 2021-11-14 DIAGNOSIS — Z23 NEED FOR VACCINATION: Primary | ICD-10-CM

## 2021-11-14 DIAGNOSIS — Z01.818 PREOP EXAMINATION: ICD-10-CM

## 2021-11-14 PROCEDURE — 0004A SARSCOV2 VAC 30MCG/0.3ML IM: CPT

## 2021-11-16 ENCOUNTER — LAB REQUISITION (OUTPATIENT)
Dept: SURGERY | Age: 71
End: 2021-11-16
Payer: MEDICARE

## 2021-11-16 DIAGNOSIS — M20.12 HALLUX VALGUS (ACQUIRED), LEFT FOOT: ICD-10-CM

## 2021-11-16 PROCEDURE — 88305 TISSUE EXAM BY PATHOLOGIST: CPT | Performed by: PODIATRIST

## 2021-12-20 ENCOUNTER — HOSPITAL ENCOUNTER (EMERGENCY)
Facility: HOSPITAL | Age: 71
Discharge: HOME | End: 2021-12-20
Attending: EMERGENCY MEDICINE
Payer: MEDICARE

## 2021-12-20 ENCOUNTER — TELEPHONE (OUTPATIENT)
Dept: INTERNAL MEDICINE CLINIC | Facility: CLINIC | Age: 71
End: 2021-12-20

## 2021-12-20 ENCOUNTER — APPOINTMENT (OUTPATIENT)
Dept: GENERAL RADIOLOGY | Facility: HOSPITAL | Age: 71
End: 2021-12-20
Attending: EMERGENCY MEDICINE
Payer: MEDICARE

## 2021-12-20 VITALS
OXYGEN SATURATION: 97 % | BODY MASS INDEX: 21.34 KG/M2 | WEIGHT: 125 LBS | TEMPERATURE: 98 F | RESPIRATION RATE: 18 BRPM | HEART RATE: 76 BPM | DIASTOLIC BLOOD PRESSURE: 74 MMHG | HEIGHT: 64 IN | SYSTOLIC BLOOD PRESSURE: 136 MMHG

## 2021-12-20 DIAGNOSIS — J06.9 VIRAL URI WITH COUGH: Primary | ICD-10-CM

## 2021-12-20 PROCEDURE — 99283 EMERGENCY DEPT VISIT LOW MDM: CPT

## 2021-12-20 PROCEDURE — 71045 X-RAY EXAM CHEST 1 VIEW: CPT | Performed by: EMERGENCY MEDICINE

## 2021-12-20 RX ORDER — BENZOCAINE AND DEXTROMETHORPHAN HYDROBROMIDE 7.5; 5 MG/1; MG/1
1 LOZENGE ORAL
Qty: 20 LOZENGE | Refills: 0 | Status: SHIPPED | OUTPATIENT
Start: 2021-12-20 | End: 2021-12-27

## 2021-12-20 NOTE — TELEPHONE ENCOUNTER
Patient is calling back. She is concerned about going to the hospital now. She asks if Dr. Conchita Davila can send her in medicine. I did explain that she should have evaluation for her safety. She asks for the doctor's recommendation. To Dr. Conchita Davila.

## 2021-12-20 NOTE — TELEPHONE ENCOUNTER
Patient here by EMS for reported weakness and fall after urinating tonight. Patient reports becoming diaphoretic, falling and hitting his head on the floor. Patient unsure if he had LOC. Patient denies history of weakness or stroke but per EMS family states patient needs assistance ambulating and has a history of stroke. Pt is calling to let Dr Sarah Ruiz know that she will be going to the ER around 3 or 4pm today.

## 2021-12-20 NOTE — TELEPHONE ENCOUNTER
Pt. Called stating about 8 days ago she developed a bad cough, headache, sore throat, congestion. She went for a covid test which was negative.   She went to [facility] for a covid test again yesterday which was negative, and they also did a PCR one which she kevin

## 2021-12-20 NOTE — ED INITIAL ASSESSMENT (HPI)
Patient presents to ER with complaints of flu like symptoms x1 week. Fever, cough, sore throat. Also reports having 2 negative covid tests.

## 2021-12-20 NOTE — TELEPHONE ENCOUNTER
I spoke with patient and relayed Dr. Jonatan Peralta message. She verbalized understanding. Invited patient to call back with any questions or concerns.

## 2021-12-20 NOTE — ED PROVIDER NOTES
Patient Seen in: [facility] AND [facility] Emergency Department      History   No chief complaint on file.     Stated Complaint: Flu-like symptoms    Subjective:   HPI    69-year-old female with past medical history significant for depression, anxiety, arthritis, Years: 6.00        Quit date: 1973        Years since quittin.0      Smokeless tobacco: Never Used    Vaping Use      Vaping Use: Never used    Alcohol use: Yes      Comment: 1-2 drinks occasionally, 3-4 times per year. Drug use:  No symptoms. Will discharge with cough medicine. Patient chest x-ray is negative.                              Disposition and Plan     Clinical Impression:  Viral URI with cough  (primary encounter diagnosis)     Disposition:  Discharge  12/20/2021  7:24 pm

## 2021-12-20 NOTE — TELEPHONE ENCOUNTER
Certainly needs more urgent evaluation either through urgent care or emergency department as previously recommended. Needs clinical evaluation for appropriate diagnosis and management.   I am concerned that she is having dyspnea on exertion even with talki

## 2021-12-20 NOTE — TELEPHONE ENCOUNTER
COVID triage:     Start of symptoms: 8 days ago      Fever:  []  No fever  [x]  Temperature - 2nd day was 101 F  []  Chills   []  Night sweats     Cough:  [x] Productive cough - green   [] Cough with exertion  [] Dry cough     Breathing:  [] Wheezing  [] P

## 2021-12-21 NOTE — TELEPHONE ENCOUNTER
Triage RN f/u---confirmed that patient did go to ER per PCP advisement. Pt instructed by ER physician to f/u with PCP \"in 2 days\".

## 2022-01-11 DIAGNOSIS — N39.3 STRESS INCONTINENCE: ICD-10-CM

## 2022-01-11 NOTE — TELEPHONE ENCOUNTER
Pt is calling and asking for a refill for the following:    OXYBUTYNIN CHLORIDE     Please send to the [facility] in [place] on [address]

## 2022-01-13 RX ORDER — OXYBUTYNIN CHLORIDE 5 MG/1
TABLET, EXTENDED RELEASE ORAL
Qty: 90 TABLET | Refills: 3 | Status: SHIPPED | OUTPATIENT
Start: 2022-01-13

## 2022-03-23 ENCOUNTER — TELEPHONE (OUTPATIENT)
Dept: INTERNAL MEDICINE CLINIC | Facility: CLINIC | Age: 72
End: 2022-03-23

## 2022-03-23 ENCOUNTER — OFFICE VISIT (OUTPATIENT)
Dept: INTERNAL MEDICINE CLINIC | Facility: CLINIC | Age: 72
End: 2022-03-23
Payer: MEDICARE

## 2022-03-23 VITALS
SYSTOLIC BLOOD PRESSURE: 124 MMHG | HEART RATE: 64 BPM | BODY MASS INDEX: 21.31 KG/M2 | WEIGHT: 124.81 LBS | HEIGHT: 64 IN | OXYGEN SATURATION: 94 % | TEMPERATURE: 98 F | DIASTOLIC BLOOD PRESSURE: 68 MMHG

## 2022-03-23 DIAGNOSIS — M06.09 RHEUMATOID ARTHRITIS OF MULTIPLE SITES WITH NEGATIVE RHEUMATOID FACTOR (HCC): ICD-10-CM

## 2022-03-23 DIAGNOSIS — R21 RASH: Primary | ICD-10-CM

## 2022-03-23 DIAGNOSIS — M15.9 PRIMARY OSTEOARTHRITIS INVOLVING MULTIPLE JOINTS: ICD-10-CM

## 2022-03-23 PROCEDURE — 99214 OFFICE O/P EST MOD 30 MIN: CPT | Performed by: INTERNAL MEDICINE

## 2022-03-23 RX ORDER — PRIMIDONE 50 MG/1
50 TABLET ORAL EVERY 6 HOURS SCHEDULED
Qty: 360 TABLET | Refills: 3 | Status: SHIPPED | OUTPATIENT
Start: 2022-03-23 | End: 2022-03-24

## 2022-03-23 RX ORDER — CETIRIZINE HYDROCHLORIDE 5 MG/1
5 TABLET ORAL DAILY
Qty: 90 TABLET | Refills: 1 | Status: SHIPPED | OUTPATIENT
Start: 2022-03-23

## 2022-03-23 RX ORDER — PREDNISONE 10 MG/1
TABLET ORAL
Qty: 30 TABLET | Refills: 0 | Status: SHIPPED | OUTPATIENT
Start: 2022-03-23 | End: 2022-04-04

## 2022-03-23 NOTE — TELEPHONE ENCOUNTER
Spoke to pt who reports x2 itchy rash all over her body, dry skin on face and puffiness in the morning around the eyes along with itching around the eyes. Reports the rash started on bilateral legs and has spread generalized. Reports rash itchy, dry and red. Denies hives/bumps/oozing/bleeding. Denies working outside, hiking/possible poison ivy exposure, denies change in medication, soaps, products, detergents. Denies oral itching/swelling or trouble breathing. Denies covid symptoms, fever, cough, sore throat, ect. Pt schedule for appt today at 1330 with PCP. Pt screened.  Pt advised should she develop any trouble breathing, oral itching/swelling/tongue swelling to report straight to the ER and do NOT await appt; she verbalized understanding

## 2022-03-23 NOTE — TELEPHONE ENCOUNTER
Please call patient, has a itchy/rash that started about two weeks ago, now gone to eyes and neck and face  Can patient be seen in the office?   Tasked to nursing

## 2022-03-23 NOTE — TELEPHONE ENCOUNTER
Opal Miller requesting directions for:  Primidone  RX sent today following patient appointment  Fax placed in green folder  Tasked to nursing

## 2022-03-24 RX ORDER — PRIMIDONE 50 MG/1
100 TABLET ORAL NIGHTLY
Qty: 180 TABLET | Refills: 3 | Status: SHIPPED | OUTPATIENT
Start: 2022-03-24

## 2022-04-08 ENCOUNTER — TELEPHONE (OUTPATIENT)
Dept: INTERNAL MEDICINE CLINIC | Facility: CLINIC | Age: 72
End: 2022-04-08

## 2022-04-08 NOTE — TELEPHONE ENCOUNTER
Medicine she was given for eczema is not helping    Skin is very dry/wrinkled around her eyes  Has to keep applying cream     Requests call back from Dr Darlin Guevara or RN      [phone]

## 2022-04-08 NOTE — TELEPHONE ENCOUNTER
To Dr. Norma Carlson to please advise---    Spoke to pt. Was recently seen in office on 3/23 for generalized rash. She feels that the generalized rash has improved, but has not completely resolved. Reporting redness, dryness, and flaking around her eyes that is itchy and painful. She completed prednisone taper and is still taking cetirizine daily. Has not been using any benadryl cream. She is using an eczema therapy lotion/cream about 4-5 times per day.

## 2022-04-08 NOTE — TELEPHONE ENCOUNTER
Should see dermatology.   Lets see what Dr. Karen Geronimo recommends:    Merlene Thorpe, [address] [facility] [address]  [address] [facility] [phone]

## 2022-09-06 ENCOUNTER — LAB ENCOUNTER (OUTPATIENT)
Dept: LAB | Facility: HOSPITAL | Age: 72
End: 2022-09-06
Attending: INTERNAL MEDICINE
Payer: MEDICARE

## 2022-09-06 ENCOUNTER — OFFICE VISIT (OUTPATIENT)
Dept: INTERNAL MEDICINE CLINIC | Facility: CLINIC | Age: 72
End: 2022-09-06
Payer: MEDICARE

## 2022-09-06 ENCOUNTER — HOSPITAL ENCOUNTER (OUTPATIENT)
Dept: ULTRASOUND IMAGING | Facility: HOSPITAL | Age: 72
Discharge: HOME | End: 2022-09-06
Attending: INTERNAL MEDICINE
Payer: MEDICARE

## 2022-09-06 VITALS
SYSTOLIC BLOOD PRESSURE: 126 MMHG | WEIGHT: 127.5 LBS | OXYGEN SATURATION: 99 % | DIASTOLIC BLOOD PRESSURE: 60 MMHG | HEART RATE: 76 BPM | HEIGHT: 64 IN | BODY MASS INDEX: 21.77 KG/M2

## 2022-09-06 DIAGNOSIS — E55.9 VITAMIN D DEFICIENCY: ICD-10-CM

## 2022-09-06 DIAGNOSIS — E78.5 DYSLIPIDEMIA: ICD-10-CM

## 2022-09-06 DIAGNOSIS — Z12.31 ENCOUNTER FOR SCREENING MAMMOGRAM FOR MALIGNANT NEOPLASM OF BREAST: ICD-10-CM

## 2022-09-06 DIAGNOSIS — R53.83 FATIGUE, UNSPECIFIED TYPE: ICD-10-CM

## 2022-09-06 DIAGNOSIS — M79.661 RIGHT CALF PAIN: ICD-10-CM

## 2022-09-06 DIAGNOSIS — M79.661 RIGHT CALF PAIN: Primary | ICD-10-CM

## 2022-09-06 DIAGNOSIS — Z98.890 HISTORY OF AUGMENTATION OF BOTH BREASTS: ICD-10-CM

## 2022-09-06 DIAGNOSIS — Z78.0 POSTMENOPAUSAL: ICD-10-CM

## 2022-09-06 DIAGNOSIS — R92.2 DENSE BREAST: ICD-10-CM

## 2022-09-06 LAB
ALBUMIN SERPL-MCNC: 3.5 G/DL (ref 3.4–5)
ALBUMIN/GLOB SERPL: 0.9 {RATIO} (ref 1–2)
ALP LIVER SERPL-CCNC: 101 U/L
ALT SERPL-CCNC: 17 U/L
ANION GAP SERPL CALC-SCNC: 4 MMOL/L (ref 0–18)
AST SERPL-CCNC: 18 U/L (ref 15–37)
BASOPHILS # BLD AUTO: 0.04 X10(3) UL (ref 0–0.2)
BASOPHILS NFR BLD AUTO: 0.6 %
BILIRUB SERPL-MCNC: 0.6 MG/DL (ref 0.1–2)
BUN BLD-MCNC: 12 MG/DL (ref 7–18)
BUN/CREAT SERPL: 16.4 (ref 10–20)
CALCIUM BLD-MCNC: 9 MG/DL (ref 8.5–10.1)
CHLORIDE SERPL-SCNC: 102 MMOL/L (ref 98–112)
CHOLEST SERPL-MCNC: 206 MG/DL (ref ?–200)
CO2 SERPL-SCNC: 30 MMOL/L (ref 21–32)
CREAT BLD-MCNC: 0.73 MG/DL
DEPRECATED RDW RBC AUTO: 43.8 FL (ref 35.1–46.3)
EOSINOPHIL # BLD AUTO: 0.2 X10(3) UL (ref 0–0.7)
EOSINOPHIL NFR BLD AUTO: 3 %
ERYTHROCYTE [DISTWIDTH] IN BLOOD BY AUTOMATED COUNT: 12.8 % (ref 11–15)
FASTING PATIENT LIPID ANSWER: YES
FASTING STATUS PATIENT QL REPORTED: YES
GFR SERPLBLD BASED ON 1.73 SQ M-ARVRAT: 87 ML/MIN/1.73M2 (ref 60–?)
GLOBULIN PLAS-MCNC: 3.9 G/DL (ref 2.8–4.4)
GLUCOSE BLD-MCNC: 87 MG/DL (ref 70–99)
HCT VFR BLD AUTO: 41.8 %
HDLC SERPL-MCNC: 52 MG/DL (ref 40–59)
HGB BLD-MCNC: 13.3 G/DL
IMM GRANULOCYTES # BLD AUTO: 0.03 X10(3) UL (ref 0–1)
IMM GRANULOCYTES NFR BLD: 0.5 %
LDLC SERPL CALC-MCNC: 138 MG/DL (ref ?–100)
LYMPHOCYTES # BLD AUTO: 2.08 X10(3) UL (ref 1–4)
LYMPHOCYTES NFR BLD AUTO: 31.3 %
MCH RBC QN AUTO: 29.4 PG (ref 26–34)
MCHC RBC AUTO-ENTMCNC: 31.8 G/DL (ref 31–37)
MCV RBC AUTO: 92.5 FL
MONOCYTES # BLD AUTO: 0.51 X10(3) UL (ref 0.1–1)
MONOCYTES NFR BLD AUTO: 7.7 %
NEUTROPHILS # BLD AUTO: 3.79 X10 (3) UL (ref 1.5–7.7)
NEUTROPHILS # BLD AUTO: 3.79 X10(3) UL (ref 1.5–7.7)
NEUTROPHILS NFR BLD AUTO: 56.9 %
NONHDLC SERPL-MCNC: 154 MG/DL (ref ?–130)
OSMOLALITY SERPL CALC.SUM OF ELEC: 281 MOSM/KG (ref 275–295)
PLATELET # BLD AUTO: 250 10(3)UL (ref 150–450)
POTASSIUM SERPL-SCNC: 4.3 MMOL/L (ref 3.5–5.1)
PROT SERPL-MCNC: 7.4 G/DL (ref 6.4–8.2)
RBC # BLD AUTO: 4.52 X10(6)UL
SODIUM SERPL-SCNC: 136 MMOL/L (ref 136–145)
TRIGL SERPL-MCNC: 91 MG/DL (ref 30–149)
TSI SER-ACNC: 3.13 MIU/ML (ref 0.36–3.74)
VIT D+METAB SERPL-MCNC: 51.3 NG/ML (ref 30–100)
VLDLC SERPL CALC-MCNC: 17 MG/DL (ref 0–30)
WBC # BLD AUTO: 6.7 X10(3) UL (ref 4–11)

## 2022-09-06 PROCEDURE — 99213 OFFICE O/P EST LOW 20 MIN: CPT | Performed by: INTERNAL MEDICINE

## 2022-09-06 PROCEDURE — 80053 COMPREHEN METABOLIC PANEL: CPT

## 2022-09-06 PROCEDURE — 84443 ASSAY THYROID STIM HORMONE: CPT

## 2022-09-06 PROCEDURE — 80061 LIPID PANEL: CPT

## 2022-09-06 PROCEDURE — 36415 COLL VENOUS BLD VENIPUNCTURE: CPT

## 2022-09-06 PROCEDURE — 82306 VITAMIN D 25 HYDROXY: CPT

## 2022-09-06 PROCEDURE — 85025 COMPLETE CBC W/AUTO DIFF WBC: CPT

## 2022-09-06 PROCEDURE — 93971 EXTREMITY STUDY: CPT | Performed by: INTERNAL MEDICINE

## 2022-09-15 DIAGNOSIS — N39.3 STRESS INCONTINENCE: ICD-10-CM

## 2022-09-15 DIAGNOSIS — K44.9 HIATAL HERNIA: ICD-10-CM

## 2022-09-15 NOTE — TELEPHONE ENCOUNTER
[facility] sent over a fax requesting a refill for the following:    Omeprazole DR Cap    Amitriptyline Tab    Oxybutynin Er Tab    Propranolol Er Cap

## 2022-09-18 RX ORDER — OMEPRAZOLE 20 MG/1
20 CAPSULE, DELAYED RELEASE ORAL
Qty: 90 CAPSULE | Refills: 3 | Status: CANCELLED | OUTPATIENT
Start: 2022-09-18

## 2022-09-18 RX ORDER — OXYBUTYNIN CHLORIDE 5 MG/1
TABLET, EXTENDED RELEASE ORAL
Qty: 90 TABLET | Refills: 3 | Status: CANCELLED | OUTPATIENT
Start: 2022-09-18

## 2022-09-18 RX ORDER — PROPRANOLOL HYDROCHLORIDE 40 MG/1
40 TABLET ORAL 2 TIMES DAILY
Qty: 180 TABLET | Refills: 0 | OUTPATIENT
Start: 2022-09-18

## 2022-09-18 NOTE — TELEPHONE ENCOUNTER
Amitriptyline is not on active med list. nortriptyline 75 mg nightly was last refilled 2019 for 3 mo supply and was discontinued 6/5/19.       Oxybutynin has active refills on file with [facility] in [place]    Propranolol is refilled by Neuro/ Dr. Melia Khalil    Pt was advised at last visit to schedule PE in Oct 2022    mychart sent to pt

## 2022-09-23 NOTE — TELEPHONE ENCOUNTER
Patient is calling and would like her prescription sent from [facility] now. Patient would like to talk to someone about her prescription,   Patient is hard to understand.     Please call and advise

## 2022-09-26 ENCOUNTER — OFFICE VISIT (OUTPATIENT)
Dept: INTERNAL MEDICINE CLINIC | Facility: CLINIC | Age: 72
End: 2022-09-26

## 2022-09-26 ENCOUNTER — TELEPHONE (OUTPATIENT)
Dept: INTERNAL MEDICINE CLINIC | Facility: CLINIC | Age: 72
End: 2022-09-26

## 2022-09-26 VITALS
DIASTOLIC BLOOD PRESSURE: 78 MMHG | TEMPERATURE: 98 F | WEIGHT: 126 LBS | BODY MASS INDEX: 21.51 KG/M2 | SYSTOLIC BLOOD PRESSURE: 120 MMHG | HEART RATE: 79 BPM | HEIGHT: 64 IN | OXYGEN SATURATION: 99 %

## 2022-09-26 DIAGNOSIS — N39.3 STRESS INCONTINENCE: ICD-10-CM

## 2022-09-26 DIAGNOSIS — M54.2 NECK PAIN: Primary | ICD-10-CM

## 2022-09-26 DIAGNOSIS — E78.5 DYSLIPIDEMIA: ICD-10-CM

## 2022-09-26 DIAGNOSIS — M54.41 ACUTE RIGHT-SIDED LOW BACK PAIN WITH RIGHT-SIDED SCIATICA: ICD-10-CM

## 2022-09-26 PROCEDURE — 99214 OFFICE O/P EST MOD 30 MIN: CPT | Performed by: INTERNAL MEDICINE

## 2022-09-26 RX ORDER — CYCLOBENZAPRINE HCL 5 MG
5 TABLET ORAL 3 TIMES DAILY PRN
Qty: 60 TABLET | Refills: 1 | Status: SHIPPED | OUTPATIENT
Start: 2022-09-26

## 2022-09-26 RX ORDER — METHYLPREDNISOLONE 4 MG/1
TABLET ORAL
Qty: 1 EACH | Refills: 0 | Status: SHIPPED | OUTPATIENT
Start: 2022-09-26

## 2022-09-26 NOTE — TELEPHONE ENCOUNTER
New onset of RT sided neck pain that started 1 week ago. Reports \"bad\" pain x 3 days, then pain resolved. Pain started up again Friday, 9/23/22. Pain radiates down to RT arm and RT side lower back. Describes as intermittent/sharp. This morning, she took a dose of muscle relaxer. Currently rates pain 6/10 \"a little better\". Pain is triggered with heavy lifting. She denies fall/injury. Denies feeling lightheaded or dizzy. No chest pain, SOB or change in vision. Denies fever/chills or body aches. Office visit advised. appt scheduled for today at 11am.   Patient was advised to discuss refill request with PCP (see previous encounter). She needs to call insurance to confirm that she can use [facility]. Advised to schedule annual exam at the conclusion of today's visit. Patient has not read previous [employer] messages. She states that she does not log into her account. [employer] help desk # provided to deactivate.

## 2022-09-26 NOTE — TELEPHONE ENCOUNTER
Please call patient   She is experiencing neck pain since Friday, cannot move neck and goes down back to right hand  Tasked to nursing

## 2022-09-28 ENCOUNTER — HOSPITAL ENCOUNTER (OUTPATIENT)
Dept: GENERAL RADIOLOGY | Facility: HOSPITAL | Age: 72
Discharge: HOME | End: 2022-09-28
Attending: INTERNAL MEDICINE
Payer: MEDICARE

## 2022-09-28 ENCOUNTER — TELEPHONE (OUTPATIENT)
Dept: INTERNAL MEDICINE CLINIC | Facility: CLINIC | Age: 72
End: 2022-09-28

## 2022-09-28 DIAGNOSIS — M54.41 ACUTE RIGHT-SIDED LOW BACK PAIN WITH RIGHT-SIDED SCIATICA: ICD-10-CM

## 2022-09-28 DIAGNOSIS — M54.2 NECK PAIN: ICD-10-CM

## 2022-09-28 PROCEDURE — 72040 X-RAY EXAM NECK SPINE 2-3 VW: CPT | Performed by: INTERNAL MEDICINE

## 2022-09-28 PROCEDURE — 72110 X-RAY EXAM L-2 SPINE 4/>VWS: CPT | Performed by: INTERNAL MEDICINE

## 2022-09-28 PROCEDURE — 72100 X-RAY EXAM L-S SPINE 2/3 VWS: CPT | Performed by: INTERNAL MEDICINE

## 2022-09-28 NOTE — TELEPHONE ENCOUNTER
Called patient with Cervical X-ray and Lumbar X-ray. LVM with results -advised continue conservative management. Can call back with questions or concerns.

## 2022-11-28 ENCOUNTER — OFFICE VISIT (OUTPATIENT)
Dept: INTERNAL MEDICINE CLINIC | Facility: CLINIC | Age: 72
End: 2022-11-28
Payer: MEDICARE

## 2022-11-28 VITALS
HEART RATE: 71 BPM | TEMPERATURE: 97 F | DIASTOLIC BLOOD PRESSURE: 72 MMHG | BODY MASS INDEX: 21.92 KG/M2 | HEIGHT: 64 IN | OXYGEN SATURATION: 100 % | SYSTOLIC BLOOD PRESSURE: 134 MMHG | WEIGHT: 128.38 LBS

## 2022-11-28 DIAGNOSIS — M54.16 LUMBAR RADICULOPATHY: Primary | ICD-10-CM

## 2022-11-28 PROCEDURE — 99214 OFFICE O/P EST MOD 30 MIN: CPT | Performed by: INTERNAL MEDICINE

## 2022-11-28 RX ORDER — MELOXICAM 15 MG/1
15 TABLET ORAL DAILY
Qty: 30 TABLET | Refills: 0 | Status: SHIPPED | OUTPATIENT
Start: 2022-11-28

## 2022-11-28 RX ORDER — TRAZODONE HYDROCHLORIDE 50 MG/1
1 TABLET ORAL DAILY
COMMUNITY

## 2022-12-08 DIAGNOSIS — K44.9 HIATAL HERNIA: ICD-10-CM

## 2022-12-08 RX ORDER — OMEPRAZOLE 20 MG/1
CAPSULE, DELAYED RELEASE ORAL
Qty: 90 CAPSULE | Refills: 3 | Status: SHIPPED | OUTPATIENT
Start: 2022-12-08

## 2022-12-16 ENCOUNTER — MED REC SCAN ONLY (OUTPATIENT)
Dept: INTERNAL MEDICINE CLINIC | Facility: CLINIC | Age: 72
End: 2022-12-16

## 2023-01-04 ENCOUNTER — TELEPHONE (OUTPATIENT)
Dept: INTERNAL MEDICINE CLINIC | Facility: CLINIC | Age: 73
End: 2023-01-04

## 2023-01-04 NOTE — TELEPHONE ENCOUNTER
Respiratory infection triage:    Fever:  []  No fever  [x]  Fever>100.4 yesterday 100  Cough:  [] Tight cough  [] Cough with exertion  [] Dry cough  [x] Sputum production, Color: green    Breathing:  [x] Mild shortness of breath interfering with activity  [] Wheezing  [x] Pain with deep breathing:throat  [] Using inhaler    Other symptoms:  [x] Sore throat  [x] Difficulty swallowing  [] Nasal drainage/congestion  [x] Sinus congestion/pressure  [] Ear pain  [] Body aches  [x] Poor appetite  [] Loss of sense of smell   [] Loss of sense of taste  []Conjunctivitis? [] Any recent travel? [] Any sick contacts? 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 ADDITIONAL NOTES:  please advise - called patient who SX started 12/31 - took covid test last night which was negative - is taking day and nyquil   To DR. PHILLIPS        Notified patient that we will route this message to the doctor and see what their recommendations would be. In the meantime, if anything worsens, they were advised to call back or seek emergent evaluation.

## 2023-01-04 NOTE — TELEPHONE ENCOUNTER
Patient is calling last week she developed a bad cough, fever yesterday, runny nose  Patient can barely talk due to the cough    Patient took a covid test is was negative. What should patient do?     Please call patient [phone]

## 2023-01-05 RX ORDER — CEFUROXIME AXETIL 500 MG/1
500 TABLET ORAL 2 TIMES DAILY
Qty: 20 TABLET | Refills: 0 | Status: SHIPPED | OUTPATIENT
Start: 2023-01-05

## 2023-01-06 RX ORDER — AMOXICILLIN AND CLAVULANATE POTASSIUM 875; 125 MG/1; MG/1
1 TABLET, FILM COATED ORAL 2 TIMES DAILY
Qty: 20 TABLET | Refills: 0 | Status: SHIPPED | OUTPATIENT
Start: 2023-01-06 | End: 2023-01-16

## 2023-01-06 NOTE — TELEPHONE ENCOUNTER
Please call patient  Medication is too expensive  Can something other be prescribed?   Tasked to nursing

## 2023-01-11 ENCOUNTER — MED REC SCAN ONLY (OUTPATIENT)
Dept: INTERNAL MEDICINE CLINIC | Facility: CLINIC | Age: 73
End: 2023-01-11

## 2023-01-11 DIAGNOSIS — N39.3 STRESS INCONTINENCE: ICD-10-CM

## 2023-01-12 RX ORDER — OXYBUTYNIN CHLORIDE 5 MG/1
TABLET, EXTENDED RELEASE ORAL
Qty: 90 TABLET | Refills: 3 | Status: SHIPPED | OUTPATIENT
Start: 2023-01-12

## 2023-02-01 ENCOUNTER — VIRTUAL PHONE E/M (OUTPATIENT)
Dept: INTERNAL MEDICINE CLINIC | Facility: CLINIC | Age: 73
End: 2023-02-01

## 2023-02-01 DIAGNOSIS — R10.13 DYSPEPSIA: ICD-10-CM

## 2023-02-01 DIAGNOSIS — M54.16 LUMBAR RADICULOPATHY: Primary | ICD-10-CM

## 2023-02-01 PROCEDURE — 99443 PHONE E/M BY PHYS 21-30 MIN: CPT | Performed by: INTERNAL MEDICINE

## 2023-02-01 RX ORDER — NICOTINE POLACRILEX 4 MG/1
1 GUM, CHEWING ORAL 2 TIMES DAILY
Qty: 180 TABLET | Refills: 0 | Status: SHIPPED | OUTPATIENT
Start: 2023-02-01 | End: 2023-03-03

## 2023-02-22 RX ORDER — MELOXICAM 15 MG/1
TABLET ORAL
Qty: 30 TABLET | Refills: 0 | OUTPATIENT
Start: 2023-02-22

## 2023-03-06 ENCOUNTER — TELEPHONE (OUTPATIENT)
Dept: INTERNAL MEDICINE CLINIC | Facility: CLINIC | Age: 73
End: 2023-03-06

## 2023-03-06 DIAGNOSIS — K44.9 HIATAL HERNIA: ICD-10-CM

## 2023-03-06 NOTE — TELEPHONE ENCOUNTER
[facility] [employer] [facility] Omeprazole 20 mg    ID [id number]  Phone [phone]    Placed in purple folder

## 2023-03-07 RX ORDER — OMEPRAZOLE 20 MG/1
20 CAPSULE, DELAYED RELEASE ORAL
Qty: 90 CAPSULE | Refills: 3 | Status: SHIPPED | OUTPATIENT
Start: 2023-03-07

## 2023-03-07 NOTE — TELEPHONE ENCOUNTER
Spoke with pt. Pt states pharmacy does not need prior authorization, just a new order. Order eRx'd to pt's preferred pharmacy. Advised pt to call back if she has any issues getting medication. Pt verbalized understanding.

## 2023-03-27 ENCOUNTER — OFFICE VISIT (OUTPATIENT)
Dept: INTERNAL MEDICINE CLINIC | Facility: CLINIC | Age: 73
End: 2023-03-27

## 2023-03-27 VITALS
OXYGEN SATURATION: 97 % | HEIGHT: 64 IN | SYSTOLIC BLOOD PRESSURE: 130 MMHG | TEMPERATURE: 98 F | DIASTOLIC BLOOD PRESSURE: 72 MMHG | BODY MASS INDEX: 22.02 KG/M2 | HEART RATE: 62 BPM | WEIGHT: 129 LBS

## 2023-03-27 DIAGNOSIS — E78.5 DYSLIPIDEMIA: ICD-10-CM

## 2023-03-27 DIAGNOSIS — E55.9 VITAMIN D DEFICIENCY: ICD-10-CM

## 2023-03-27 DIAGNOSIS — R53.83 FATIGUE, UNSPECIFIED TYPE: ICD-10-CM

## 2023-03-27 DIAGNOSIS — Z00.00 ENCOUNTER FOR ANNUAL HEALTH EXAMINATION: Primary | ICD-10-CM

## 2023-03-27 RX ORDER — AMITRIPTYLINE HYDROCHLORIDE 50 MG/1
50 TABLET, FILM COATED ORAL NIGHTLY
COMMUNITY
Start: 2023-03-24

## 2023-05-12 ENCOUNTER — APPOINTMENT (OUTPATIENT)
Dept: GENERAL RADIOLOGY | Age: 73
End: 2023-05-12
Attending: NURSE PRACTITIONER
Payer: MEDICARE

## 2023-05-12 ENCOUNTER — HOSPITAL ENCOUNTER (OUTPATIENT)
Age: 73
Discharge: HOME | End: 2023-05-12
Payer: MEDICARE

## 2023-05-12 ENCOUNTER — TELEPHONE (OUTPATIENT)
Dept: INTERNAL MEDICINE CLINIC | Facility: CLINIC | Age: 73
End: 2023-05-12

## 2023-05-12 VITALS
SYSTOLIC BLOOD PRESSURE: 132 MMHG | DIASTOLIC BLOOD PRESSURE: 71 MMHG | TEMPERATURE: 98 F | OXYGEN SATURATION: 100 % | HEART RATE: 73 BPM | RESPIRATION RATE: 16 BRPM

## 2023-05-12 DIAGNOSIS — M25.462 KNEE EFFUSION, LEFT: ICD-10-CM

## 2023-05-12 DIAGNOSIS — S89.92XA INJURY OF LEFT KNEE, INITIAL ENCOUNTER: ICD-10-CM
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
## 2023-05-12 PROCEDURE — 73560 X-RAY EXAM OF KNEE 1 OR 2: CPT | Performed by: NURSE PRACTITIONER

## 2023-05-12 PROCEDURE — 99203 OFFICE O/P NEW LOW 30 MIN: CPT | Performed by: NURSE PRACTITIONER

## 2023-05-12 NOTE — DISCHARGE INSTRUCTIONS
Wear the Ace wrap to the knee throughout the day it may be removed at bedtime elevate the extremity when at rest ice pack to the knee 2-3 times per day inside of a pillowcase or cloth. Continue to take Tylenol for pain. Follow-up with your primary care provider 2 to 3 days for reevaluation or for pain management. Recommend following up with orthopedic specialist if you continue to have knee pain. If you develop pain in location you did not initially have numbness or tingling in the extremities the toes appear blue and not pink wake up and cannot walk or feel the extremity call 911 and go to the nearest emergency department.

## 2023-05-12 NOTE — TELEPHONE ENCOUNTER
Spoke with pt, reports on  5/10 she tripped and fell in the street while crossing a road. Pt landed on her left elbow and knee. Minor scrapes to left elbow, skin intact on left knee. States she was unable to get up on her own, she was assisted by someone in a car passing by. Pt did not hit her head, no LOC. Tolerated ice and rest to areas. Denies pain to left elbow. Reports pain on left knee has worsened. Area is swollen and pain is 8/10. Denies bruising, erythemia, numbness, tingling. Pain worsens while ambulating and climbing stairs. She didn't sleep well d/t pain. Taking Tylenol ES BID for pain. Denies being on blood thinners. Pt advised to be seen in UC for assessment. Pt agreeable to visit [facility]. Nursing to F/U.

## 2023-05-12 NOTE — TELEPHONE ENCOUNTER
Patient is calling and states she fell on Wednesday the 10th. Patient fell on her left knee. Patient states her knee hurts and is swollen. Patient states she has been icing her knee but it is not helping and it is getting worse. Patient states she could not sleep do the pain. Patient is having a hard time walking.     Please call and advise

## 2023-05-15 NOTE — TELEPHONE ENCOUNTER
Please ask pt how she is doing; would recommend she schedule with the orthopedic if symptoms are worsening or not improving. (was provided info for dr Carvalho Section)

## 2023-05-15 NOTE — TELEPHONE ENCOUNTER
Spoke with patient states she is feeling better today. Patient has been icing her knee and it has helped. Patient was out walking her dog today. Patient states she still has the information for Dr. Celestina Falcon if she needs it.

## 2023-05-26 NOTE — TELEPHONE ENCOUNTER
Pt calling to reschedule CLN on 1/18 [0] : 0 [Burning] : burning [Intermittent] : intermittent [Retired] : Work status: retired [1] : 1 [Euflexxa] : Euflexxa [de-identified] : Patient is here for a follow up on her left knee. Euflexxa #1 [] : This patient has had an injection before: no [FreeTextEntry1] : Let knee [FreeTextEntry9] : ibuprofen [de-identified] : bending [de-identified] : Home exercises, spinning

## 2023-06-20 ENCOUNTER — TELEPHONE (OUTPATIENT)
Dept: INTERNAL MEDICINE CLINIC | Facility: CLINIC | Age: 73
End: 2023-06-20

## 2023-06-20 ENCOUNTER — HOSPITAL ENCOUNTER (OUTPATIENT)
Dept: MAMMOGRAPHY | Facility: HOSPITAL | Age: 73
Discharge: HOME | End: 2023-06-20
Attending: INTERNAL MEDICINE
Payer: MEDICARE

## 2023-06-20 DIAGNOSIS — Z98.890 HISTORY OF AUGMENTATION OF BOTH BREASTS: ICD-10-CM

## 2023-06-20 DIAGNOSIS — R92.8 ABNORMAL MAMMOGRAM: Primary | ICD-10-CM

## 2023-06-20 DIAGNOSIS — Z12.31 ENCOUNTER FOR SCREENING MAMMOGRAM FOR MALIGNANT NEOPLASM OF BREAST: ICD-10-CM

## 2023-06-20 DIAGNOSIS — R92.2 DENSE BREAST: ICD-10-CM

## 2023-06-20 NOTE — TELEPHONE ENCOUNTER
Mammogram Department is calling to request an order for a \"Diagnostic Bilateral with Ultrasound as needed. \" The current order is not correct. Patient is at the mammogram department now, but will be rescheduling to another day. No call back needed.    ([facility])

## 2023-06-21 NOTE — TELEPHONE ENCOUNTER
Mammogram Department is calling to request the Diagnostic Mammogram be corrected  Patient no longer has breast implants  Please update order

## 2023-06-30 ENCOUNTER — HOSPITAL ENCOUNTER (OUTPATIENT)
Dept: MAMMOGRAPHY | Facility: HOSPITAL | Age: 73
Discharge: HOME | End: 2023-06-30
Attending: INTERNAL MEDICINE
Payer: MEDICARE

## 2023-06-30 DIAGNOSIS — R92.8 ABNORMAL MAMMOGRAM: ICD-10-CM

## 2023-06-30 PROCEDURE — 77066 DX MAMMO INCL CAD BI: CPT | Performed by: INTERNAL MEDICINE

## 2023-06-30 PROCEDURE — 77062 BREAST TOMOSYNTHESIS BI: CPT | Performed by: INTERNAL MEDICINE

## 2023-07-03 ENCOUNTER — TELEPHONE (OUTPATIENT)
Dept: INTERNAL MEDICINE CLINIC | Facility: CLINIC | Age: 73
End: 2023-07-03

## 2023-07-03 DIAGNOSIS — R92.8 ABNORMAL MAMMOGRAM: Primary | ICD-10-CM

## 2023-07-05 ENCOUNTER — OFFICE VISIT (OUTPATIENT)
Dept: INTERNAL MEDICINE CLINIC | Facility: CLINIC | Age: 73
End: 2023-07-05

## 2023-07-05 VITALS
WEIGHT: 130 LBS | DIASTOLIC BLOOD PRESSURE: 70 MMHG | OXYGEN SATURATION: 98 % | HEART RATE: 71 BPM | HEIGHT: 64 IN | SYSTOLIC BLOOD PRESSURE: 120 MMHG | TEMPERATURE: 99 F | BODY MASS INDEX: 22.2 KG/M2

## 2023-07-05 DIAGNOSIS — M17.0 PRIMARY OSTEOARTHRITIS OF BOTH KNEES: Primary | ICD-10-CM

## 2023-07-05 PROCEDURE — 1125F AMNT PAIN NOTED PAIN PRSNT: CPT | Performed by: INTERNAL MEDICINE

## 2023-07-05 PROCEDURE — 3008F BODY MASS INDEX DOCD: CPT | Performed by: INTERNAL MEDICINE

## 2023-07-05 PROCEDURE — 3078F DIAST BP <80 MM HG: CPT | Performed by: INTERNAL MEDICINE

## 2023-07-05 PROCEDURE — 3074F SYST BP LT 130 MM HG: CPT | Performed by: INTERNAL MEDICINE

## 2023-07-05 PROCEDURE — 1159F MED LIST DOCD IN RCRD: CPT | Performed by: INTERNAL MEDICINE

## 2023-07-05 RX ORDER — PROPRANOLOL HYDROCHLORIDE 160 MG/1
1 CAPSULE, EXTENDED RELEASE ORAL EVERY MORNING
COMMUNITY
Start: 2023-06-18

## 2023-07-05 RX ORDER — GABAPENTIN 300 MG/1
300 CAPSULE ORAL DAILY
COMMUNITY
Start: 2023-06-06

## 2023-09-28 NOTE — TELEPHONE ENCOUNTER
Patient called  Requesting refill for:  Propranolol 160MG  Previous physician who prescribed no longer there, appt with new Dr not until next month  Patient hoping Dr Chelsey Saucedo can fill for her until she sees the new physician  Patient is out of medication  [facility]  Please call patient to advise  Tasked to [employer]

## 2023-09-29 NOTE — TELEPHONE ENCOUNTER
Patient is calling checking on the status of her refill. Patient states she is completely out of the medication.     Please send to [facility] in [place]

## 2023-10-09 RX ORDER — PROPRANOLOL HYDROCHLORIDE 160 MG/1
1 CAPSULE, EXTENDED RELEASE ORAL EVERY MORNING
Qty: 30 CAPSULE | Refills: 0 | Status: SHIPPED | OUTPATIENT
Start: 2023-10-09

## 2023-10-11 RX ORDER — PROPRANOLOL HYDROCHLORIDE 160 MG/1
1 CAPSULE, EXTENDED RELEASE ORAL EVERY MORNING
Qty: 90 CAPSULE | Refills: 0 | OUTPATIENT
Start: 2023-10-11

## 2023-10-11 NOTE — TELEPHONE ENCOUNTER
Current refill request refused due to refill is either a duplicate request or has active refills at the pharmacy. Check previous templates.     Requested Prescriptions     Refused Prescriptions Disp Refills    PROPRANOLOL HCL  MG Oral Capsule SR 24 Hr [Pharmacy Med Name: PROPRANOLOL ER 160MG CAPSULES] 90 capsule 0     Sig: TAKE 1 CAPSULE(160 MG) BY MOUTH EVERY MORNING     Refused By: Ijeoma Raman     Reason for Refusal: Request already responded to by other means (e.g. phone or fax)

## 2023-11-01 ENCOUNTER — OFFICE VISIT (OUTPATIENT)
Dept: FAMILY MEDICINE CLINIC | Facility: CLINIC | Age: 73
End: 2023-11-01

## 2023-11-01 ENCOUNTER — HOSPITAL ENCOUNTER (OUTPATIENT)
Dept: GENERAL RADIOLOGY | Facility: HOSPITAL | Age: 73
Discharge: HOME | End: 2023-11-01
Payer: MEDICARE

## 2023-11-01 VITALS
HEIGHT: 60.7 IN | HEART RATE: 69 BPM | TEMPERATURE: 99 F | WEIGHT: 128.19 LBS | BODY MASS INDEX: 24.52 KG/M2 | DIASTOLIC BLOOD PRESSURE: 71 MMHG | OXYGEN SATURATION: 100 % | SYSTOLIC BLOOD PRESSURE: 139 MMHG

## 2023-11-01 DIAGNOSIS — R63.0 DECREASED APPETITE: ICD-10-CM

## 2023-11-01 DIAGNOSIS — H65.192 OTHER NON-RECURRENT ACUTE NONSUPPURATIVE OTITIS MEDIA OF LEFT EAR: ICD-10-CM

## 2023-11-01 DIAGNOSIS — Z20.822 SUSPECTED COVID-19 VIRUS INFECTION: Primary | ICD-10-CM

## 2023-11-01 DIAGNOSIS — R05.1 ACUTE COUGH: ICD-10-CM

## 2023-11-01 LAB
COVID19 BINAX NOW ANTIGEN: NOT DETECTED
OPERATOR ID: NORMAL

## 2023-11-01 PROCEDURE — 1159F MED LIST DOCD IN RCRD: CPT

## 2023-11-01 PROCEDURE — 71046 X-RAY EXAM CHEST 2 VIEWS: CPT

## 2023-11-01 PROCEDURE — 3008F BODY MASS INDEX DOCD: CPT

## 2023-11-01 PROCEDURE — 1160F RVW MEDS BY RX/DR IN RCRD: CPT

## 2023-11-01 PROCEDURE — 99213 OFFICE O/P EST LOW 20 MIN: CPT

## 2023-11-01 PROCEDURE — 3078F DIAST BP <80 MM HG: CPT

## 2023-11-01 PROCEDURE — 3075F SYST BP GE 130 - 139MM HG: CPT

## 2023-11-01 RX ORDER — AMOXICILLIN AND CLAVULANATE POTASSIUM 875; 125 MG/1; MG/1
1 TABLET, FILM COATED ORAL 2 TIMES DAILY
Qty: 14 TABLET | Refills: 0 | Status: SHIPPED | OUTPATIENT
Start: 2023-11-01 | End: 2023-11-08

## 2023-11-01 RX ORDER — TOPIRAMATE 25 MG/1
TABLET ORAL
COMMUNITY
Start: 2023-05-29

## 2023-11-01 RX ORDER — ALBUTEROL SULFATE 90 UG/1
2 AEROSOL, METERED RESPIRATORY (INHALATION) EVERY 4 HOURS PRN
Qty: 18 G | Refills: 0 | Status: SHIPPED | OUTPATIENT
Start: 2023-11-01

## 2023-12-01 RX ORDER — PROPRANOLOL HYDROCHLORIDE 160 MG/1
1 CAPSULE, EXTENDED RELEASE ORAL EVERY MORNING
Qty: 90 CAPSULE | Refills: 0 | OUTPATIENT
Start: 2023-12-01

## 2023-12-05 ENCOUNTER — OFFICE VISIT (OUTPATIENT)
Dept: FAMILY MEDICINE CLINIC | Facility: CLINIC | Age: 73
End: 2023-12-05

## 2023-12-05 VITALS
WEIGHT: 128 LBS | OXYGEN SATURATION: 98 % | DIASTOLIC BLOOD PRESSURE: 82 MMHG | RESPIRATION RATE: 18 BRPM | BODY MASS INDEX: 24.48 KG/M2 | HEIGHT: 60.7 IN | SYSTOLIC BLOOD PRESSURE: 137 MMHG | HEART RATE: 78 BPM

## 2023-12-05 DIAGNOSIS — E28.39 ESTROGEN DEFICIENCY: ICD-10-CM

## 2023-12-05 DIAGNOSIS — M06.09 RHEUMATOID ARTHRITIS OF MULTIPLE SITES WITH NEGATIVE RHEUMATOID FACTOR (HCC): ICD-10-CM

## 2023-12-05 DIAGNOSIS — N39.3 STRESS INCONTINENCE: ICD-10-CM

## 2023-12-05 DIAGNOSIS — G25.0 ESSENTIAL TREMOR: Primary | ICD-10-CM

## 2023-12-05 PROCEDURE — 1159F MED LIST DOCD IN RCRD: CPT | Performed by: FAMILY MEDICINE

## 2023-12-05 PROCEDURE — 3079F DIAST BP 80-89 MM HG: CPT | Performed by: FAMILY MEDICINE

## 2023-12-05 PROCEDURE — 3075F SYST BP GE 130 - 139MM HG: CPT | Performed by: FAMILY MEDICINE

## 2023-12-05 PROCEDURE — 1160F RVW MEDS BY RX/DR IN RCRD: CPT | Performed by: FAMILY MEDICINE

## 2023-12-05 PROCEDURE — 3008F BODY MASS INDEX DOCD: CPT | Performed by: FAMILY MEDICINE

## 2023-12-05 PROCEDURE — 99214 OFFICE O/P EST MOD 30 MIN: CPT | Performed by: FAMILY MEDICINE

## 2023-12-05 RX ORDER — PROPRANOLOL HYDROCHLORIDE 160 MG/1
1 CAPSULE, EXTENDED RELEASE ORAL EVERY MORNING
Qty: 30 CAPSULE | Refills: 0 | Status: SHIPPED | OUTPATIENT
Start: 2023-12-05 | End: 2023-12-06

## 2023-12-05 RX ORDER — OXYBUTYNIN CHLORIDE 5 MG/1
TABLET, EXTENDED RELEASE ORAL
Qty: 90 TABLET | Refills: 3 | Status: SHIPPED | OUTPATIENT
Start: 2023-12-05

## 2023-12-05 NOTE — H&P
HPI:    Jennifer Yao is a 75year old female presents clinic as a new patient to establish care. History of tremors. Previously taking propanolol with some improvement. Trying to establish with a neurologist, previous neurologist moved. Has an appointment next month at [facility], open to suggestions as well. History of stress incontinence. Symptoms improved with oxybutynin. Requesting refill. History of rheumatoid arthritis. Was previously seeing a rheumatologist and taking a biologic agent. Patient stopped when she read the side effects. Takes NSAIDs as needed for acute pain, is comfortable managing condition this way. HISTORY:  Past Medical History:   Diagnosis Date    Allergic dermatitis     Depression     Dyschromia     Hiatal hernia     History of anxiety     per [employer]:     Osteoarthritis     Osteoarthrosis     Radial styloid tenosynovitis     Rheumatoid arthritis ([address]     Sinus tachycardia 12/14/16    neg stress, EKG normal    Spondylolisthesis     L4-L5    Tremors of nervous system     Vocal cord polyp       Past Surgical History:   Procedure Laterality Date    BACK SURGERY  9/1/11    Posterior spinal fusion and  instrumentation minimal invasive at L4-L5 with laminectomy, L4-L5 posterolateral fusion. Dr. Hieu Wright Bilateral 2013    for cosmetic reason    COLONOSCOPY  07/2020    COLONOSCOPY N/A 7/28/2020    Procedure: COLONOSCOPY, POSSIBLE BIOPSY, POSSIBLE POLYPECTOMY 08802;  Surgeon: Andrew Hinojosa MD;  Location: [address] [place] [address]    fibroids    KNEE ARTHROSCOPY Right 2004    KNEE ARTHROSCOPY Left 2004 & 2000    arthroscope x2    OOPHORECTOMY Bilateral 10/29/01    salpingo-oophorectomy. Dr. Sherlyn Galeas    OTHER SURGICAL HISTORY  6/22/04    Procedure for prolapsed hemorrhoids; both external and internal complicated hemorrhoids.  Dr. Gene Gould      Family History   Problem Relation Age of Onset    Cancer Father         abdominal Arthritis Mother     Cancer Mother         ovarian    Ovarian Cancer Mother         39    Cancer Paternal Uncle         brain    Ovarian Cancer Sister         25      Social History:   Social History     Socioeconomic History    Marital status:    Tobacco Use    Smoking status: Former     Years: 6     Types: Cigarettes     Quit date: 1973     Years since quittin.9    Smokeless tobacco: Never   Vaping Use    Vaping Use: Never used   Substance and Sexual Activity    Alcohol use: Yes     Comment: 1-2 drinks occasionally, 3-4 times per year. Drug use: No   Other Topics Concern    Caffeine Concern Yes     Comment: 1-2 large cups coffee daily        Medications (Active prior to today's visit):  Current Outpatient Medications   Medication Sig Dispense Refill    Propranolol HCl  MG Oral Capsule SR 24 Hr Take 1 capsule (160 mg total) by mouth every morning. 30 capsule 0    oxybutynin ER 5 MG Oral Tablet 24 Hr TAKE 1 TABLET(5 MG) BY MOUTH DAILY 90 tablet 3    topiramate 25 MG Oral Tab       albuterol 108 (90 Base) MCG/ACT Inhalation Aero Soln Inhale 2 puffs into the lungs every 4 (four) hours as needed. 18 g 0    Spacer/Aero-Holding Chambers Does not apply Device Use inhaler with Albuterol as needed 1 each 0    Multiple Vitamins-Minerals (MULTIVITAMIN ADULTS) Oral Tab Take 1 tablet by mouth daily. amitriptyline 50 MG Oral Tab Take 1 tablet (50 mg total) by mouth nightly. TAKE AT BEDTIME      Elastic Bandages & Supports (JOBST 20-30MMHG COMPRESSION SM) Does not apply Misc Thigh high compression, 20-30mmHg  Need zipper as pt has arthritis of hands 4 each 0    omeprazole 20 MG Oral Capsule Delayed Release Take 1 capsule (20 mg total) by mouth before breakfast. 90 capsule 3    Ergocalciferol (VITAMIN D OR) Take 400 Units by mouth daily.    (Patient not taking: Reported on 2023)         Allergies:  No Known Allergies      Depression Screening (PHQ-2/PHQ-9): Over the LAST 2 WEEKS   Little interest or pleasure in doing things: Not at all         PHQ-2 SCORE: 0           ROS:   Review of Systems   All other systems reviewed and are negative. PHYSICAL EXAM:     Vitals:    12/05/23 0913   BP: 137/82   BP Location: Right arm   Patient Position: Sitting   Cuff Size: adult   Pulse: 78   Resp: 18   SpO2: 98%   Weight: 128 lb (58.1 kg)   Height: 5' 0.7\" (1.542 m)     Physical Exam  Vitals reviewed. Constitutional:       General: She is not in acute distress. Cardiovascular:      Rate and Rhythm: Normal rate. Pulmonary:      Effort: Pulmonary effort is normal. No respiratory distress. Neurological:      Mental Status: She is alert. Motor: Tremor present. Psychiatric:         Mood and Affect: Mood normal.         ASSESSMENT/PLAN:   (G25.0) Essential tremor  (primary encounter diagnosis)  Plan: Neuro Referral - [facility] ([place])    -Propranolol refilled. Neurology referral placed. Follow-up as needed    (N39.3) Stress incontinence  Plan: oxybutynin ER 5 MG Oral Tablet 24 Hr  -Oxybutynin refilled. Follow-up as needed. (M06.09) Rheumatoid arthritis of multiple sites with negative rheumatoid factor (HCC)  Plan:   -Currently stable, patient will let me know if symptoms become more severe/frequent.    (E28.39) Estrogen deficiency  Plan: XR DEXA BONE DENSITOMETRY (CPT=77080)  -Bone density scan in 2018, needs to be repeated. Order placed. Responsible party/patient verbalized understanding of information discussed. No barriers to learning observed. Orders This Visit:  No orders of the defined types were placed in this encounter. Meds This Visit:  Requested Prescriptions     Signed Prescriptions Disp Refills    Propranolol HCl  MG Oral Capsule SR 24 Hr 30 capsule 0     Sig: Take 1 capsule (160 mg total) by mouth every morning.     oxybutynin ER 5 MG Oral Tablet 24 Hr 90 tablet 3     Sig: TAKE 1 TABLET(5 MG) BY MOUTH DAILY       Imaging & Referrals:  NEURO - INTERNAL  XR DEXA BONE DENSITOMETRY (CPT=77080)       The 21st Century cures Act makes medical notes like these available to patients in the interest of transparency. However, be advised that this is a medical document. It is intended as peer to peer communication. It is written in medical language and may contain abbreviations or verbiage that are unfamiliar. It may appear blunt or direct. Medical documents are intended to carry relevant information, facts as evident, and the clinical opinion of the practitioner. This note was created by [employer] voice recognition. Errors in content may be related to improper recognition by the system; efforts to review and correct have been done but errors may still exist. Please contact me with any questions.        12/5/2023  Joey Kemp MD

## 2023-12-06 RX ORDER — PROPRANOLOL HYDROCHLORIDE 160 MG/1
1 CAPSULE, EXTENDED RELEASE ORAL EVERY MORNING
Qty: 90 CAPSULE | Refills: 0 | Status: SHIPPED | OUTPATIENT
Start: 2023-12-06

## 2023-12-07 ENCOUNTER — TELEPHONE (OUTPATIENT)
Dept: FAMILY MEDICINE CLINIC | Facility: CLINIC | Age: 73
End: 2023-12-07

## 2023-12-08 RX ORDER — TOPIRAMATE 25 MG/1
25 TABLET ORAL DAILY
Qty: 90 TABLET | Refills: 0 | Status: SHIPPED | OUTPATIENT
Start: 2023-12-08

## 2023-12-08 NOTE — TELEPHONE ENCOUNTER
FW: Medication question  Received: Today  Tiffani Amaya MD  P Em Rn Triage         Previous Messages       ----- Message -----  From: Ty Reyes RN  Sent: 12/7/2023  12:54 PM CST  To: Erlin Montoya MD  Subject: Medication question                              The above patient has called our office twice about topiramate refill. States she was seen in office on 12/5.  She requests a call at [phone]    Thank you,  [facility] Hospital[facility]ARD OSEGUERA

## 2024-01-04 ENCOUNTER — TELEPHONE (OUTPATIENT)
Dept: FAMILY MEDICINE CLINIC | Facility: CLINIC | Age: 74
End: 2024-01-04

## 2024-01-04 NOTE — TELEPHONE ENCOUNTER
Dr. Peterson, please advise on Myrbetriq in place of oxybutynin or other recommendations. Thank you.    Neurology recommended discontinuing medication below and prescribing Myrbetriq in it's place:  Medication Quantity Refills Start End   oxybutynin ER 5 MG Oral Tablet 24 Hr 90 tablet 3 12/5/2023 --   Sig:   TAKE 1 TABLET(5 MG) BY MOUTH DAILY     Route:   (none)     Pharmacy  [id number] [address], [phone], [phone]     Neurology progress note 01/02/23 accessed via CareEverywhere:  Patient Instructions  - documented in this encounter  Patient Instructions  Anna Harper PA-C - 01/02/2024 8:00 AM CST   Formatting of this note is different from the original.  -Continue propranolol LA  -Decrease amitriptyline to 25mg per night. This may be causing your twitching and tremor. After 1 month, we will check back in and see how that's going.  -talk to your primary care provider to see if they could switch you from oxybutynin to Myrbetriq-- this should help with your dry eyes.    8am 4pm Bedtime  Propranolol LA 160mg 1  Amitriptyline 25mg 1    Electronically signed by Anna Harper PA-C at 01/02/2024 8:37 AM CST

## 2024-01-04 NOTE — TELEPHONE ENCOUNTER
Patients daughter called on her behalf stating they went to see the neurologist and they recommended that she is taken off of the following medication:        oxybutynin ER 5 MG Oral Tablet 24 Hr         Per daughter they recommended that she starts using the following mediation instead:    Myrbetriq

## 2024-01-04 NOTE — TELEPHONE ENCOUNTER
Left detailed message   Left message to call back.  Dr Peterson will be out of office  til 1/21/24, VIVIANA Alvarado has appts next week

## 2024-01-08 NOTE — TELEPHONE ENCOUNTER
Spoke with patient's daughter and scheduled appointment as advised per Dr. Peterson    Future Appointments   Date Time Provider Department Center   1/15/2024  9:30 AM Jenny Carcamo APRN [facility]

## 2024-01-09 ENCOUNTER — PATIENT OUTREACH (OUTPATIENT)
Dept: CASE MANAGEMENT | Age: 74
End: 2024-01-09

## 2024-01-09 NOTE — PROCEDURES
Received order requesting to update PCP to Dr. Curtis Peterson is Denied and finalized on January 9, 2024.    [facility] Medicare Advantage patient is listed on the December 2023 [facility] eligibility list:    [facility] Attributed PCP is Dr. Jovanna Rivera.    Office, please contact patient to verify PCP. Inform patient they must contact [facility] to provide the name of current PCP.     Office please hold until the January/February [facility] eligibility list is available and then please verify if patient is listed. After office has verified patient is no longer listed on the [facility] monthly eligibility list and not assigned to Dr. Rivera then office can resubmit a new order for PCP removal request.      Thanks,  [facility] Team

## 2024-01-15 ENCOUNTER — OFFICE VISIT (OUTPATIENT)
Dept: FAMILY MEDICINE CLINIC | Facility: CLINIC | Age: 74
End: 2024-01-15

## 2024-01-15 ENCOUNTER — TELEPHONE (OUTPATIENT)
Dept: FAMILY MEDICINE CLINIC | Facility: CLINIC | Age: 74
End: 2024-01-15

## 2024-01-15 VITALS
SYSTOLIC BLOOD PRESSURE: 138 MMHG | OXYGEN SATURATION: 99 % | BODY MASS INDEX: 21.51 KG/M2 | DIASTOLIC BLOOD PRESSURE: 80 MMHG | HEART RATE: 69 BPM | HEIGHT: 64 IN | TEMPERATURE: 98 F | WEIGHT: 126 LBS

## 2024-01-15 DIAGNOSIS — N39.3 STRESS INCONTINENCE: Primary | ICD-10-CM

## 2024-01-15 DIAGNOSIS — Z12.83 SKIN CANCER SCREENING: ICD-10-CM

## 2024-01-15 DIAGNOSIS — Z12.83 SKIN CANCER SCREENING: Primary | ICD-10-CM

## 2024-01-15 DIAGNOSIS — R68.2 DRY MOUTH: ICD-10-CM

## 2024-01-15 DIAGNOSIS — N32.81 OVERACTIVE BLADDER: ICD-10-CM

## 2024-01-15 DIAGNOSIS — Z41.1 ENCOUNTER FOR COSMETIC PROCEDURE: ICD-10-CM

## 2024-01-15 DIAGNOSIS — G25.0 ESSENTIAL TREMOR: ICD-10-CM

## 2024-01-15 PROCEDURE — 3079F DIAST BP 80-89 MM HG: CPT

## 2024-01-15 PROCEDURE — 1126F AMNT PAIN NOTED NONE PRSNT: CPT

## 2024-01-15 PROCEDURE — 3075F SYST BP GE 130 - 139MM HG: CPT

## 2024-01-15 PROCEDURE — 99214 OFFICE O/P EST MOD 30 MIN: CPT

## 2024-01-15 PROCEDURE — 1170F FXNL STATUS ASSESSED: CPT

## 2024-01-15 PROCEDURE — 3008F BODY MASS INDEX DOCD: CPT

## 2024-01-15 PROCEDURE — 1159F MED LIST DOCD IN RCRD: CPT

## 2024-01-15 PROCEDURE — 1160F RVW MEDS BY RX/DR IN RCRD: CPT

## 2024-01-15 RX ORDER — MIRABEGRON 25 MG/1
25 TABLET, FILM COATED, EXTENDED RELEASE ORAL DAILY
Qty: 90 TABLET | Refills: 0 | Status: SHIPPED | OUTPATIENT
Start: 2024-01-15

## 2024-01-15 RX ORDER — AMITRIPTYLINE HYDROCHLORIDE 25 MG/1
25 TABLET, FILM COATED ORAL NIGHTLY
COMMUNITY
Start: 2024-01-02

## 2024-01-15 RX ORDER — MIRABEGRON 25 MG/1
25 TABLET, FILM COATED, EXTENDED RELEASE ORAL DAILY
Qty: 30 TABLET | Refills: 0 | Status: SHIPPED | OUTPATIENT
Start: 2024-01-15 | End: 2024-01-15

## 2024-01-15 NOTE — PROGRESS NOTES
Kimberlyn Marcial is a 73 year old female.  Chief Complaint   Patient presents with    Medication Follow-Up     Here to follow up on oxybutynin . Pt was told medication might cause dryness to eyes and left eye trembles a lot  and would like to discuss another option ( per neurologist).     HPI:   Kimberlyn Marcial presented to the clinic for follow up neurologist on 1/2/24 for essential tremors. Neurologist decreased amitriptyline to 25 mg nightly. Patient states no improvement with tremors. Has follow up scheduled in 2 weeks with neurologist.     Additionally, was recommended by neurologist to discontinue oxybutynin and start Myrbetriq to try to improve dry mouth. Patient interested in medication adjustment.     Current Outpatient Medications   Medication Sig Dispense Refill    amitriptyline 25 MG Oral Tab Take 1 tablet (25 mg total) by mouth nightly.      topiramate 25 MG Oral Tab Take 1 tablet (25 mg total) by mouth daily. 90 tablet 0    oxybutynin ER 5 MG Oral Tablet 24 Hr TAKE 1 TABLET(5 MG) BY MOUTH DAILY 90 tablet 3    Spacer/Aero-Holding Chambers Does not apply Device Use inhaler with Albuterol as needed 1 each 0    Multiple Vitamins-Minerals (MULTIVITAMIN ADULTS) Oral Tab Take 1 tablet by mouth daily.      Elastic Bandages & Supports (JOBST 20-30MMHG COMPRESSION SM) Does not apply Misc Thigh high compression, 20-30mmHg  Need zipper as pt has arthritis of hands 4 each 0    omeprazole 20 MG Oral Capsule Delayed Release Take 1 capsule (20 mg total) by mouth before breakfast. 90 capsule 3    Ergocalciferol (VITAMIN D OR) Take 400 Units by mouth daily.      Propranolol HCl  MG Oral Capsule SR 24 Hr Take 1 capsule (160 mg total) by mouth every morning. (Patient not taking: Reported on 1/15/2024) 90 capsule 0    albuterol 108 (90 Base) MCG/ACT Inhalation Aero Soln Inhale 2 puffs into the lungs every 4 (four) hours as needed. (Patient not taking: Reported on 1/15/2024) 18 g 0    amitriptyline 50 MG Oral Tab Take 1 tablet  (50 mg total) by mouth nightly. TAKE AT BEDTIME (Patient not taking: Reported on 1/15/2024)        Past Medical History:   Diagnosis Date    Allergic dermatitis     Depression     Dyschromia     Hiatal hernia     History of anxiety     per [employer]:     Osteoarthritis     Osteoarthrosis     Radial styloid tenosynovitis     Rheumatoid arthritis (HCC)     Sinus tachycardia 16    neg stress, EKG normal    Spondylolisthesis     L4-L5    Tremors of nervous system     Vocal cord polyp       Past Surgical History:   Procedure Laterality Date    BACK SURGERY  11    Posterior spinal fusion and  instrumentation minimal invasive at L4-L5 with laminectomy, L4-L5 posterolateral fusion. Dr. He Ovalles    BREAST SURGERY Bilateral     for cosmetic reason    COLONOSCOPY  2020    COLONOSCOPY N/A 2020    Procedure: COLONOSCOPY, POSSIBLE BIOPSY, POSSIBLE POLYPECTOMY 46666;  Surgeon: Robe Gutierres MD;  Location: [facility], [facility]    HYSTERECTOMY      fibroids    KNEE ARTHROSCOPY Right     KNEE ARTHROSCOPY Left  &     arthroscope x2    OOPHORECTOMY Bilateral 10/29/01    salpingo-oophorectomy. Dr. Shayy Galeas    OTHER SURGICAL HISTORY  04    Procedure for prolapsed hemorrhoids; both external and internal complicated hemorrhoids. Dr. EJ Villar      Social History:  Social History     Socioeconomic History    Marital status:    Tobacco Use    Smoking status: Former     Years: 6     Types: Cigarettes     Quit date: 1973     Years since quittin.0    Smokeless tobacco: Never   Vaping Use    Vaping Use: Never used   Substance and Sexual Activity    Alcohol use: Yes     Comment: 1-2 drinks occasionally, 3-4 times per year.    Drug use: No   Other Topics Concern    Caffeine Concern Yes     Comment: 1-2 large cups coffee daily      Family History   Problem Relation Age of Onset    Cancer Father         abdominal    Arthritis Mother     Cancer Mother         ovarian     Ovarian Cancer Mother         36    Cancer Paternal Uncle         brain    Ovarian Cancer Sister         24      No Known Allergies     REVIEW OF SYSTEMS:   Review of Systems   Constitutional:  Negative for activity change.   Respiratory:  Negative for chest tightness and shortness of breath.    Cardiovascular:  Negative for chest pain and palpitations.   Neurological:  Positive for tremors.   Psychiatric/Behavioral: Negative.     All other systems reviewed and are negative.     Wt Readings from Last 5 Encounters:   01/15/24 126 lb (57.2 kg)   12/05/23 128 lb (58.1 kg)   11/01/23 128 lb 3.2 oz (58.2 kg)   07/05/23 130 lb (59 kg)   03/27/23 129 lb (58.5 kg)     Body mass index is 21.63 kg/m².      EXAM:   /80 (BP Location: Right arm, Patient Position: Sitting, Cuff Size: adult)   Pulse 69   Temp 97.8 °F (36.6 °C) (Temporal)   Ht 5' 4\" (1.626 m)   Wt 126 lb (57.2 kg)   SpO2 99%   BMI 21.63 kg/m²   Physical Exam  Vitals reviewed.   Constitutional:       Appearance: Normal appearance.   HENT:      Head: Normocephalic and atraumatic.   Cardiovascular:      Rate and Rhythm: Normal rate and regular rhythm.      Pulses: Normal pulses.      Heart sounds: Normal heart sounds.   Pulmonary:      Effort: Pulmonary effort is normal.      Breath sounds: Normal breath sounds.   Neurological:      General: No focal deficit present.      Mental Status: She is alert and oriented to person, place, and time.   Psychiatric:         Mood and Affect: Mood normal.         Behavior: Behavior normal.            ASSESSMENT AND PLAN:   (N39.3) Stress incontinence  (primary encounter diagnosis)  (N32.81) Overactive bladder  (R68.2) Dry mouth  Plan: Mirabegron ER (MYRBETRIQ) 25         MG Oral Tablet 24 Hr  Patient follow up from neurology appointment on 1/2/24. Patient with c/o dry mouth. Neurologist recommended stopping oxybutynin and starting Mirabegron. Script to pharmacy. Advised to follow up in 6 weeks to monitor.     (G25.0)  Essential tremor  Plan: reviewed neurology note. Patient was advised to decrease amitriptyline to 25mg. States no improvement yet. Has follow up with neurologist in 2 weeks. continue to follow with neurologist as scheduled.     (Z12.83) Skin cancer screening  Plan: Derm Referral - In Network  Interested in following with dermatology. Referral placed. Advised to schedule.       Follow up in 6 weeks.       The patient indicates understanding of these issues and agrees to the plan.    This note was prepared using Dragon Medical voice recognition dictation software. As a result errors may occur. When identified these errors have been corrected. While every attempt is made to correct errors during dictation discrepancies may still exist.

## 2024-01-15 NOTE — TELEPHONE ENCOUNTER
Patient is looking for facial fillers. This is completed by dermatology.  Dr. Bray does not do cosmetic dermatology Dr. Mock does. Referral placed for both. - MICHELLE Morgan

## 2024-01-15 NOTE — TELEPHONE ENCOUNTER
Patient is calling to advise provider after today's office visit of 1/15/2024 that she was given a referral to see a dermatologist but she was looking for a reference to a plastic surgeon.   Please advise regarding the plastic surgeon.

## 2024-02-15 DIAGNOSIS — N32.81 OVERACTIVE BLADDER: ICD-10-CM

## 2024-02-15 DIAGNOSIS — N39.3 STRESS INCONTINENCE: ICD-10-CM

## 2024-02-16 RX ORDER — MIRABEGRON 25 MG/1
25 TABLET, FILM COATED, EXTENDED RELEASE ORAL DAILY
Qty: 90 TABLET | Refills: 3 | Status: SHIPPED | OUTPATIENT
Start: 2024-02-16

## 2024-02-16 NOTE — TELEPHONE ENCOUNTER
Refill passed per [facility] protocol.  Requested Prescriptions   Pending Prescriptions Disp Refills    MYRBETRIQ 25 MG Oral Tablet 24 Hr [Pharmacy Med Name: MYRBETRIQ 25MG TABLETS] 30 tablet 0     Sig: TAKE 1 TABLET(25 MG) BY MOUTH DAILY       Genitourinary Medications Passed - 2/15/2024  8:54 PM        Passed - Patient does not have pulmonary hypertension on problem list        Passed - In person appointment or virtual visit in the past 12 mos or appointment in next 3 mos     Recent Outpatient Visits              [facility]    [facility]    2 months ago Essential tremor    [facility] Curtis Peterson MD    Office Visit    3 months ago Suspected COVID-19 virus infection    [facility] [place]    Office Visit    7 months ago Primary osteoarthritis of both knees    [facility], [address], [place],     Office Visit    10 months ago Encounter for annual health examination    [facility], [address], [place],     Office Visit                         Recent Outpatient Visits              1 month ago Stress incontinence    [facility] Carlos AlbertoJenny APRN    Office Visit    2 months ago Essential tremor    [facility] Curtis Peterson MD    Office Visit    3 months ago Suspected COVID-19 virus infection    [facility] [place]    Office Visit    7 months ago Primary osteoarthritis of both knees    [facility], [address], [place]    Office Visit    10 months ago Encounter for annual health examination    [facility], [address], [place]    Office  Visit

## 2024-03-14 RX ORDER — TOPIRAMATE 25 MG/1
25 TABLET ORAL DAILY
Qty: 90 TABLET | Refills: 3 | Status: SHIPPED | OUTPATIENT
Start: 2024-03-14

## 2024-03-14 NOTE — TELEPHONE ENCOUNTER
Refill passed per [facility] protocol.  Requested Prescriptions   Pending Prescriptions Disp Refills    TOPIRAMATE 25 MG Oral Tab [Pharmacy Med Name: TOPIRAMATE 25MG TABLETS] 90 tablet 0     Sig: TAKE 1 TABLET(25 MG) BY MOUTH DAILY       Neurology Medications Passed - 3/13/2024 10:12 AM        Passed - In person appointment or virtual visit in the past 6 mos or appointment in next 3 mos     Recent Outpatient Visits              1 month ago Stress incontinence    [facility] Jenny Carcamo APRN    Office Visit    3 months ago Essential tremor    [facility] Curtis Peterson MD    Office Visit    4 months ago Suspected COVID-19 virus infection    [facility] Breonna Ny APRN    Office Visit    8 months ago Primary osteoarthritis of both knees    [facility], [address], [place],     Office Visit    11 months ago Encounter for annual health examination    [facility], [address], [place],     Office Visit                         Recent Outpatient Visits              1 month ago Stress incontinence    [facility] Jenny Carcamo APRN    Office Visit    3 months ago Essential tremor    [facility] Curtis Peterson MD    Office Visit    4 months ago Suspected COVID-19 virus infection    [facility] [place]    Office Visit    8 months ago Primary osteoarthritis of both knees    [facility], [address], [place],     Office Visit    11 months ago Encounter for annual health examination    [facility], [address], [place],     Office Visit

## 2024-04-05 DIAGNOSIS — K44.9 HIATAL HERNIA: ICD-10-CM

## 2024-04-05 RX ORDER — OMEPRAZOLE 20 MG/1
20 CAPSULE, DELAYED RELEASE ORAL
Qty: 90 CAPSULE | Refills: 3 | OUTPATIENT
Start: 2024-04-05

## 2024-04-05 NOTE — TELEPHONE ENCOUNTER
Pt has new pcp    Current refill request refused due to refill is either a duplicate request or has active refills at the pharmacy.  Check previous templates.    Requested Prescriptions     Refused Prescriptions Disp Refills    OMEPRAZOLE 20 MG Oral Capsule Delayed Release [Pharmacy Med Name: OMEPRAZOLE 20MG CAPSULES] 90 capsule 3     Sig: TAKE 1 CAPSULE(20 MG) BY MOUTH BEFORE BREAKFAST     Refused By: CRISTÓBAL LASSITER     Reason for Refusal: Prescriber not at this practice

## 2024-04-10 ENCOUNTER — TELEPHONE (OUTPATIENT)
Dept: FAMILY MEDICINE CLINIC | Facility: CLINIC | Age: 74
End: 2024-04-10

## 2024-04-10 DIAGNOSIS — K44.9 HIATAL HERNIA: ICD-10-CM

## 2024-04-10 NOTE — TELEPHONE ENCOUNTER
Dr. Peterson, not previously ordered by you. She has an upcoming appointment with you on 4/24/24. Please advise.

## 2024-04-10 NOTE — TELEPHONE ENCOUNTER
Patient needs a refill for:    omeprazole 20 MG Oral Capsule Delayed Release 90 capsule 3 3/7/2023 --   Sig:   Take 1 capsule (20 mg total) by mouth before breakfast.     Route:   Oral       Patient no longer sees the original prescribing doctor.      [id number] [address], [phone], [phone]

## 2024-04-10 NOTE — TELEPHONE ENCOUNTER
Patient is due for annual physical and follow up from 1/15/24 appointment with Jenny Carcamo. Please help patient schedule annual physical.  Blood work will be done at physical.

## 2024-04-11 RX ORDER — OMEPRAZOLE 20 MG/1
20 CAPSULE, DELAYED RELEASE ORAL
Qty: 90 CAPSULE | Refills: 3 | Status: SHIPPED | OUTPATIENT
Start: 2024-04-11

## 2024-04-24 ENCOUNTER — OFFICE VISIT (OUTPATIENT)
Dept: FAMILY MEDICINE CLINIC | Facility: CLINIC | Age: 74
End: 2024-04-24

## 2024-04-24 ENCOUNTER — LAB ENCOUNTER (OUTPATIENT)
Dept: LAB | Age: 74
End: 2024-04-24
Attending: FAMILY MEDICINE
Payer: MEDICARE

## 2024-04-24 VITALS
OXYGEN SATURATION: 99 % | RESPIRATION RATE: 18 BRPM | HEART RATE: 73 BPM | BODY MASS INDEX: 24.11 KG/M2 | DIASTOLIC BLOOD PRESSURE: 79 MMHG | SYSTOLIC BLOOD PRESSURE: 135 MMHG | WEIGHT: 131 LBS | HEIGHT: 62 IN

## 2024-04-24 DIAGNOSIS — Z12.31 ENCOUNTER FOR SCREENING MAMMOGRAM FOR MALIGNANT NEOPLASM OF BREAST: ICD-10-CM

## 2024-04-24 DIAGNOSIS — M06.09 RHEUMATOID ARTHRITIS OF MULTIPLE SITES WITH NEGATIVE RHEUMATOID FACTOR (HCC): ICD-10-CM

## 2024-04-24 DIAGNOSIS — R25.1 TREMORS OF NERVOUS SYSTEM: ICD-10-CM

## 2024-04-24 DIAGNOSIS — N39.3 STRESS INCONTINENCE: ICD-10-CM

## 2024-04-24 DIAGNOSIS — M15.9 PRIMARY OSTEOARTHRITIS INVOLVING MULTIPLE JOINTS: ICD-10-CM

## 2024-04-24 DIAGNOSIS — Z00.00 ANNUAL PHYSICAL EXAM: Primary | ICD-10-CM

## 2024-04-24 LAB
ALBUMIN SERPL-MCNC: 4.1 G/DL (ref 3.2–4.8)
ALBUMIN/GLOB SERPL: 1.4 {RATIO} (ref 1–2)
ALP LIVER SERPL-CCNC: 103 U/L
ALT SERPL-CCNC: 13 U/L
ANION GAP SERPL CALC-SCNC: 4 MMOL/L (ref 0–18)
AST SERPL-CCNC: 21 U/L (ref ?–34)
BASOPHILS # BLD AUTO: 0.05 X10(3) UL (ref 0–0.2)
BASOPHILS NFR BLD AUTO: 0.8 %
BILIRUB SERPL-MCNC: 0.4 MG/DL (ref 0.2–1.1)
BUN BLD-MCNC: 13 MG/DL (ref 9–23)
BUN/CREAT SERPL: 15.7 (ref 10–20)
CALCIUM BLD-MCNC: 9.3 MG/DL (ref 8.7–10.4)
CHLORIDE SERPL-SCNC: 105 MMOL/L (ref 98–112)
CHOLEST SERPL-MCNC: 183 MG/DL (ref ?–200)
CO2 SERPL-SCNC: 26 MMOL/L (ref 21–32)
CREAT BLD-MCNC: 0.83 MG/DL
DEPRECATED RDW RBC AUTO: 41.4 FL (ref 35.1–46.3)
EGFRCR SERPLBLD CKD-EPI 2021: 74 ML/MIN/1.73M2 (ref 60–?)
EOSINOPHIL # BLD AUTO: 0.13 X10(3) UL (ref 0–0.7)
EOSINOPHIL NFR BLD AUTO: 2 %
ERYTHROCYTE [DISTWIDTH] IN BLOOD BY AUTOMATED COUNT: 12.6 % (ref 11–15)
FASTING PATIENT LIPID ANSWER: YES
FASTING STATUS PATIENT QL REPORTED: YES
GLOBULIN PLAS-MCNC: 3 G/DL (ref 2.8–4.4)
GLUCOSE BLD-MCNC: 98 MG/DL (ref 70–99)
HCT VFR BLD AUTO: 37 %
HDLC SERPL-MCNC: 47 MG/DL (ref 40–59)
HGB BLD-MCNC: 11.8 G/DL
IMM GRANULOCYTES # BLD AUTO: 0.02 X10(3) UL (ref 0–1)
IMM GRANULOCYTES NFR BLD: 0.3 %
LDLC SERPL CALC-MCNC: 120 MG/DL (ref ?–100)
LYMPHOCYTES # BLD AUTO: 2.24 X10(3) UL (ref 1–4)
LYMPHOCYTES NFR BLD AUTO: 34.7 %
MCH RBC QN AUTO: 28.9 PG (ref 26–34)
MCHC RBC AUTO-ENTMCNC: 31.9 G/DL (ref 31–37)
MCV RBC AUTO: 90.7 FL
MONOCYTES # BLD AUTO: 0.51 X10(3) UL (ref 0.1–1)
MONOCYTES NFR BLD AUTO: 7.9 %
NEUTROPHILS # BLD AUTO: 3.5 X10 (3) UL (ref 1.5–7.7)
NEUTROPHILS # BLD AUTO: 3.5 X10(3) UL (ref 1.5–7.7)
NEUTROPHILS NFR BLD AUTO: 54.3 %
NONHDLC SERPL-MCNC: 136 MG/DL (ref ?–130)
OSMOLALITY SERPL CALC.SUM OF ELEC: 280 MOSM/KG (ref 275–295)
PLATELET # BLD AUTO: 288 10(3)UL (ref 150–450)
POTASSIUM SERPL-SCNC: 4.3 MMOL/L (ref 3.5–5.1)
PROT SERPL-MCNC: 7.1 G/DL (ref 5.7–8.2)
RBC # BLD AUTO: 4.08 X10(6)UL
SODIUM SERPL-SCNC: 135 MMOL/L (ref 136–145)
TRIGL SERPL-MCNC: 85 MG/DL (ref 30–149)
TSI SER-ACNC: 3.09 MIU/ML (ref 0.55–4.78)
VLDLC SERPL CALC-MCNC: 15 MG/DL (ref 0–30)
WBC # BLD AUTO: 6.5 X10(3) UL (ref 4–11)

## 2024-04-24 PROCEDURE — 80061 LIPID PANEL: CPT | Performed by: FAMILY MEDICINE

## 2024-04-24 PROCEDURE — 3008F BODY MASS INDEX DOCD: CPT | Performed by: FAMILY MEDICINE

## 2024-04-24 PROCEDURE — 1159F MED LIST DOCD IN RCRD: CPT | Performed by: FAMILY MEDICINE

## 2024-04-24 PROCEDURE — 1126F AMNT PAIN NOTED NONE PRSNT: CPT | Performed by: FAMILY MEDICINE

## 2024-04-24 PROCEDURE — 1160F RVW MEDS BY RX/DR IN RCRD: CPT | Performed by: FAMILY MEDICINE

## 2024-04-24 PROCEDURE — 3075F SYST BP GE 130 - 139MM HG: CPT | Performed by: FAMILY MEDICINE

## 2024-04-24 PROCEDURE — 80053 COMPREHEN METABOLIC PANEL: CPT | Performed by: FAMILY MEDICINE

## 2024-04-24 PROCEDURE — 84443 ASSAY THYROID STIM HORMONE: CPT | Performed by: FAMILY MEDICINE

## 2024-04-24 PROCEDURE — G0439 PPPS, SUBSEQ VISIT: HCPCS | Performed by: FAMILY MEDICINE

## 2024-04-24 PROCEDURE — 85025 COMPLETE CBC W/AUTO DIFF WBC: CPT | Performed by: FAMILY MEDICINE

## 2024-04-24 PROCEDURE — 3078F DIAST BP <80 MM HG: CPT | Performed by: FAMILY MEDICINE

## 2024-04-24 PROCEDURE — 1170F FXNL STATUS ASSESSED: CPT | Performed by: FAMILY MEDICINE

## 2024-04-24 PROCEDURE — 36415 COLL VENOUS BLD VENIPUNCTURE: CPT | Performed by: FAMILY MEDICINE

## 2024-04-24 PROCEDURE — 96160 PT-FOCUSED HLTH RISK ASSMT: CPT | Performed by: FAMILY MEDICINE

## 2024-04-24 NOTE — H&P
Subjective:   Kimberlyn Marcial is a 73 year old female who presents for a Medicare Subsequent Annual Wellness visit (Pt already had Initial Annual Wellness) and scheduled follow up of multiple significant but stable problems.       History/Other:   Fall Risk Assessment:   She has been screened for Falls and is low risk.      Cognitive Assessment:   She had a completely normal cognitive assessment - see flowsheet entries     Functional Ability/Status:   Kimberlyn Marcial has some abnormal functions as listed below:  She has Vision problems based on screening of functional status. She has problems with Memory based on screening of functional status.       Depression Screening (PHQ-2/PHQ-9): PHQ-2 SCORE: 0  , done 4/24/2024   Advanced Directives:   She does have a Living Will but we do NOT have it on file in Epic.    She does have a POA but we do NOT have it on file in Epic.    Discussed Advance Care Planning with patient (and family/surrogate if present). Standard forms made available to patient in After Visit Summary.      Patient Active Problem List   Diagnosis    Tremors of nervous system    Varicose veins of lower extremities with other complications    Rheumatoid arthritis of multiple sites with negative rheumatoid factor (HCC)    Primary osteoarthritis involving multiple joints    Hx of gastroesophageal reflux (GERD)    Hiatal hernia    Diverticulitis     Allergies:  She has No Known Allergies.    Current Medications:  Outpatient Medications Marked as Taking for the 4/24/24 encounter (Office Visit) with Curtis Peterson MD   Medication Sig    omeprazole 20 MG Oral Capsule Delayed Release Take 1 capsule (20 mg total) by mouth before breakfast.    Mirabegron ER (MYRBETRIQ) 25 MG Oral Tablet 24 Hr Take 1 tablet (25 mg total) by mouth daily.    amitriptyline 25 MG Oral Tab Take 1 tablet (25 mg total) by mouth nightly.    Spacer/Aero-Holding Chambers Does not apply Device Use inhaler with Albuterol as needed    Multiple  Vitamins-Minerals (MULTIVITAMIN ADULTS) Oral Tab Take 1 tablet by mouth daily.    Ergocalciferol (VITAMIN D OR) Take 400 Units by mouth daily.     Current Facility-Administered Medications for the 24 encounter (Office Visit) with Curtis Peterson MD   Medication    methylPREDNISolone acetate (DEPO-medrol) 80 MG/ML injection 80 mg       Medical History:  She  has a past medical history of Allergic dermatitis, Depression, Dyschromia, Hiatal hernia, History of anxiety, Osteoarthritis, Osteoarthrosis, Radial styloid tenosynovitis, Rheumatoid arthritis (HCC), Sinus tachycardia (16), Spondylolisthesis, Tremors of nervous system, and Vocal cord polyp.  Surgical History:  She  has a past surgical history that includes knee arthroscopy (Right, ); knee arthroscopy (Left,  & ); Breast Surgery (Bilateral, ); back surgery (11); other surgical history (04); hysterectomy (); oophorectomy (Bilateral, 10/29/01); colonoscopy (2020); and colonoscopy (N/A, 2020).   Family History:  Her family history includes Arthritis in her mother; Cancer in her father, mother, and paternal uncle; Ovarian Cancer in her mother and sister.  Social History:  She  reports that she quit smoking about 51 years ago. Her smoking use included cigarettes. She started smoking about 57 years ago. She has never used smokeless tobacco. She reports current alcohol use. She reports that she does not use drugs.    Tobacco:  She smoked tobacco in the past but quit greater than 12 months ago.  Social History     Tobacco Use   Smoking Status Former    Current packs/day: 0.00    Types: Cigarettes    Start date: 1967    Quit date: 1973    Years since quittin.3   Smokeless Tobacco Never        CAGE Alcohol Screen:   CAGE screening score of 0 on 2024, showing low risk of alcohol abuse.      Patient Care Team:  Curtis Peterson MD as PCP - General (Family Medicine)  Kevin Parker MD (NEUROLOGY)  Bhavik  Robe PEREZ MD as Gastroenterologist (GASTROENTEROLOGY)    Review of Systems   All other systems reviewed and are negative.      Objective:   Physical Exam  Vitals reviewed.   Constitutional:       General: She is not in acute distress.  HENT:      Head: Normocephalic and atraumatic.      Right Ear: Tympanic membrane, ear canal and external ear normal.      Left Ear: Tympanic membrane, ear canal and external ear normal.      Nose: Nose normal.      Mouth/Throat:      Pharynx: Uvula midline.   Eyes:      Conjunctiva/sclera: Conjunctivae normal.      Pupils: Pupils are equal, round, and reactive to light.   Neck:      Thyroid: No thyromegaly.   Cardiovascular:      Rate and Rhythm: Normal rate and regular rhythm.      Heart sounds: Normal heart sounds. No murmur heard.  Pulmonary:      Effort: Pulmonary effort is normal. No respiratory distress.      Breath sounds: Normal breath sounds. No wheezing or rales.   Abdominal:      General: Bowel sounds are normal. There is no distension.      Palpations: Abdomen is soft.      Tenderness: There is no abdominal tenderness. There is no guarding or rebound.   Musculoskeletal:      Cervical back: Normal range of motion and neck supple.   Lymphadenopathy:      Cervical: No cervical adenopathy.   Neurological:      Mental Status: She is alert.         /79 (BP Location: Right arm, Patient Position: Sitting, Cuff Size: adult)   Pulse 73   Resp 18   Ht 5' 2\" (1.575 m)   Wt 131 lb (59.4 kg)   SpO2 99%   BMI 23.96 kg/m²  Estimated body mass index is 23.96 kg/m² as calculated from the following:    Height as of this encounter: 5' 2\" (1.575 m).    Weight as of this encounter: 131 lb (59.4 kg).    Medicare Hearing Assessment:   Hearing Screening    Time taken: 4/24/2024 11:10 AM  Entry User: Natividad Torer  Screening Method: Finger Rub  Finger Rub Result: Pass         Visual Acuity:   Right Eye Visual Acuity: Uncorrected Right Eye Chart Acuity: 20/20   Left Eye Visual Acuity:  Uncorrected Left Eye Chart Acuity: 20/20   Both Eyes Visual Acuity: Uncorrected Both Eyes Chart Acuity: 20/20   Able To Tolerate Visual Acuity: Yes        Assessment & Plan:   Kimberlyn Marcial is a 73 year old female who presents for a Medicare Assessment.     1. Annual physical exam (Primary)  -     Lipid Panel  -     Comp Metabolic Panel (14)  -     TSH W Reflex To Free T4  -     CBC With Differential With Platelet  -     Vaccines UTD   -     Next physical in 1 year   2. Rheumatoid arthritis of multiple sites with negative rheumatoid factor (HCC)        - stable, to continue current management per Rheumatology  3. Primary osteoarthritis involving multiple joints         -stable, to continue current management  4. Encounter for screening mammogram for malignant neoplasm of breast        - scheduled for 4/26/2024  5. Stress incontinence        - Symptoms have significantly improved with Myrbetric. To continue current management.   6. Tremors of nervous system        - to continue current management per Neurology   The patient indicates understanding of these issues and agrees to the plan.  Further testing ordered.  Imaging studies ordered.  Lab work ordered.  Reinforced healthy diet, lifestyle, and exercise.      No follow-ups on file.     Curtis Peterson MD, 4/24/2024     Supplementary Documentation:   General Health:  In the past six months, have you lost more than 10 pounds without trying?: 2 - No  Has your appetite been poor?: No  Type of Diet: Balanced  How does the patient maintain a good energy level?: Daily Walks  How would you describe your daily physical activity?: Moderate  How would you describe your current health state?: Good  How do you maintain positive mental well-being?: Visiting Friends;Visiting Family  On a scale of 0 to 10, with 0 being no pain and 10 being severe pain, what is your pain level?: 0 - (None)  In the past six months, have you experienced urine leakage?: 0-No  At any time do you feel  concerned for the safety/well-being of yourself and/or your children, in your home or elsewhere?: No  Have you had any immunizations at another office such as Influenza, Hepatitis B, Tetanus, or Pneumococcal?: No       Kimberlyn Marcial's SCREENING SCHEDULE   Tests on this list are recommended by your physician but may not be covered, or covered at this frequency, by your insurer.   Please check with your insurance carrier before scheduling to verify coverage.   PREVENTATIVE SERVICES FREQUENCY &  COVERAGE DETAILS LAST COMPLETION DATE   Diabetes Screening    Fasting Blood Sugar /  Glucose    One screening every 12 months if never tested or if previously tested but not diagnosed with pre-diabetes   One screening every 6 months if diagnosed with pre-diabetes Lab Results   Component Value Date    GLU 87 09/06/2022        Cardiovascular Disease Screening    Lipid Panel  Cholesterol  Lipoprotein (HDL)  Triglycerides Covered every 5 years for all Medicare beneficiaries without apparent signs or symptoms of cardiovascular disease Lab Results   Component Value Date    CHOLEST 206 (H) 09/06/2022    HDL 52 09/06/2022     (H) 09/06/2022    TRIG 91 09/06/2022         Electrocardiogram (EKG)   Covered if needed at Welcome to Medicare, and non-screening if indicated for medical reasons 10/20/2021      Ultrasound Screening for Abdominal Aortic Aneurysm (AAA) Covered once in a lifetime for one of the following risk factors    Men who are 65-75 years old and have ever smoked    Anyone with a family history -     Colorectal Cancer Screening  Covered for ages 50-85; only need ONE of the following:    Colonoscopy   Covered every 10 years    Covered every 2 years if patient is at high risk or previous colonoscopy was abnormal 07/28/2020    Health Maintenance   Topic Date Due    Colorectal Cancer Screening  07/28/2027       Flexible Sigmoidoscopy   Covered every 4 years -    Fecal Occult Blood Test Covered annually -   Bone Density  Screening    Bone density screening    Covered every 2 years after age 65 if diagnosed with risk of osteoporosis or estrogen deficiency.    Covered yearly for long-term glucocorticoid medication use (Steroids) Last Dexa Scan:    XR DEXA BONE DENSITOMETRY (CPT=77080) 03/12/2018      No recommendations at this time   Pap and Pelvic    Pap   Covered every 2 years for women at normal risk; Annually if at high risk -  No recommendations at this time    Chlamydia Annually if high risk -  No recommendations at this time   Screening Mammogram    Mammogram     Recommend annually for all female patients aged 40 and older    One baseline mammogram covered for patients aged 35-39 06/30/2023    Health Maintenance   Topic Date Due    Mammogram  06/30/2024       Immunizations    Influenza Covered once per flu season  Please get every year 09/19/2023  No recommendations at this time    Pneumococcal Each vaccine ([id number] & [id number]) covered once after 65 Prevnar 13: 07/01/2016    [id number]: 03/13/2019     No recommendations at this time    Hepatitis B One screening covered for patients with certain risk factors   -  No recommendations at this time    Tetanus Toxoid Not covered by Medicare Part B unless medically necessary (cut with metal); may be covered with your pharmacy prescription benefits -    Tetanus, Diptheria and Pertusis TD and TDaP Not covered by Medicare Part B -  No recommendations at this time    Zoster Not covered by Medicare Part B; may be covered with your pharmacy  prescription benefits -  No recommendations at this time     Annual Monitoring of Persistent Medications (ACE/ARB, digoxin diuretics, anticonvulsants)    Potassium Annually Lab Results   Component Value Date    K 4.3 09/06/2022         Creatinine   Annually Lab Results   Component Value Date    CREATSERUM 0.73 09/06/2022         BUN Annually Lab Results   Component Value Date    BUN 12 09/06/2022       Drug Serum Conc Annually No results found  for: \"DIGOXIN\", \"DIG\", \"VALP\"

## 2024-04-25 ENCOUNTER — TELEPHONE (OUTPATIENT)
Dept: FAMILY MEDICINE CLINIC | Facility: CLINIC | Age: 74
End: 2024-04-25

## 2024-04-25 NOTE — TELEPHONE ENCOUNTER
Patient is calling to report the name of the medications prescribed by her neurologist Dr. Anna Crawford. Per patient, medication treats her tremors.     Omeprazole 20mg   1 tablet a day   Propranalol 160mg    1 capsule a day   Amipriptyline 50mg    1 capsule a day   Oxybutynin 5mg    1 tablet a day     Patient is also reporting the name of the plastic surgeon who will be doing her face lift - Dr. Ankit Billy.

## 2024-04-26 ENCOUNTER — HOSPITAL ENCOUNTER (OUTPATIENT)
Dept: MAMMOGRAPHY | Facility: HOSPITAL | Age: 74
Discharge: HOME | End: 2024-04-26
Attending: INTERNAL MEDICINE
Payer: MEDICARE

## 2024-04-26 DIAGNOSIS — R92.8 ABNORMAL MAMMOGRAM: ICD-10-CM

## 2024-04-26 PROCEDURE — 77066 DX MAMMO INCL CAD BI: CPT | Performed by: INTERNAL MEDICINE

## 2024-04-26 PROCEDURE — 77062 BREAST TOMOSYNTHESIS BI: CPT | Performed by: INTERNAL MEDICINE

## 2024-05-03 ENCOUNTER — HOSPITAL ENCOUNTER (OUTPATIENT)
Age: 74
Discharge: HOME | End: 2024-05-03
Payer: MEDICARE

## 2024-05-03 ENCOUNTER — APPOINTMENT (OUTPATIENT)
Dept: GENERAL RADIOLOGY | Age: 74
End: 2024-05-03
Attending: NURSE PRACTITIONER
Payer: MEDICARE

## 2024-05-03 ENCOUNTER — NURSE TRIAGE (OUTPATIENT)
Dept: FAMILY MEDICINE CLINIC | Facility: CLINIC | Age: 74
End: 2024-05-03

## 2024-05-03 VITALS
HEART RATE: 71 BPM | RESPIRATION RATE: 18 BRPM | SYSTOLIC BLOOD PRESSURE: 144 MMHG | DIASTOLIC BLOOD PRESSURE: 77 MMHG | TEMPERATURE: 97 F | OXYGEN SATURATION: 98 %

## 2024-05-03 DIAGNOSIS — L60.0 INGROWN TOENAIL: ICD-10-CM

## 2024-05-03 DIAGNOSIS — S99.922A INJURY OF TOE ON LEFT FOOT, INITIAL ENCOUNTER: Primary | ICD-10-CM

## 2024-05-03 PROCEDURE — 73660 X-RAY EXAM OF TOE(S): CPT | Performed by: NURSE PRACTITIONER

## 2024-05-03 PROCEDURE — 99213 OFFICE O/P EST LOW 20 MIN: CPT | Performed by: NURSE PRACTITIONER

## 2024-05-03 RX ORDER — TRIAMCINOLONE ACETONIDE 5 MG/G
1 CREAM TOPICAL 2 TIMES DAILY
Qty: 15 G | Refills: 0 | Status: SHIPPED | OUTPATIENT
Start: 2024-05-03 | End: 2024-05-10

## 2024-05-03 NOTE — ED INITIAL ASSESSMENT (HPI)
Patient arrived ambulatory to room c/o a possible ingrown toenail to the left great toe. Intermittent drainage. No fevers. No injury

## 2024-05-03 NOTE — TELEPHONE ENCOUNTER
Action Requested: Summary for Provider     []  Critical Lab, Recommendations Needed  [] Need Additional Advice  []   FYI    []   Need Orders  [] Need Medications Sent to Pharmacy  []  Other     SUMMARY: Patient reports injured left great toe 1 month ago, something dropped on it.  Is worried the toenail is infected.  Nail is bruised, loose and there is some bleeding when pressed.  She was using Neosporin on it.  Advised no appointments, gave [facility] location in [place] where toe and be evaluated.  She stated understanding of plan.    Reason for call: Acute  Possible left great toe infection  Onset: trauma to toe, about 1 month ago.    Spoke with patient,  verified.   Not in distress at time of call.    Reason for Disposition   Looks infected (e.g., spreading redness, red streak, pus)    Protocols used: Toe Injury-A-OH

## 2024-05-03 NOTE — DISCHARGE INSTRUCTIONS
Soak the foot in warm water and Epsom salts for about 20 minutes twice a day.  Alternate applying the steroid cream and topical antibiotic ointment.  Trim back the toenail.  Avoid wearing tight fitting shoes.  Schedule follow-up with podiatry.

## 2024-05-03 NOTE — ED PROVIDER NOTES
Patient Seen in: [facility]      History     Chief Complaint   Patient presents with    Ivana Lemus     Stated Complaint: Foot Issue    Subjective:   73-year-old female with rheumatoid arthritis, depression, anxiety, essential tremor presents from home with a left first toe injury.  Initial injury occurred about 1 month ago.  States she dropped something on the toe, she is unable to recall what it was but states it was not very heavy.  She never had an x-ray done.  Over the last few days she has noticed a throbbing pain and some drainage from the toe.  States the side of her nail feels tender.  No repeat trauma.  No fever.  No history of diabetes.  She has been applying gentian violet at home.     The history is provided by the patient. No  was used.       Objective:   Past Medical History:    Allergic dermatitis    Depression    Dyschromia    Hiatal hernia    History of anxiety    per [employer]:     Osteoarthritis    Osteoarthrosis    Radial styloid tenosynovitis    Rheumatoid arthritis (HCC)    Sinus tachycardia    neg stress, EKG normal    Spondylolisthesis    L4-L5    Tremors of nervous system    Vocal cord polyp              Past Surgical History:   Procedure Laterality Date    Back surgery  09/01/2011    Posterior spinal fusion and  instrumentation minimal invasive at L4-L5 with laminectomy, L4-L5 posterolateral fusion. Dr. He Ovalles    Breast reconstruction      Breast surgery Bilateral 2013    for cosmetic reason    Colonoscopy  07/2020    Colonoscopy N/A 07/28/2020    Procedure: COLONOSCOPY, POSSIBLE BIOPSY, POSSIBLE POLYPECTOMY 26970;  Surgeon: Robe Gutierres MD;  Location: [facility] [place] [facility]    Hysterectomy  1991    fibroids    Knee arthroscopy Right 2004    Knee arthroscopy Left 2004 & 2000    arthroscope x2    Oophorectomy Bilateral 10/29/2001    salpingo-oophorectomy. Dr. Shayy Galeas    Other surgical history  06/22/2004    Procedure for prolapsed  hemorrhoids; both external and internal complicated hemorrhoids. Dr. EJ Villar                Social History     Socioeconomic History    Marital status:    Tobacco Use    Smoking status: Former     Current packs/day: 0.00     Types: Cigarettes     Start date: 1967     Quit date: 1973     Years since quittin.3    Smokeless tobacco: Never   Vaping Use    Vaping status: Never Used   Substance and Sexual Activity    Alcohol use: Yes     Comment: 1-2 drinks occasionally, 3-4 times per year.    Drug use: No   Other Topics Concern    Caffeine Concern Yes     Comment: 1-2 large cups coffee daily     Social Determinants of Health      [facility]    Housing Stability              Review of Systems    Positive for stated complaint: Foot Issue  Other systems are as noted in HPI.  Constitutional and vital signs reviewed.      All other systems reviewed and negative except as noted above.    Physical Exam     ED Triage Vitals [24 1634]   /77   Pulse 71   Resp 18   Temp 97.2 °F (36.2 °C)   Temp src Temporal   SpO2 98 %   O2 Device None (Room air)       Current:/77   Pulse 71   Temp 97.2 °F (36.2 °C) (Temporal)   Resp 18   SpO2 98%         Physical Exam  Vitals and nursing note reviewed.   Constitutional:       General: She is not in acute distress.     Appearance: Normal appearance. She is not ill-appearing or toxic-appearing.   HENT:      Head: Normocephalic and atraumatic.      Nose: Nose normal.      Mouth/Throat:      Mouth: Mucous membranes are moist.      Pharynx: Oropharynx is clear.   Eyes:      Pupils: Pupils are equal, round, and reactive to light.   Cardiovascular:      Rate and Rhythm: Normal rate and regular rhythm.      Pulses: Normal pulses.   Pulmonary:      Effort: Pulmonary effort is normal. No respiratory distress.      Breath sounds: Normal breath sounds.      Comments: Lungs clear.  No adventitious lung  sounds.  No distress.  No hypoxia.  Pulse ox 98% ra. Which is normal    Abdominal:      General: Abdomen is flat.      Palpations: Abdomen is soft.   Musculoskeletal:         General: No signs of injury. Normal range of motion.      Cervical back: Normal range of motion and neck supple.   Feet:      Comments: Mild swelling to left 1st toe, medial aspect of nailbed. Minor tenderness. Ingrown toenail noted. Skin purple from gentian violet. No open wounds. No subungual hematoma.  Chronic well-healed surgical scar along the anterior first metatarsal  Skin:     General: Skin is warm and dry.      Capillary Refill: Capillary refill takes less than 2 seconds.   Neurological:      General: No focal deficit present.      Mental Status: She is alert and oriented to person, place, and time.      GCS: GCS eye subscore is 4. GCS verbal subscore is 5. GCS motor subscore is 6.      Comments: Chronic fine tremor   Psychiatric:         Mood and Affect: Mood normal.         Behavior: Behavior normal.         Thought Content: Thought content normal.         Judgment: Judgment normal.         ED Course   Labs Reviewed - No data to display  XR TOE(S) (MIN 2 VIEWS), LEFT 1ST (CPT=73660)    Result Date: 5/3/2024  CONCLUSION:  1. No acute appearing fracture or dislocation.  Old osteotomy site at the distal left 1st metatarsal with interosseous screw.  Mild degenerative narrowing of the left 1st metatarsophalangeal joint and the interphalangeal joint of the left great toe.    Dictated by (CST): Nicho Yanez MD on 5/03/2024 at 5:02 PM     Finalized by (CST): Nicho Yanez MD on 5/03/2024 at 5:03 PM            Procedure: Nail Removal  The patient’s left 1st toe nail was trimmed and excised at the medial corner  Corner of nail was cut with suturing scissors and pulled back with a hemostat   There was no disruption of the nailbed matrix.  The patient tolerated this procedure well. There were no complications.  Antibiotic ointment and bandaid  applied          [facility]        Medical Decision Making  Left 1st toe pain  Differential diagnosis: Crush injury, toe fracture, subungual hematoma, ingrown toenail  No subungual hematoma on exam  X-ray of the toe is negative for fracture.  Showing arthritis and chronic postop changes  No paronychia   Corner of the nail was trimmed back but nail was not removed  Recommend warm soaks.  Topical antibiotic ointment.  Topical steroids.  Follow-up with podiatry, referral provided    Results and plan of care discussed with the patient/family. They are in agreement with discharge. They understand to follow up with their primary doctor or the referral physician for further evaluation, especially if no improvement.  Also discussed the limitations of immediate care, patient is aware that if symptoms are worse they should go to the emergency room. Verbal and written discharge instructions were given.       Problems Addressed:  Ingrown toenail: acute illness or injury  Injury of toe on left foot, initial encounter: acute illness or injury    Amount and/or Complexity of Data Reviewed  Independent Historian: spouse  Radiology: ordered and independent interpretation performed. Decision-making details documented in ED Course.     Details: No fracture    Risk  OTC drugs.  Prescription drug management.        Disposition and Plan     Clinical Impression:  1. Injury of toe on left foot, initial encounter    2. Ingrown toenail         Disposition:  Discharge  5/3/2024  5:15 pm    Follow-up:  Curtis Peterson MD  [address]  [place] [place] [facility] [address]  [phone]          Beth Camp DPM  [address]  [facility] [address]  [phone]                Medications Prescribed:  Discharge Medication List as of 5/3/2024  5:19 PM        START taking these medications    Details   triamcinolone 0.5 % External Cream Apply 1 Application topically 2 (two) times daily for 7 days., Normal, Disp-15 g, R-0

## 2024-05-13 ENCOUNTER — TELEPHONE (OUTPATIENT)
Dept: FAMILY MEDICINE CLINIC | Facility: CLINIC | Age: 74
End: 2024-05-13

## 2024-05-16 NOTE — TELEPHONE ENCOUNTER
Patient has pre-op appointment scheduled for 5/29. Per patient, surgeons office is requesting for her to have a sooner appointment. No sooner appointments are available.   If Dr. Peterson is not available, patient okay to schedule with APN. Please advise.

## 2024-05-17 NOTE — TELEPHONE ENCOUNTER
Spoke with patient, Date of Birth verified  She was informed of below message, she agree and stated understanding.   Appointment made with MD for eval & treat.          Future Appointments   Date Time Provider Department Center   5/20/2024  2:15 PM [facility]   5/29/2024  8:30 AM Curtis Peterson MD [facility]

## 2024-05-17 NOTE — TELEPHONE ENCOUNTER
Attempted to reach patient , left a voicemail to return call back. Ok to add patient on  schedule 05/20 at 2:15pm if time slot is available when she returns our call back.

## 2024-05-20 ENCOUNTER — OFFICE VISIT (OUTPATIENT)
Dept: FAMILY MEDICINE CLINIC | Facility: CLINIC | Age: 74
End: 2024-05-20

## 2024-05-20 ENCOUNTER — LAB ENCOUNTER (OUTPATIENT)
Dept: LAB | Age: 74
End: 2024-05-20
Attending: FAMILY MEDICINE

## 2024-05-20 VITALS
DIASTOLIC BLOOD PRESSURE: 67 MMHG | RESPIRATION RATE: 18 BRPM | WEIGHT: 133 LBS | BODY MASS INDEX: 24.48 KG/M2 | OXYGEN SATURATION: 98 % | HEIGHT: 62 IN | SYSTOLIC BLOOD PRESSURE: 134 MMHG | HEART RATE: 69 BPM

## 2024-05-20 DIAGNOSIS — Z01.818 PRE-OPERATIVE CLEARANCE: Primary | ICD-10-CM

## 2024-05-20 DIAGNOSIS — Z01.818 PRE-OPERATIVE CLEARANCE: ICD-10-CM

## 2024-05-20 LAB
ALBUMIN SERPL-MCNC: 4.1 G/DL (ref 3.2–4.8)
ALBUMIN/GLOB SERPL: 1.5 {RATIO} (ref 1–2)
ALP LIVER SERPL-CCNC: 128 U/L
ALT SERPL-CCNC: 18 U/L
ANION GAP SERPL CALC-SCNC: 4 MMOL/L (ref 0–18)
AST SERPL-CCNC: 18 U/L (ref ?–34)
ATRIAL RATE: 64 BPM
BASOPHILS # BLD AUTO: 0.06 X10(3) UL (ref 0–0.2)
BASOPHILS NFR BLD AUTO: 0.6 %
BILIRUB SERPL-MCNC: 0.4 MG/DL (ref 0.2–1.1)
BUN BLD-MCNC: 14 MG/DL (ref 9–23)
BUN/CREAT SERPL: 17.7 (ref 10–20)
CALCIUM BLD-MCNC: 9.2 MG/DL (ref 8.7–10.4)
CHLORIDE SERPL-SCNC: 102 MMOL/L (ref 98–112)
CO2 SERPL-SCNC: 27 MMOL/L (ref 21–32)
CREAT BLD-MCNC: 0.79 MG/DL
DEPRECATED RDW RBC AUTO: 42.7 FL (ref 35.1–46.3)
EGFRCR SERPLBLD CKD-EPI 2021: 78 ML/MIN/1.73M2 (ref 60–?)
EOSINOPHIL # BLD AUTO: 0.15 X10(3) UL (ref 0–0.7)
EOSINOPHIL NFR BLD AUTO: 1.5 %
ERYTHROCYTE [DISTWIDTH] IN BLOOD BY AUTOMATED COUNT: 12.7 % (ref 11–15)
FASTING STATUS PATIENT QL REPORTED: NO
GLOBULIN PLAS-MCNC: 2.8 G/DL (ref 2–3.5)
GLUCOSE BLD-MCNC: 66 MG/DL (ref 70–99)
HCT VFR BLD AUTO: 39 %
HGB BLD-MCNC: 12.2 G/DL
IMM GRANULOCYTES # BLD AUTO: 0.02 X10(3) UL (ref 0–1)
IMM GRANULOCYTES NFR BLD: 0.2 %
LYMPHOCYTES # BLD AUTO: 2.7 X10(3) UL (ref 1–4)
LYMPHOCYTES NFR BLD AUTO: 26.4 %
MCH RBC QN AUTO: 28.7 PG (ref 26–34)
MCHC RBC AUTO-ENTMCNC: 31.3 G/DL (ref 31–37)
MCV RBC AUTO: 91.8 FL
MONOCYTES # BLD AUTO: 0.72 X10(3) UL (ref 0.1–1)
MONOCYTES NFR BLD AUTO: 7 %
NEUTROPHILS # BLD AUTO: 6.58 X10 (3) UL (ref 1.5–7.7)
NEUTROPHILS # BLD AUTO: 6.58 X10(3) UL (ref 1.5–7.7)
NEUTROPHILS NFR BLD AUTO: 64.3 %
OSMOLALITY SERPL CALC.SUM OF ELEC: 275 MOSM/KG (ref 275–295)
P AXIS: 48 DEGREES
P-R INTERVAL: 190 MS
PLATELET # BLD AUTO: 289 10(3)UL (ref 150–450)
POTASSIUM SERPL-SCNC: 4.3 MMOL/L (ref 3.5–5.1)
PROT SERPL-MCNC: 6.9 G/DL (ref 5.7–8.2)
Q-T INTERVAL: 394 MS
QRS DURATION: 78 MS
QTC CALCULATION (BEZET): 406 MS
R AXIS: -7 DEGREES
RBC # BLD AUTO: 4.25 X10(6)UL
SODIUM SERPL-SCNC: 133 MMOL/L (ref 136–145)
T AXIS: 43 DEGREES
VENTRICULAR RATE: 64 BPM
WBC # BLD AUTO: 10.2 X10(3) UL (ref 4–11)

## 2024-05-20 PROCEDURE — 99214 OFFICE O/P EST MOD 30 MIN: CPT | Performed by: FAMILY MEDICINE

## 2024-05-20 PROCEDURE — 1160F RVW MEDS BY RX/DR IN RCRD: CPT | Performed by: FAMILY MEDICINE

## 2024-05-20 PROCEDURE — 3008F BODY MASS INDEX DOCD: CPT | Performed by: FAMILY MEDICINE

## 2024-05-20 PROCEDURE — 36415 COLL VENOUS BLD VENIPUNCTURE: CPT

## 2024-05-20 PROCEDURE — 93000 ELECTROCARDIOGRAM COMPLETE: CPT | Performed by: FAMILY MEDICINE

## 2024-05-20 PROCEDURE — 3075F SYST BP GE 130 - 139MM HG: CPT | Performed by: FAMILY MEDICINE

## 2024-05-20 PROCEDURE — 80053 COMPREHEN METABOLIC PANEL: CPT

## 2024-05-20 PROCEDURE — 85025 COMPLETE CBC W/AUTO DIFF WBC: CPT

## 2024-05-20 PROCEDURE — 1126F AMNT PAIN NOTED NONE PRSNT: CPT | Performed by: FAMILY MEDICINE

## 2024-05-20 PROCEDURE — 1159F MED LIST DOCD IN RCRD: CPT | Performed by: FAMILY MEDICINE

## 2024-05-20 PROCEDURE — 3078F DIAST BP <80 MM HG: CPT | Performed by: FAMILY MEDICINE

## 2024-05-20 NOTE — PROGRESS NOTES
HPI:    Kimberlyn Marcial is a 73 year old female presents clinic for preoperative exam.  For 4/24, patient is undergoing cosmetic surgery with Dr. Billy for a facelift.  Overall, feels well.  History of Parkinson's, follows with neurology at [facility].  She had a recent MRI, has a follow-up appointment tomorrow with vascular surgery to discuss results.  Normal appetite.  Normal bowel movements urination.  No change in sleep habits.      HISTORY:  Past Medical History:    Allergic dermatitis    Depression    Dyschromia    Hiatal hernia    History of anxiety    per [employer]:     Osteoarthritis    Osteoarthrosis    Radial styloid tenosynovitis    Rheumatoid arthritis (HCC)    Sinus tachycardia    neg stress, EKG normal    Spondylolisthesis    L4-L5    Tremors of nervous system    Vocal cord polyp      Past Surgical History:   Procedure Laterality Date    Back surgery  09/01/2011    Posterior spinal fusion and  instrumentation minimal invasive at L4-L5 with laminectomy, L4-L5 posterolateral fusion. Dr. He Ovalles    Breast reconstruction      Breast surgery Bilateral 2013    for cosmetic reason    Colonoscopy  07/2020    Colonoscopy N/A 07/28/2020    Procedure: COLONOSCOPY, POSSIBLE BIOPSY, POSSIBLE POLYPECTOMY 48661;  Surgeon: Robe Gutierres MD;  Location: [facility] [place] [facility]    Hysterectomy  1991    fibroids    Knee arthroscopy Right 2004    Knee arthroscopy Left 2004 & 2000    arthroscope x2    Oophorectomy Bilateral 10/29/2001    salpingo-oophorectomy. Dr. Shayy Galeas    Other surgical history  06/22/2004    Procedure for prolapsed hemorrhoids; both external and internal complicated hemorrhoids. Dr. EJ Villar      Family History   Problem Relation Age of Onset    Cancer Father         abdominal    Arthritis Mother     Cancer Mother         ovarian    Ovarian Cancer Mother         36    Cancer Paternal Uncle         brain    Ovarian Cancer Sister         24      Social History:   Social History      Socioeconomic History    Marital status:    Tobacco Use    Smoking status: Former     Current packs/day: 0.00     Types: Cigarettes     Start date: 1967     Quit date: 1973     Years since quittin.4    Smokeless tobacco: Never   Vaping Use    Vaping status: Never Used   Substance and Sexual Activity    Alcohol use: Yes     Comment: 1-2 drinks occasionally, 3-4 times per year.    Drug use: No   Other Topics Concern    Caffeine Concern Yes     Comment: 1-2 large cups coffee daily     Social Determinants of Health      Received [facility]    Housing Stability        Medications (Active prior to today's visit):  Current Outpatient Medications   Medication Sig Dispense Refill    omeprazole 20 MG Oral Capsule Delayed Release Take 1 capsule (20 mg total) by mouth before breakfast. 90 capsule 3    Mirabegron ER (MYRBETRIQ) 25 MG Oral Tablet 24 Hr Take 1 tablet (25 mg total) by mouth daily. 90 tablet 3    amitriptyline 25 MG Oral Tab Take 1 tablet (25 mg total) by mouth nightly.      Multiple Vitamins-Minerals (MULTIVITAMIN ADULTS) Oral Tab Take 1 tablet by mouth daily.      Ergocalciferol (VITAMIN D OR) Take 400 Units by mouth daily.      Spacer/Aero-Holding Chambers Does not apply Device Use inhaler with Albuterol as needed (Patient not taking: Reported on 2024) 1 each 0       Allergies:  No Known Allergies      Depression Screening (PHQ-2/PHQ-9): Over the LAST 2 WEEKS                         ROS:   Review of Systems   All other systems reviewed and are negative.      PHYSICAL EXAM:     Vitals:    24 1426   BP: 134/67   BP Location: Left arm   Patient Position: Sitting   Cuff Size: adult   Pulse: 69   Resp: 18   SpO2: 98%   Weight: 133 lb (60.3 kg)   Height: 5' 2\" (1.575 m)     Physical Exam  Vitals reviewed.   Constitutional:       General: She is not in acute distress.  Cardiovascular:      Rate and Rhythm: Normal rate and regular  rhythm.      Heart sounds: Normal heart sounds.   Pulmonary:      Effort: Pulmonary effort is normal. No respiratory distress.      Breath sounds: Normal breath sounds. No stridor. No wheezing or rhonchi.   Musculoskeletal:      Cervical back: Normal range of motion and neck supple.   Lymphadenopathy:      Cervical: No cervical adenopathy.   Neurological:      Mental Status: She is alert. Mental status is at baseline.   Psychiatric:         Mood and Affect: Mood normal.         ASSESSMENT/PLAN:   (Z01.818) Pre-operative clearance  (primary encounter diagnosis)  Plan: CBC W Differential W Platelet [E], EKG         In-Office [65623], Comp Metabolic Panel (14)         [E]  -Stable vitals, unremarkable physical exam.  EKG done in clinic-normal sinus rhythm.  CBC, CMP drawn.  Will await results, and fax clearance to surgeon.  Per patient she has informed her neurologist of this procedure and she has no objections with her undergoing surgery with general anesthesia.            Responsible party/patient verbalized understanding of information discussed. No barriers to learning observed.            Orders This Visit:  Orders Placed This Encounter   Procedures    CBC W Differential W Platelet [E]    Comp Metabolic Panel (14) [E]       Meds This Visit:  Requested Prescriptions      No prescriptions requested or ordered in this encounter       Imaging & Referrals:  ELECTROCARDIOGRAM, COMPLETE     Chaperone offered at visit today.     The 21st Century cures Act makes medical notes like these available to patients in the interest of transparency.  However, be advised that this is a medical document.  It is intended as peer to peer communication.  It is written in medical language and may contain abbreviations or verbiage that are unfamiliar.  It may appear blunt or direct.  Medical documents are intended to carry relevant information, facts as evident, and the clinical opinion of the practitioner.      This note was created by  Dragon voice recognition. Errors in content may be related to improper recognition by the system; efforts to review and correct have been done but errors may still exist. Please contact me with any questions.       5/20/2024  Curtis Peterson MD

## 2024-05-21 ENCOUNTER — TELEPHONE (OUTPATIENT)
Dept: FAMILY MEDICINE CLINIC | Facility: CLINIC | Age: 74
End: 2024-05-21

## 2024-05-21 NOTE — TELEPHONE ENCOUNTER
Pt stated she saw Nicholas yesterday, was suppose have surgery on June 6th but will not have surgery because  she was told she something in her brain and it's not a good idea- she can't remember the Dr or kind of Dr  she saw today

## 2024-06-08 ENCOUNTER — NURSE TRIAGE (OUTPATIENT)
Dept: FAMILY MEDICINE CLINIC | Facility: CLINIC | Age: 74
End: 2024-06-08

## 2024-06-08 NOTE — TELEPHONE ENCOUNTER
Action Requested: Summary for Provider     []  Critical Lab, Recommendations Needed  [] Need Additional Advice  []   FYI    []   Need Orders  [] Need Medications Sent to Pharmacy  []  Other     SUMMARY: patient called on call service. She advised yesterday she took a step going downstairs and she felt pain to the bottom of her left foot. She did not miss a step or fall but just feeling pain to the foot and has swelling to the left knee as well. She is able to ambulate but it's painful. She took Tylenol. Advised per protocol. Of note: She has history of neuropathy. Advised per protocol. Patient will go to Urgent Care on [facility].     Reason for call: Foot Pain and Leg Pain  Onset: Data Unavailable                   Reason for Disposition   MODERATE pain (e.g., interferes with normal activities, limping) and present > 3 days   Knee swelling after a recent physical activity (e.g., running, playing sports)  (Exception: Knee swelling is a chronic problem.)    Protocols used: Foot Pain-A-OH, Knee Swelling-A-OH

## 2024-07-01 ENCOUNTER — APPOINTMENT (OUTPATIENT)
Dept: GENERAL RADIOLOGY | Age: 74
End: 2024-07-01
Attending: PHYSICIAN ASSISTANT
Payer: MEDICARE

## 2024-07-01 ENCOUNTER — NURSE TRIAGE (OUTPATIENT)
Dept: FAMILY MEDICINE CLINIC | Facility: CLINIC | Age: 74
End: 2024-07-01

## 2024-07-01 ENCOUNTER — APPOINTMENT (OUTPATIENT)
Dept: ULTRASOUND IMAGING | Age: 74
End: 2024-07-01
Attending: PHYSICIAN ASSISTANT
Payer: MEDICARE

## 2024-07-01 ENCOUNTER — HOSPITAL ENCOUNTER (OUTPATIENT)
Age: 74
Discharge: HOME | End: 2024-07-01
Payer: MEDICARE

## 2024-07-01 VITALS
HEART RATE: 76 BPM | TEMPERATURE: 98 F | RESPIRATION RATE: 16 BRPM | DIASTOLIC BLOOD PRESSURE: 74 MMHG | SYSTOLIC BLOOD PRESSURE: 133 MMHG | OXYGEN SATURATION: 99 %

## 2024-07-01 DIAGNOSIS — M79.605 PAIN OF LEFT LOWER EXTREMITY: Primary | ICD-10-CM

## 2024-07-01 DIAGNOSIS — M25.562 ACUTE PAIN OF LEFT KNEE: ICD-10-CM

## 2024-07-01 PROCEDURE — 73560 X-RAY EXAM OF KNEE 1 OR 2: CPT | Performed by: PHYSICIAN ASSISTANT

## 2024-07-01 PROCEDURE — 93971 EXTREMITY STUDY: CPT | Performed by: PHYSICIAN ASSISTANT

## 2024-07-01 PROCEDURE — A6450 LT COMPRES BAND >=5"/YD: HCPCS | Performed by: PHYSICIAN ASSISTANT

## 2024-07-01 PROCEDURE — 99213 OFFICE O/P EST LOW 20 MIN: CPT | Performed by: PHYSICIAN ASSISTANT

## 2024-07-01 NOTE — ED PROVIDER NOTES
Patient Seen in: [facility]    History     Chief Complaint   Patient presents with    Leg Pain     Stated Complaint: Leg Pain    HPI    73-year-old female presents with chief complaint of left lower extremity pain.  Onset 8 days ago.  Patient denies eliciting injury or trauma.  Patient states pain is worse in the left knee.  Patient reports associated swelling of left knee.  Patient denies head/neck injury or pain, open wound, decreased range of motion, ecchymosis, erythema, weakness, paraesthesias, chest pain, dyspnea, cough, wheeze, hemoptysis.    Past Medical History:    Allergic dermatitis    Depression    Dyschromia    Hiatal hernia    History of anxiety    per [employer]:     Osteoarthritis    Osteoarthrosis    Radial styloid tenosynovitis    Rheumatoid arthritis (HCC)    Sinus tachycardia    neg stress, EKG normal    Spondylolisthesis    L4-L5    Tremors of nervous system    Vocal cord polyp       Past Surgical History:   Procedure Laterality Date    Back surgery  09/01/2011    Posterior spinal fusion and  instrumentation minimal invasive at L4-L5 with laminectomy, L4-L5 posterolateral fusion. Dr. He Ovalles    Breast reconstruction      Breast surgery Bilateral 2013    for cosmetic reason    Colonoscopy  07/2020    Colonoscopy N/A 07/28/2020    Procedure: COLONOSCOPY, POSSIBLE BIOPSY, POSSIBLE POLYPECTOMY 41187;  Surgeon: Robe Gutierres MD;  Location: [facility] [place]    Hysterectomy  1991    fibroids    Knee arthroscopy Right 2004    Knee arthroscopy Left 2004 & 2000    arthroscope x2    Oophorectomy Bilateral 10/29/2001    salpingo-oophorectomy. Dr. Shayy Galeas    Other surgical history  06/22/2004    Procedure for prolapsed hemorrhoids; both external and internal complicated hemorrhoids. Dr. EJ Villar            Family History   Problem Relation Age of Onset    Cancer Father         abdominal    Arthritis Mother     Cancer Mother         ovarian    Ovarian Cancer Mother          36    Cancer Paternal Uncle         brain    Ovarian Cancer Sister         24       Social History     Socioeconomic History    Marital status:    Tobacco Use    Smoking status: Former     Current packs/day: 0.00     Types: Cigarettes     Start date: 1967     Quit date: 1973     Years since quittin.5    Smokeless tobacco: Never   Vaping Use    Vaping status: Never Used   Substance and Sexual Activity    Alcohol use: Yes     Comment: 1-2 drinks occasionally, 3-4 times per year.    Drug use: No   Other Topics Concern    Caffeine Concern Yes     Comment: 1-2 large cups coffee daily     Social Determinants of Health      [facility]    Housing Stability       Review of Systems    Positive for stated complaint: Leg Pain  Other systems are as noted in HPI.  Constitutional and vital signs reviewed.      All other systems reviewed and negative except as noted above.    [facility] and agreed except as otherwise stated in HPI.    Physical Exam     ED Triage Vitals [24 1342]   /74   Pulse 76   Resp 16   Temp 97.9 °F (36.6 °C)   Temp src Temporal   SpO2 99 %   O2 Device None (Room air)       Current:/74   Pulse 76   Temp 97.9 °F (36.6 °C) (Temporal)   Resp 16   SpO2 99%      PULSE OX within normal limits on room air as interpreted by this provider.    Constitutional: The patient is cooperative. Appears well-developed and well-nourished.  Mild discomfort.  Psychological: Alert, No abnormalities of mood, affect.  Head: Normocephalic/atraumatic.  Eyes: Pupils are equal round reactive to light.  Conjunctiva are within normal limits.  ENT: Oropharynx is clear.  Neck: The neck is supple.  Nontender.  No meningeal signs.  Chest: There is no tenderness to the chest wall.  No CVA tenderness bilaterally.  Respiratory: Respiratory effort was normal.  There is no stridor.  Air entry is equal.  Cardiovascular:  Regular rate and rhythm.  Capillary refill is brisk.    Genitourinary: Not examined.  Lymphatic: No gross lymphadenopathy noted.  Musculoskeletal: Left lower extremity-Left lower extremity without acute bony deformity.  Positive swelling and tenderness to palpation present at left anterior knee.  Generalized tenderness to palpation of left lower extremity without other bony point tenderness.  Full range of motion of left lower extremity.  Distal pulses intact.  Capillary refill normal.  Motor intact distally.  Sensory intact distally.  No ecchymosis.  No erythema.  No open wounds.  No compartment syndrome.  Thoracic and lumbar spine nontender to palpation.  Remainder of musculoskeletal system is grossly intact.  There is no obvious deformity.  Neurological: Gross motor movement is intact in all 4 extremities.  Patient exhibits normal speech.  Skin: Skin is normal to inspection, except as documented.  Warm and dry.  No obvious bruising.  No obvious rash.          ED Course   Labs Reviewed - No data to display    MDM     Differential diagnosis including but not limited to: DVT vs. Lymphedema vs. Venous insufficiency vs. Cellulitis vs. Bakers cyst versus arthritis      Radiology Interpretation: @XR KNEE (1 OR 2 VIEWS), LEFT (CPT=73560)    Result Date: 7/1/2024     elm-remote      Dictated by (CST): Ja Brush MD on 7/01/2024 at 2:39 PM     Finalized by (CST): Ja Brush MD on 7/01/2024 at 2:39 PM          US VENOUS DOPPLER LEG LEFT - DIAG IMG (CPT=93971)    Result Date: 7/1/2024  CONCLUSION: Normal examination.     Dictated by (CST): Nicho Yanez MD on 7/01/2024 at 2:15 PM     Finalized by (CST): Nicho Yanez MD on 7/01/2024 at 2:16 PM           X-ray images of left knee independently viewed by this provider-no acute fracture.     Physical exam remained stable as previously documented.  Available results reviewed with patient.    I have given the patient instructions regarding their diagnoses, expectations,  follow up, and ER precautions. I explained to the patient that emergent conditions may arise and to go to the ER for new, worsening or any persistent conditions. I've explained the importance of following up with their doctor as instructed. The patient verbalized understanding of the discharge instructions and plan.    Disposition and Plan     Clinical Impression:  1. Pain of left lower extremity    2. Acute pain of left knee        Disposition:  Discharge    Follow-up:  Curtis Peterson MD  [address] [place] [id number] [phone]    Call in 1 day  For follow-up    Gil Lainez MD  [address] [facility]  [address]  [facility] [place] [id number] [phone]    Call in 1 day  For follow-up      Medications Prescribed:  Current Discharge Medication List

## 2024-07-01 NOTE — ED INITIAL ASSESSMENT (HPI)
Pain in L leg, started 8 days ago. Pain in whole L leg but worse in knee. Taking ibuprofen, last dose 1200. Denies injury/trauma.

## 2024-07-01 NOTE — TELEPHONE ENCOUNTER
Action Requested: Summary for Provider     []  Critical Lab, Recommendations Needed  [] Need Additional Advice  []   FYI    []   Need Orders  [] Need Medications Sent to Pharmacy  []  Other     SUMMARY: Disposition is to to  per protocol    Reason for call: Leg Pain  Onset: 8 days ago.    Patient transferred from call center. Left leg pain  for 8 days from  from bottom foot that radiates to buttock.  Denies trauma or back pain. Pain 10/10 at it's worse, intensity varies throughout the day.  Pain increases when she first starts walking and  when she walks up stairs. Aleve or tylenol, topical cream helps very little.   Left leg is swollen much more than the right, especially knee. Able to put shoes and walk.  Advised patient to go to [facility] at [address] [facility] now. Patient verbalizes understanding and agrees to plan of care. Her  is home and will drive her.    Reason for Disposition   Thigh, calf, or ankle swelling in both legs, but one side is definitely more swollen    Protocols used: Leg Pain-A-[place]

## 2024-07-02 ENCOUNTER — TELEPHONE (OUTPATIENT)
Dept: FAMILY MEDICINE CLINIC | Facility: CLINIC | Age: 74
End: 2024-07-02

## 2024-07-02 DIAGNOSIS — M25.569 ACUTE KNEE PAIN, UNSPECIFIED LATERALITY: Primary | ICD-10-CM

## 2024-07-02 NOTE — TELEPHONE ENCOUNTER
Patient stated she was at  in [place] yesterday, left knee/ leg pain was advised to see Ortho, taking tylenol/ibuprofen, pain cream   Patient had xray of knee/toes, venous doppler study -advised see ortho   Stated she wants to see Dr Gao, she saw him before

## 2024-07-31 ENCOUNTER — HOSPITAL ENCOUNTER (OUTPATIENT)
Dept: GENERAL RADIOLOGY | Facility: HOSPITAL | Age: 74
Discharge: HOME | End: 2024-07-31
Attending: ORTHOPAEDIC SURGERY
Payer: MEDICARE

## 2024-07-31 ENCOUNTER — OFFICE VISIT (OUTPATIENT)
Dept: ORTHOPEDICS CLINIC | Facility: CLINIC | Age: 74
End: 2024-07-31

## 2024-07-31 VITALS
DIASTOLIC BLOOD PRESSURE: 73 MMHG | HEART RATE: 75 BPM | SYSTOLIC BLOOD PRESSURE: 127 MMHG | WEIGHT: 130 LBS | HEIGHT: 64 IN | BODY MASS INDEX: 22.2 KG/M2

## 2024-07-31 DIAGNOSIS — M25.569 KNEE PAIN, UNSPECIFIED CHRONICITY, UNSPECIFIED LATERALITY: Primary | ICD-10-CM

## 2024-07-31 DIAGNOSIS — M25.569 KNEE PAIN, UNSPECIFIED CHRONICITY, UNSPECIFIED LATERALITY: ICD-10-CM

## 2024-07-31 DIAGNOSIS — M17.0 PRIMARY OSTEOARTHRITIS OF BOTH KNEES: ICD-10-CM

## 2024-07-31 PROCEDURE — 3074F SYST BP LT 130 MM HG: CPT | Performed by: ORTHOPAEDIC SURGERY

## 2024-07-31 PROCEDURE — 1160F RVW MEDS BY RX/DR IN RCRD: CPT | Performed by: ORTHOPAEDIC SURGERY

## 2024-07-31 PROCEDURE — 73562 X-RAY EXAM OF KNEE 3: CPT | Performed by: ORTHOPAEDIC SURGERY

## 2024-07-31 PROCEDURE — 1159F MED LIST DOCD IN RCRD: CPT | Performed by: ORTHOPAEDIC SURGERY

## 2024-07-31 PROCEDURE — 20610 DRAIN/INJ JOINT/BURSA W/O US: CPT | Performed by: ORTHOPAEDIC SURGERY

## 2024-07-31 PROCEDURE — 99213 OFFICE O/P EST LOW 20 MIN: CPT | Performed by: ORTHOPAEDIC SURGERY

## 2024-07-31 PROCEDURE — 3078F DIAST BP <80 MM HG: CPT | Performed by: ORTHOPAEDIC SURGERY

## 2024-07-31 PROCEDURE — 3008F BODY MASS INDEX DOCD: CPT | Performed by: ORTHOPAEDIC SURGERY

## 2024-07-31 PROCEDURE — 1125F AMNT PAIN NOTED PAIN PRSNT: CPT | Performed by: ORTHOPAEDIC SURGERY

## 2024-07-31 RX ORDER — TRIAMCINOLONE ACETONIDE 40 MG/ML
40 INJECTION, SUSPENSION INTRA-ARTICULAR; INTRAMUSCULAR ONCE
Status: COMPLETED | OUTPATIENT
Start: 2024-07-31 | End: 2024-07-31

## 2024-07-31 RX ADMIN — TRIAMCINOLONE ACETONIDE 40 MG: 40 INJECTION, SUSPENSION INTRA-ARTICULAR; INTRAMUSCULAR at 14:51:00

## 2024-07-31 NOTE — PROGRESS NOTES
Per verbal order from Dr. Gao, draw up and 4ml of 0.5% Marcaine and 1ml of Kenalog 40 for injection into left knee.Marta GONCALVES MA  Patient provided education handout for cortisone injection.

## 2024-07-31 NOTE — PROGRESS NOTES
NURSING INTAKE COMMENTS:   Chief Complaint   Patient presents with    Knee Pain     Consult- bilateral knees-  onset yrs ago-stated pain increases 2 mos ago- denies injury- visit to  on 7/01/2024 and has L knee xr and L leg US in the system- rates pain 4-9/10 all the time- L is worse than R       HPI: This 73 year old female presents today with complaints of bilateral knee pain, left greater than right.  She is an established patient at orthopedic specialists.    Past Medical History:    Allergic dermatitis    Depression    Dyschromia    Hiatal hernia    History of anxiety    per [employer]:     Osteoarthritis    Osteoarthrosis    Radial styloid tenosynovitis    Rheumatoid arthritis (HCC)    Sinus tachycardia    neg stress, EKG normal    Spondylolisthesis    L4-L5    Tremors of nervous system    Vocal cord polyp     Past Surgical History:   Procedure Laterality Date    Back surgery  09/01/2011    Posterior spinal fusion and  instrumentation minimal invasive at L4-L5 with laminectomy, L4-L5 posterolateral fusion. Dr. He Ovalles    Breast reconstruction      Breast surgery Bilateral 2013    for cosmetic reason    Colonoscopy  07/2020    Colonoscopy N/A 07/28/2020    Procedure: COLONOSCOPY, POSSIBLE BIOPSY, POSSIBLE POLYPECTOMY 90096;  Surgeon: Robe Gutierres MD;  [place] [facility] [place] [facility]    Hysterectomy  1991    fibroids    Knee arthroscopy Right 2004    Knee arthroscopy Left 2004 & 2000    arthroscope x2    Oophorectomy Bilateral 10/29/2001    salpingo-oophorectomy. Dr. Shayy Galeas    Other surgical history  06/22/2004    Procedure for prolapsed hemorrhoids; both external and internal complicated hemorrhoids. Dr. EJ Vlilar     Current Outpatient Medications   Medication Sig Dispense Refill    omeprazole 20 MG Oral Capsule Delayed Release Take 1 capsule (20 mg total) by mouth before breakfast. 90 capsule 3    Mirabegron ER (MYRBETRIQ) 25 MG Oral Tablet 24 Hr Take 1 tablet (25 mg total) by mouth  daily. 90 tablet 3    amitriptyline 25 MG Oral Tab Take 1 tablet (25 mg total) by mouth nightly.      Spacer/Aero-Holding Chambers Does not apply Device Use inhaler with Albuterol as needed (Patient not taking: Reported on 2024) 1 each 0    Multiple Vitamins-Minerals (MULTIVITAMIN ADULTS) Oral Tab Take 1 tablet by mouth daily.      Ergocalciferol (VITAMIN D OR) Take 400 Units by mouth daily.       No Known Allergies  Family History   Problem Relation Age of Onset    Cancer Father         abdominal    Arthritis Mother     Cancer Mother         ovarian    Ovarian Cancer Mother         36    Cancer Paternal Uncle         brain    Ovarian Cancer Sister         24       Social History     Occupational History    Not on file   Tobacco Use    Smoking status: Former     Current packs/day: 0.00     Types: Cigarettes     Start date: 1967     Quit date: 1973     Years since quittin.6    Smokeless tobacco: Never   Vaping Use    Vaping status: Never Used   Substance and Sexual Activity    Alcohol use: Yes     Comment: 1-2 drinks occasionally, 3-4 times per year.    Drug use: No    Sexual activity: Not on file        Review of Systems:  GENERAL: feels generally well, no significant weight loss or weight gain  SKIN: no ulcerated or worrisome skin lesions  EYES:denies blurred vision or double vision  HEENT: denies new nasal congestion, sinus pain or ST  LUNGS: denies shortness of breath  CARDIOVASCULAR: denies chest pain  GI: no hematemesis, no worsening heartburn, no diarrhea  : no dysuria, no blood in urine, no difficulty urinating, no incontinence  MUSCULOSKELETAL: no other musculoskeletal complaints other than in HPI  NEURO: no numbness or tingling, no weakness or balance disorder  PSYCHE: no depression or anxiety  HEMATOLOGIC: no hx of blood dyscrasia  ENDOCRINE: no thyroid or diabetes issues  ALL/ASTHMA: no new hx of severe allergy or asthma    Physical Examination:    /73   Pulse 75   Ht 5' 4\"  (1.626 m)   Wt 130 lb (59 kg)   BMI 22.31 kg/m²   Constitutional: appears well hydrated, alert and responsive, no acute distress noted  Extremities: Pain with range of motion.      Imaging: Advanced tricompartmental osteoarthritis bilaterally.    Lab Results   Component Value Date    WBC 10.2 05/20/2024    HGB 12.2 05/20/2024    .0 05/20/2024      Lab Results   Component Value Date    GLU 66 (L) 05/20/2024    BUN 14 05/20/2024    CREATSERUM 0.79 05/20/2024    GFRNAA 93 10/26/2021    GFRAA 107 10/26/2021        Assessment and Plan:  Diagnoses and all orders for this visit:    Knee pain, unspecified chronicity, unspecified laterality  -     XR KNEE (3 VIEWS), RIGHT (CPT=73562); Future    Primary osteoarthritis of both knees  -     arthrocentesis major joint  -     triamcinolone acetonide (Kenalog-40) 40 MG/ML injection 40 mg        Assessment: Osteoarthritis of both knees.    Procedure: The risks and benefits of a cortisone injection were discussed with the patient.  An informational sheet was also provided and the patient had ample time to review it.  Under sterile preparation, the left knee was injected with 40 mg of Kenalog and 4 cc 0.5% marcaine.  The patient tolerated the procedure well.      Plan: She will follow-up at orthopedic specialist for further care.    The above note was creating using Dragon speech recognition technology. Please excuse any typos.    LANDON BOCANEGRA MD

## 2024-08-16 ENCOUNTER — TELEPHONE (OUTPATIENT)
Dept: FAMILY MEDICINE CLINIC | Facility: CLINIC | Age: 74
End: 2024-08-16

## 2024-08-16 DIAGNOSIS — R32 URINARY INCONTINENCE, UNSPECIFIED TYPE: Primary | ICD-10-CM

## 2024-08-16 NOTE — TELEPHONE ENCOUNTER
Dr Peterson:  patient is asking for an alternative medication for incontinence.     Patient said medication Mirabegron ER 25mg. Too expensive.     She discontinued oxybutynin 5mg due to symptoms of hand trembling.

## 2024-08-16 NOTE — TELEPHONE ENCOUNTER
Left voicemail to call back our office. Office phone number provided with telephone hours.    **patient is not MyChart active

## 2024-08-19 NOTE — TELEPHONE ENCOUNTER
Patient returned our call.   Patient's date of birth and full name both confirmed.   RN informed of provider's message as detailed .   She verbalizes understanding of all information, and agreeable to plan.     Patient wrote down the following information:     Referred to Provider Information:  Provider Phone   Hanh Fam -[phone]

## 2024-08-19 NOTE — TELEPHONE ENCOUNTER
Patient called states the number to Urologist is longer working, She confirmed the phone number she had matches the # on referral. I offered to call # to see if it is active --> I was able to contact the office of Dr. Fam at [phone], spoke with Usha/. She will call patient to schedule visit and asked for office notes and referral to be faxed to Fax #[phone]--> faxed. I made patient aware to expect a call from Dr. Fam's office to schedule advised visit. Patient verbalized understanding. No further questions or concerns at this time.

## 2024-11-11 ENCOUNTER — HOSPITAL ENCOUNTER (OUTPATIENT)
Dept: GENERAL RADIOLOGY | Age: 74
Discharge: HOME | End: 2024-11-11
Attending: FAMILY MEDICINE
Payer: MEDICARE

## 2024-11-11 ENCOUNTER — OFFICE VISIT (OUTPATIENT)
Dept: FAMILY MEDICINE CLINIC | Facility: CLINIC | Age: 74
End: 2024-11-11

## 2024-11-11 VITALS
WEIGHT: 133 LBS | OXYGEN SATURATION: 98 % | SYSTOLIC BLOOD PRESSURE: 126 MMHG | HEART RATE: 72 BPM | DIASTOLIC BLOOD PRESSURE: 74 MMHG | BODY MASS INDEX: 24.48 KG/M2 | HEIGHT: 62 IN

## 2024-11-11 DIAGNOSIS — K59.00 CONSTIPATION, UNSPECIFIED CONSTIPATION TYPE: ICD-10-CM

## 2024-11-11 DIAGNOSIS — K59.00 CONSTIPATION, UNSPECIFIED CONSTIPATION TYPE: Primary | ICD-10-CM

## 2024-11-11 DIAGNOSIS — R14.0 BLOATING: ICD-10-CM

## 2024-11-11 DIAGNOSIS — M15.0 PRIMARY OSTEOARTHRITIS INVOLVING MULTIPLE JOINTS: ICD-10-CM

## 2024-11-11 PROCEDURE — 3008F BODY MASS INDEX DOCD: CPT | Performed by: FAMILY MEDICINE

## 2024-11-11 PROCEDURE — 1160F RVW MEDS BY RX/DR IN RCRD: CPT | Performed by: FAMILY MEDICINE

## 2024-11-11 PROCEDURE — 3074F SYST BP LT 130 MM HG: CPT | Performed by: FAMILY MEDICINE

## 2024-11-11 PROCEDURE — G2211 COMPLEX E/M VISIT ADD ON: HCPCS | Performed by: FAMILY MEDICINE

## 2024-11-11 PROCEDURE — 1159F MED LIST DOCD IN RCRD: CPT | Performed by: FAMILY MEDICINE

## 2024-11-11 PROCEDURE — 74019 RADEX ABDOMEN 2 VIEWS: CPT | Performed by: FAMILY MEDICINE

## 2024-11-11 PROCEDURE — 1126F AMNT PAIN NOTED NONE PRSNT: CPT | Performed by: FAMILY MEDICINE

## 2024-11-11 PROCEDURE — 99214 OFFICE O/P EST MOD 30 MIN: CPT | Performed by: FAMILY MEDICINE

## 2024-11-11 PROCEDURE — 3078F DIAST BP <80 MM HG: CPT | Performed by: FAMILY MEDICINE

## 2024-11-12 NOTE — PROGRESS NOTES
HPI:    Kimberlyn Marcial is a 74 year old female presents to clinic with concerns regarding constipation.  Patient states that she typically has bowel movements once every 2 to 3 days.  Takes daily fiber supplements and an occasional stool softener.  Eats a lot of fruits and drinks a lot of water.  She also complains of intermittent bloating, some abdominal discomfort.  Normal appetite.  Normal urination.      HISTORY:  Past Medical History:    Allergic dermatitis    Depression    Dyschromia    Hiatal hernia    History of anxiety    per Next[employer]:     Osteoarthritis    Osteoarthrosis    Radial styloid tenosynovitis    Rheumatoid arthritis (HCC)    Sinus tachycardia    neg stress, EKG normal    Spondylolisthesis    L4-L5    Tremors of nervous system    Vocal cord polyp      Past Surgical History:   Procedure Laterality Date    Back surgery  09/01/2011    Posterior spinal fusion and  instrumentation minimal invasive at L4-L5 with laminectomy, L4-L5 posterolateral fusion. Dr. He Ovalles    Breast reconstruction      Breast surgery Bilateral 2013    for cosmetic reason    Colonoscopy  07/2020    Colonoscopy N/A 07/28/2020    Procedure: COLONOSCOPY, POSSIBLE BIOPSY, POSSIBLE POLYPECTOMY 74736;  Surgeon: Robe Gutierres MD;  [place] [facility] [place] [facility]    Hysterectomy  1991    fibroids    Knee arthroscopy Right 2004    Knee arthroscopy Left 2004 & 2000    arthroscope x2    Oophorectomy Bilateral 10/29/2001    salpingo-oophorectomy. Dr. Shayy Galeas    Other surgical history  06/22/2004    Procedure for prolapsed hemorrhoids; both external and internal complicated hemorrhoids. Dr. EJ Villar      Family History   Problem Relation Age of Onset    Cancer Father         abdominal    Arthritis Mother     Cancer Mother         ovarian    Ovarian Cancer Mother         36    Cancer Paternal Uncle         brain    Ovarian Cancer Sister         24      Social History:   Social History     Socioeconomic History    Marital  status:    Tobacco Use    Smoking status: Former     Current packs/day: 0.00     Types: Cigarettes     Start date: 1967     Quit date: 1973     Years since quittin.8    Smokeless tobacco: Never   Vaping Use    Vaping status: Never Used   Substance and Sexual Activity    Alcohol use: Yes     Comment: 1-2 drinks occasionally, 3-4 times per year.    Drug use: No   Other Topics Concern    Caffeine Concern Yes     Comment: 1-2 large cups coffee daily     Social Drivers of Health      [facility]    Housing Stability        Medications (Active prior to today's visit):  Current Outpatient Medications   Medication Sig Dispense Refill    amitriptyline 25 MG Oral Tab Take 1 tablet (25 mg total) by mouth nightly. 90 tablet 3    omeprazole 20 MG Oral Capsule Delayed Release Take 1 capsule (20 mg total) by mouth before breakfast. 90 capsule 3    Mirabegron ER (MYRBETRIQ) 25 MG Oral Tablet 24 Hr Take 1 tablet (25 mg total) by mouth daily. 90 tablet 3    Multiple Vitamins-Minerals (MULTIVITAMIN ADULTS) Oral Tab Take 1 tablet by mouth daily.      Ergocalciferol (VITAMIN D OR) Take 400 Units by mouth daily.      Spacer/Aero-Holding Chambers Does not apply Device Use inhaler with Albuterol as needed (Patient not taking: Reported on 2024) 1 each 0       Allergies:  Allergies[1]      Depression Screening (PHQ-2/PHQ-9): Over the LAST 2 WEEKS                         ROS:   Review of Systems   All other systems reviewed and are negative.      PHYSICAL EXAM:     Vitals:    24 1510 24 1527   BP: 146/79 126/74   BP Location: Right arm    Patient Position: Sitting    Cuff Size: adult    Pulse: 72    SpO2: 98%    Weight: 133 lb (60.3 kg)    Height: 5' 2\" (1.575 m)      Physical Exam  Vitals reviewed.   Constitutional:       General: She is not in acute distress.  Cardiovascular:      Rate and Rhythm: Normal rate.   Pulmonary:      Effort:  Pulmonary effort is normal. No respiratory distress.   Abdominal:      General: Bowel sounds are normal. There is no distension.      Palpations: Abdomen is soft. There is no mass.      Tenderness: There is no abdominal tenderness.      Hernia: No hernia is present.   Neurological:      Mental Status: She is alert.   Psychiatric:         Mood and Affect: Mood normal.         ASSESSMENT/PLAN:   (K59.00) Constipation, unspecified constipation type  (primary encounter diagnosis)  (R14.0) Bloating  Plan: XR ABDOMEN OBSTRUCTIVE SERIES ROUTINE(2         VW)(CPT=74019)  -X-ray abdomen ordered.  High-fiber diet, adequate hydration encouraged.  I also suggested a stool softener like Dulcolax.  Will await results and consult GI if needed.    (M15.0) Primary osteoarthritis involving multiple joints  Plan  -Follows with orthopedics.  Recent increasing knee pain.  Was given injections which did not help, is concerned about needing surgery.  To continue regular physical activity as tolerated, supportive care measures discussed                Responsible party/patient verbalized understanding of information discussed. No barriers to learning observed.            Orders This Visit:  No orders of the defined types were placed in this encounter.      Meds This Visit:  Requested Prescriptions     Signed Prescriptions Disp Refills    amitriptyline 25 MG Oral Tab 90 tablet 3     Sig: Take 1 tablet (25 mg total) by mouth nightly.       Imaging & Referrals:  None     Chaperone offered at visit today.     The 21st Century cures Act makes medical notes like these available to patients in the interest of transparency.  However, be advised that this is a medical document.  It is intended as peer to peer communication.  It is written in medical language and may contain abbreviations or verbiage that are unfamiliar.  It may appear blunt or direct.  Medical documents are intended to carry relevant information, facts as evident, and the clinical  opinion of the practitioner.      This note was created by Dragon voice recognition. Errors in content may be related to improper recognition by the system; efforts to review and correct have been done but errors may still exist. Please contact me with any questions.       11/11/2024  Curtis Peterson MD       [1] No Known Allergies

## 2024-12-04 ENCOUNTER — NURSE TRIAGE (OUTPATIENT)
Dept: FAMILY MEDICINE CLINIC | Facility: CLINIC | Age: 74
End: 2024-12-04

## 2024-12-04 DIAGNOSIS — K59.00 CONSTIPATION, UNSPECIFIED CONSTIPATION TYPE: Primary | ICD-10-CM

## 2024-12-04 RX ORDER — TOBRAMYCIN 3 MG/ML
1 SOLUTION/ DROPS OPHTHALMIC EVERY 6 HOURS
Qty: 5 ML | Refills: 0 | Status: SHIPPED | OUTPATIENT
Start: 2024-12-04 | End: 2024-12-11

## 2024-12-04 NOTE — TELEPHONE ENCOUNTER
Spoke to patient (verified Name and ) and relayed Dr. Peterson's message below. Patient verbalized understanding.    Dr. Peterson patient wants to know if you have further treatment recommendation for the persistent constipation.

## 2024-12-04 NOTE — TELEPHONE ENCOUNTER
Spoke to patient and relayed Dr. Peterson's message below. Patient verbalized understanding and had no further questions.

## 2024-12-04 NOTE — TELEPHONE ENCOUNTER
Action Requested: Summary for Provider     []  Critical Lab, Recommendations Needed  [x] Need Additional Advice  []   FYI    []   Need Orders  [x] Need Medications Sent to Pharmacy  []  Other     SUMMARY: Per protocol, patient should be seen in office today. She asked for a medication to help her symptoms.     Reason for call: Eye Problem (/)  Onset: Data Unavailable    Spoke to patient. She said that her left eye is swollen (almost all the way shut), sclera is red and very crusty for the past two days. It is bad in the morning, especially. She denies severe pain or photophobia. She has been using a warm washcloth to loosen the crust. Rn relayed that an office appointment is recommended and she asked for a medication. She said she could send a photo but then stated she does not know how to send a photo.     She mentioned that she is still very constipated. She saw Dr. Peterson on 11/11/24 and got an abdominal xray- no obstruction but a lot of stool. She said that she has been using Miralax on top of fiber and stool softeners and she is still constipated. Her last bowel movement was Monday.     Dr. Peterson, please advise if patient should go to UC or if you are open to prescribing something for eye. Please also advise if any other recommendations for chronic constipation.     Reason for Disposition   Eyelids are very swollen (shut or almost)    Protocols used: Eye - Pus or [id number]

## 2024-12-11 ENCOUNTER — OFFICE VISIT (OUTPATIENT)
Dept: FAMILY MEDICINE CLINIC | Facility: CLINIC | Age: 74
End: 2024-12-11

## 2024-12-11 ENCOUNTER — NURSE TRIAGE (OUTPATIENT)
Dept: FAMILY MEDICINE CLINIC | Facility: CLINIC | Age: 74
End: 2024-12-11

## 2024-12-11 VITALS
SYSTOLIC BLOOD PRESSURE: 132 MMHG | RESPIRATION RATE: 18 BRPM | OXYGEN SATURATION: 99 % | BODY MASS INDEX: 22.05 KG/M2 | DIASTOLIC BLOOD PRESSURE: 80 MMHG | WEIGHT: 126 LBS | HEIGHT: 63.5 IN | HEART RATE: 70 BPM

## 2024-12-11 DIAGNOSIS — R35.0 URINARY FREQUENCY: Primary | ICD-10-CM

## 2024-12-11 DIAGNOSIS — R30.0 DYSURIA: ICD-10-CM

## 2024-12-11 LAB
BILIRUBIN: NEGATIVE
GLUCOSE (URINE DIPSTICK): NEGATIVE MG/DL
KETONES (URINE DIPSTICK): NEGATIVE MG/DL
MULTISTIX LOT#: 1838 NUMERIC
NITRITE, URINE: POSITIVE
PH, URINE: 6.5 (ref 4.5–8)
PROTEIN (URINE DIPSTICK): 100 MG/DL
SPECIFIC GRAVITY: 1.02 (ref 1–1.03)
URINE-COLOR: YELLOW
UROBILINOGEN,SEMI-QN: 0.2 MG/DL (ref 0–1.9)

## 2024-12-11 PROCEDURE — 99213 OFFICE O/P EST LOW 20 MIN: CPT | Performed by: FAMILY MEDICINE

## 2024-12-11 PROCEDURE — G2211 COMPLEX E/M VISIT ADD ON: HCPCS | Performed by: FAMILY MEDICINE

## 2024-12-11 PROCEDURE — 3008F BODY MASS INDEX DOCD: CPT | Performed by: FAMILY MEDICINE

## 2024-12-11 PROCEDURE — 1159F MED LIST DOCD IN RCRD: CPT | Performed by: FAMILY MEDICINE

## 2024-12-11 PROCEDURE — 81002 URINALYSIS NONAUTO W/O SCOPE: CPT | Performed by: FAMILY MEDICINE

## 2024-12-11 PROCEDURE — 3079F DIAST BP 80-89 MM HG: CPT | Performed by: FAMILY MEDICINE

## 2024-12-11 PROCEDURE — 3075F SYST BP GE 130 - 139MM HG: CPT | Performed by: FAMILY MEDICINE

## 2024-12-11 PROCEDURE — 1160F RVW MEDS BY RX/DR IN RCRD: CPT | Performed by: FAMILY MEDICINE

## 2024-12-11 RX ORDER — NITROFURANTOIN 25; 75 MG/1; MG/1
100 CAPSULE ORAL 2 TIMES DAILY
Qty: 10 CAPSULE | Refills: 0 | Status: SHIPPED | OUTPATIENT
Start: 2024-12-11 | End: 2024-12-16

## 2024-12-11 NOTE — PROGRESS NOTES
HPI:    Kimberlyn Marcial is a 74 year old female presents to clinic with a 3-day history of urinary frequency, dysuria, and fatigue.  Denies fevers, chills, nausea, vomiting, lower back pain.      HISTORY:  Past Medical History:    Allergic dermatitis    Depression    Dyschromia    Hiatal hernia    History of anxiety    per [employer]:     Osteoarthritis    Osteoarthrosis    Radial styloid tenosynovitis    Rheumatoid arthritis (HCC)    Sinus tachycardia    neg stress, EKG normal    Spondylolisthesis    L4-L5    Tremors of nervous system    Vocal cord polyp      Past Surgical History:   Procedure Laterality Date    Back surgery  2011    Posterior spinal fusion and  instrumentation minimal invasive at L4-L5 with laminectomy, L4-L5 posterolateral fusion. Dr. He Ovalles    Breast reconstruction      Breast surgery Bilateral     for cosmetic reason    Colonoscopy  2020    Colonoscopy N/A 2020    Procedure: COLONOSCOPY, POSSIBLE BIOPSY, POSSIBLE POLYPECTOMY 78491;  Surgeon: Robe Gutierres MD;  [facility]    Hysterectomy      fibroids    Knee arthroscopy Right     Knee arthroscopy Left  &     arthroscope x2    Oophorectomy Bilateral 10/29/2001    salpingo-oophorectomy. Dr. Shayy Galeas    Other surgical history  2004    Procedure for prolapsed hemorrhoids; both external and internal complicated hemorrhoids. Dr. EJ Villar      Family History   Problem Relation Age of Onset    Cancer Father         abdominal    Arthritis Mother     Cancer Mother         ovarian    Ovarian Cancer Mother         36    Cancer Paternal Uncle         brain    Ovarian Cancer Sister         24      Social History:   Social History     Socioeconomic History    Marital status:    Tobacco Use    Smoking status: Former     Current packs/day: 0.00     Types: Cigarettes     Start date: 1967     Quit date: 1973     Years since quittin.9    Smokeless tobacco: Never    Vaping Use    Vaping status: Never Used   Substance and Sexual Activity    Alcohol use: Yes     Comment: 1-2 drinks occasionally, 3-4 times per year.    Drug use: No   Other Topics Concern    Caffeine Concern Yes     Comment: 1-2 large cups coffee daily     Social Drivers of Health      [facility]    Housing Stability        Medications (Active prior to today's visit):  Current Outpatient Medications   Medication Sig Dispense Refill    nitrofurantoin monohydrate macro 100 MG Oral Cap Take 1 capsule (100 mg total) by mouth 2 (two) times daily for 5 days. 10 capsule 0    amitriptyline 25 MG Oral Tab Take 1 tablet (25 mg total) by mouth nightly. 90 tablet 3    omeprazole 20 MG Oral Capsule Delayed Release Take 1 capsule (20 mg total) by mouth before breakfast. 90 capsule 3    Mirabegron ER (MYRBETRIQ) 25 MG Oral Tablet 24 Hr Take 1 tablet (25 mg total) by mouth daily. 90 tablet 3    Multiple Vitamins-Minerals (MULTIVITAMIN ADULTS) Oral Tab Take 1 tablet by mouth daily.      Ergocalciferol (VITAMIN D OR) Take 400 Units by mouth daily.      tobramycin 0.3 % Ophthalmic Solution Place 1 drop into both eyes every 6 (six) hours for 7 days. (Patient not taking: Reported on 12/11/2024) 5 mL 0    Spacer/Aero-Holding Chambers Does not apply Device Use inhaler with Albuterol as needed (Patient not taking: Reported on 5/20/2024) 1 each 0       Allergies:  Allergies[1]      Depression Screening (PHQ-2/PHQ-9): Over the LAST 2 WEEKS                         ROS:   Review of Systems   All other systems reviewed and are negative.      PHYSICAL EXAM:     Vitals:    12/11/24 1427   BP: 132/80   BP Location: Right arm   Patient Position: Sitting   Cuff Size: adult   Pulse: 70   Resp: 18   SpO2: 99%   Weight: 126 lb (57.2 kg)   Height: 5' 3.5\" (1.613 m)     Physical Exam  Vitals reviewed.   Constitutional:       General: She is not in acute distress.  Cardiovascular:       Rate and Rhythm: Normal rate.   Pulmonary:      Effort: Pulmonary effort is normal. No respiratory distress.   Abdominal:      Palpations: Abdomen is soft.      Tenderness: There is no abdominal tenderness. There is right CVA tenderness and left CVA tenderness.   Neurological:      Mental Status: She is alert.         ASSESSMENT/PLAN:   (R35.0) Urinary frequency  (primary encounter diagnosis)  (R30.0) Dysuria  Plan:   UA suggestive of urinary tract infection, positive nitrates.  Macrobid 100 mg twice daily x 5 days to pharmacy.  Needs to increase hydration with water.  Urine sent for culture, will await results             Responsible party/patient verbalized understanding of information discussed. No barriers to learning observed.            Orders This Visit:  Orders Placed This Encounter   Procedures    POC Urinalysis, Manual Dip without microscopy [[id number]]    Urine Culture, Routine [E]       Meds This Visit:  Requested Prescriptions     Signed Prescriptions Disp Refills    nitrofurantoin monohydrate macro 100 MG Oral Cap 10 capsule 0     Sig: Take 1 capsule (100 mg total) by mouth 2 (two) times daily for 5 days.       Imaging & Referrals:  None     Chaperone offered at visit today.     The 21st Century cures Act makes medical notes like these available to patients in the interest of transparency.  However, be advised that this is a medical document.  It is intended as peer to peer communication.  It is written in medical language and may contain abbreviations or verbiage that are unfamiliar.  It may appear blunt or direct.  Medical documents are intended to carry relevant information, facts as evident, and the clinical opinion of the practitioner.      This note was created by [employer] voice recognition. Errors in content may be related to improper recognition by the system; efforts to review and correct have been done but errors may still exist. Please contact me with any questions.       12/11/2024  Curtis  MD Nicholas       [1] No Known Allergies

## 2024-12-11 NOTE — TELEPHONE ENCOUNTER
Please reply to pool: EM RN TRIAGE  Action Requested: Summary for Provider     []  Critical Lab, Recommendations Needed  [] Need Additional Advice  [x]   FYI    []   Need Orders  [] Need Medications Sent to Pharmacy  []  Other     SUMMARY: Patient contacts clinic reporting burning with urination and urinary frequency x 2-3 days.  Denies fever, body aches, chills, flank or side pain.  Denies vaginal discharge.  Does have some \"stomach\" discomfort.  Acute visit scheduled today.     Reason for call: Dysuria  Onset: Data Unavailable                       Reason for Disposition   Urinating more frequently than usual (i.e., frequency)   Can't control passage of urine (i.e., urinary incontinence) and new-onset (< 2 weeks) or worsening    Protocols used: Urinary Symptoms-A-OH

## 2025-02-28 ENCOUNTER — APPOINTMENT (OUTPATIENT)
Dept: CT IMAGING | Facility: HOSPITAL | Age: 75
End: 2025-02-28
Attending: EMERGENCY MEDICINE
Payer: MEDICARE

## 2025-02-28 ENCOUNTER — HOSPITAL ENCOUNTER (EMERGENCY)
Facility: HOSPITAL | Age: 75
Discharge: HOME | End: 2025-02-28
Attending: EMERGENCY MEDICINE
Payer: MEDICARE

## 2025-02-28 ENCOUNTER — NURSE TRIAGE (OUTPATIENT)
Dept: FAMILY MEDICINE CLINIC | Facility: CLINIC | Age: 75
End: 2025-02-28

## 2025-02-28 VITALS
SYSTOLIC BLOOD PRESSURE: 131 MMHG | TEMPERATURE: 98 F | HEIGHT: 63 IN | BODY MASS INDEX: 22.15 KG/M2 | DIASTOLIC BLOOD PRESSURE: 70 MMHG | OXYGEN SATURATION: 98 % | RESPIRATION RATE: 20 BRPM | WEIGHT: 125 LBS | HEART RATE: 64 BPM

## 2025-02-28 DIAGNOSIS — M54.2 NECK PAIN: Primary | ICD-10-CM

## 2025-02-28 PROCEDURE — 96372 THER/PROPH/DIAG INJ SC/IM: CPT

## 2025-02-28 PROCEDURE — 72125 CT NECK SPINE W/O DYE: CPT | Performed by: EMERGENCY MEDICINE

## 2025-02-28 PROCEDURE — 70450 CT HEAD/BRAIN W/O DYE: CPT | Performed by: EMERGENCY MEDICINE

## 2025-02-28 PROCEDURE — 99284 EMERGENCY DEPT VISIT MOD MDM: CPT

## 2025-02-28 RX ORDER — KETOROLAC TROMETHAMINE 30 MG/ML
30 INJECTION, SOLUTION INTRAMUSCULAR; INTRAVENOUS ONCE
Status: COMPLETED | OUTPATIENT
Start: 2025-02-28 | End: 2025-02-28

## 2025-02-28 RX ORDER — NAPROXEN 500 MG/1
500 TABLET ORAL 2 TIMES DAILY PRN
Qty: 14 TABLET | Refills: 0 | Status: SHIPPED | OUTPATIENT
Start: 2025-02-28 | End: 2025-03-07

## 2025-02-28 NOTE — ED INITIAL ASSESSMENT (HPI)
Pt to ED with c/o bilateral neck pain that started one week ago. Pt denies fall or trauma. Pt denies fever. Pt states minimal relief from ibuprofen and tylenol at home.

## 2025-02-28 NOTE — ED QUICK NOTES
Pt updated on plan of care, verbalized understanding.  Pt reporting that pain is much improved.  Pt denies any new needs or complaints at this time.

## 2025-02-28 NOTE — TELEPHONE ENCOUNTER
Action Requested: Summary for Provider     []  Critical Lab, Recommendations Needed  [] Need Additional Advice  []   FYI    []   Need Orders  [] Need Medications Sent to Pharmacy  []  Other     SUMMARY: Patient reports 'head pain' for 6 days now. Also neck pain. Patient states pain occurs when moving from side to side saw a chiropractor yesterday. Not better. Will see chiropractor again tomorrow. Pain medication not helpful. Denies weakness to extremities, however, moving slowly per Patient.   Advised Immediate care in [place]. No more appointments in [place] today.  Patient agreeable,  will take her.      Reason for call: Headache (No dizziness, neck, left side)  Onset: Data Unavailable                     Reason for Disposition   SEVERE pain (e.g., excruciating, unable to do any normal activities)    Protocols used: Neck Pain or Onxexdkdi-A-OS

## 2025-02-28 NOTE — ED PROVIDER NOTES
Patient Seen in: [facility] Emergency Department      History     Chief Complaint   Patient presents with    Neck Pain     Stated Complaint: neck pain rt. side    Subjective:   HPI      Patient is a 74-year-old female with a history as listed below.  She complains of neck pain for the last 6 or 7 days.  She states it is more on the left than the right side.  Does not radiate to her extremities no weakness numbness or tingling.  There is no trauma or injury.  No headache.  She states she saw a chiropractor yesterday with no relief.    Objective:     Past Medical History:    Allergic dermatitis    Depression    Dyschromia    Hiatal hernia    History of anxiety    per [employer]:     Osteoarthritis    Osteoarthrosis    Radial styloid tenosynovitis    Rheumatoid arthritis (HCC)    Sinus tachycardia    neg stress, EKG normal    Spondylolisthesis    L4-L5    Tremors of nervous system    Vocal cord polyp              Past Surgical History:   Procedure Laterality Date    Back surgery  09/01/2011    Posterior spinal fusion and  instrumentation minimal invasive at L4-L5 with laminectomy, L4-L5 posterolateral fusion. Dr. He Ovalles    Breast reconstruction      Breast surgery Bilateral 2013    for cosmetic reason    Colonoscopy  07/2020    Colonoscopy N/A 07/28/2020    Procedure: COLONOSCOPY, POSSIBLE BIOPSY, POSSIBLE POLYPECTOMY 17536;  Surgeon: Robe Gutierres MD;  Location: [facility], [facility]    Hysterectomy  1991    fibroids    Knee arthroscopy Right 2004    Knee arthroscopy Left 2004 & 2000    arthroscope x2    Oophorectomy Bilateral 10/29/2001    salpingo-oophorectomy. Dr. Shayy Galeas    Other surgical history  06/22/2004    Procedure for prolapsed hemorrhoids; both external and internal complicated hemorrhoids. Dr. EJ Villar                Social History     Socioeconomic History    Marital status:    Tobacco Use    Smoking status: Former     Current packs/day: 0.00     Types: Cigarettes      Start date: 1967     Quit date: 1973     Years since quittin.1    Smokeless tobacco: Never   Vaping Use    Vaping status: Never Used   Substance and Sexual Activity    Alcohol use: Yes     Comment: 1-2 drinks occasionally, 3-4 times per year.    Drug use: No   Other Topics Concern    Caffeine Concern Yes     Comment: 1-2 large cups coffee daily     Social Drivers of Health      [facility]    Housing Stability                  Physical Exam     ED Triage Vitals [25 1430]   /82   Pulse 101   Resp 18   Temp 97.5 °F (36.4 °C)   Temp src Oral   SpO2 95 %   O2 Device None (Room air)       Current Vitals:   Vital Signs  BP: 131/70  Pulse: 64  Resp: 20  Temp: 97.5 °F (36.4 °C)  Temp src: Oral    Oxygen Therapy  SpO2: 98 %  O2 Device: None (Room air)        Physical Exam  Constitutional: Oriented to person, place, and time. Appears well-developed and well-nourished.   HEENT:   Head: Normocephalic and atraumatic.   Right Ear: External ear normal.   Left Ear: External ear normal.   Nose: Nose normal.   Mouth/Throat: Oropharynx is clear and moist.   Eyes: Conjunctivae and EOM are normal. Pupils are equal, round, and reactive to light.   Neck: There is bilateral paraspinal muscle tenderness left more than right.  Pain with decreased range of motion  Cardiovascular: Normal rate, regular rhythm, normal heart sounds and intact distal pulses.    Pulmonary/Chest: Effort normal and breath sounds normal. No respiratory distress.   Abdominal: Soft. Bowel sounds are normal. Exhibits no distension and no mass. There is no tenderness. There is no rebound and no guarding.   Musculoskeletal: Normal range of motion. Exhibits no edema or tenderness.   Lymphadenopathy: No cervical adenopathy.   Neurological: Alert and oriented to person, place, and time. Normal reflexes. No cranial nerve deficit. No motor os sensory defecits noted Coordination normal.    Skin: Skin is warm and dry.   Psychiatric: Normal mood and affect. Behavior is normal. Judgment and thought content normal.   Nursing note and vitals reviewed.        ED Course   Labs Reviewed - No data to display       CT BRAIN OR HEAD (CPT=70450)    Result Date: 2/28/2025  CONCLUSION:  1. No acute intracranial finding. 2. Mild changes of chronic small vessel disease in cerebral white matter. 3. Right maxillary sinusitis may be acute or chronic.    Dictated by (CST): Gabe Cornejo MD on 2/28/2025 at 5:50 PM     Finalized by (CST): Gabe Cornejo MD on 2/28/2025 at 5:52 PM          CT SPINE CERVICAL (CPT=72125)    Result Date: 2/28/2025  CONCLUSION:  1. No acute fracture or traumatic subluxation of the cervical spine. 2. Advanced multilevel cervical spine degenerative changes.  Findings are most pronounced at C4-C5 and C5-C6 with moderate to severe right neural foraminal stenosis at both of these levels. 3. Mild-to-moderate dextroscoliosis of the cervical spine. 4. Lesser incidental findings as above.   Dictated by (CST): Sarath Monet MD on 2/28/2025 at 5:44 PM     Finalized by (CST): Sarath Monet MD on 2/28/2025 at 5:47 PM                  MDM      Use of independent historian:  at bedside helps with history.    I personally reviewed and interpreted the images : No fracture or subluxation seen on CT C-spine    CT BRAIN OR HEAD (CPT=70450)    Result Date: 2/28/2025  CONCLUSION:  1. No acute intracranial finding. 2. Mild changes of chronic small vessel disease in cerebral white matter. 3. Right maxillary sinusitis may be acute or chronic.    Dictated by (CST): Gabe Cornejo MD on 2/28/2025 at 5:50 PM     Finalized by (CST): Gabe Cornejo MD on 2/28/2025 at 5:52 PM          CT SPINE CERVICAL (CPT=72125)    Result Date: 2/28/2025  CONCLUSION:  1. No acute fracture or traumatic subluxation of the cervical spine. 2. Advanced multilevel cervical spine degenerative changes.  Findings are most pronounced  at C4-C5 and C5-C6 with moderate to severe right neural foraminal stenosis at both of these levels. 3. Mild-to-moderate dextroscoliosis of the cervical spine. 4. Lesser incidental findings as above.   Dictated by (CST): Sarath Monet MD on 2/28/2025 at 5:44 PM     Finalized by (CST): Sarath Monet MD on 2/28/2025 at 5:47 PM           Vitals:    02/28/25 1430 02/28/25 1746   BP: 159/82 131/70   Pulse: 101 64   Resp: 18 20   Temp: 97.5 °F (36.4 °C)    TempSrc: Oral    SpO2: 95% 98%   Weight: 56.7 kg    Height: 160 cm (5' 3\")      *I personally reviewed and interpreted all ED vitals.    Pulse Ox: 95%, Room air, Normal         Differential Diagnosis/ Diagnostic Considerations: Neck pain consider fracture subluxation consider muscle strain    Medical Record Review: I personally reviewed available prior medical records for any recent pertinent discharge summaries, testing, and procedures and reviewed those reports and found telephone triage note from today per primary care and needing a patient was complaining of headache notes reviewed.    Complicating Factors: The patient already has depression dyschromia hiatal hernia anxiety osteoarthritis rheumatoid arthritis which contribute to the complexity of this ED evaluation.    Social determinants of health:    Prescription drug management:      Shared Decision Making:    ED Course: CT findings shared with patient she did have sick considerable relief with Toradol will discharge home on Naprosyn encouraged her to follow-up with her primary care doctor    Discussion of management with other healthcare providers:    Condition upon leaving the department: Stable          Medical Decision Making      Disposition and Plan     Clinical Impression:  1. Neck pain         Disposition:  Discharge  2/28/2025  6:05 pm    Follow-up:  Curtis Peterson MD  [address] [place] [id number]  [phone]    Follow up in 2 day(s)      We recommend that you schedule follow up care  with a primary care provider within the next three months to obtain basic health screening including reassessment of your blood pressure.      Medications Prescribed:  Current Discharge Medication List        START taking these medications    Details   naproxen 500 MG Oral Tab Take 1 tablet (500 mg total) by mouth 2 (two) times daily as needed.  Qty: 14 tablet, Refills: 0                 Supplementary Documentation:

## 2025-03-01 ENCOUNTER — TELEPHONE (OUTPATIENT)
Dept: FAMILY MEDICINE CLINIC | Facility: CLINIC | Age: 75
End: 2025-03-01

## 2025-03-01 NOTE — TELEPHONE ENCOUNTER
Patient went to the ER yesterday and it was determined she has stenosis in her neck.  Patient is asking if she should still keep her appointment with her chiropractor today?

## 2025-03-05 ENCOUNTER — TELEPHONE (OUTPATIENT)
Dept: FAMILY MEDICINE CLINIC | Facility: CLINIC | Age: 75
End: 2025-03-05

## 2025-03-05 ENCOUNTER — OFFICE VISIT (OUTPATIENT)
Dept: FAMILY MEDICINE CLINIC | Facility: CLINIC | Age: 75
End: 2025-03-05

## 2025-03-05 VITALS
HEART RATE: 70 BPM | WEIGHT: 128 LBS | BODY MASS INDEX: 22.68 KG/M2 | DIASTOLIC BLOOD PRESSURE: 86 MMHG | SYSTOLIC BLOOD PRESSURE: 130 MMHG | HEIGHT: 63 IN

## 2025-03-05 DIAGNOSIS — M47.812 OSTEOARTHRITIS OF CERVICAL SPINE, UNSPECIFIED SPINAL OSTEOARTHRITIS COMPLICATION STATUS: ICD-10-CM

## 2025-03-05 DIAGNOSIS — M48.02 FORAMINAL STENOSIS OF CERVICAL REGION: Primary | ICD-10-CM

## 2025-03-05 DIAGNOSIS — M17.10 ARTHRITIS OF KNEE: ICD-10-CM

## 2025-03-05 PROCEDURE — 1160F RVW MEDS BY RX/DR IN RCRD: CPT | Performed by: FAMILY MEDICINE

## 2025-03-05 PROCEDURE — 3075F SYST BP GE 130 - 139MM HG: CPT | Performed by: FAMILY MEDICINE

## 2025-03-05 PROCEDURE — 3079F DIAST BP 80-89 MM HG: CPT | Performed by: FAMILY MEDICINE

## 2025-03-05 PROCEDURE — G2211 COMPLEX E/M VISIT ADD ON: HCPCS | Performed by: FAMILY MEDICINE

## 2025-03-05 PROCEDURE — 1159F MED LIST DOCD IN RCRD: CPT | Performed by: FAMILY MEDICINE

## 2025-03-05 PROCEDURE — 99214 OFFICE O/P EST MOD 30 MIN: CPT | Performed by: FAMILY MEDICINE

## 2025-03-05 PROCEDURE — 1125F AMNT PAIN NOTED PAIN PRSNT: CPT | Performed by: FAMILY MEDICINE

## 2025-03-05 PROCEDURE — 3008F BODY MASS INDEX DOCD: CPT | Performed by: FAMILY MEDICINE

## 2025-03-05 RX ORDER — TOPIRAMATE 25 MG/1
25 TABLET, FILM COATED ORAL DAILY
Qty: 90 TABLET | Refills: 3 | Status: SHIPPED | OUTPATIENT
Start: 2025-03-05

## 2025-03-05 RX ORDER — ATORVASTATIN CALCIUM 40 MG/1
40 TABLET, FILM COATED ORAL NIGHTLY
COMMUNITY
Start: 2024-12-14

## 2025-03-05 NOTE — TELEPHONE ENCOUNTER
Patient was seen in clinic today and request to cancel her prescription for her cholesterol. Patient still has medication, thanks.

## 2025-03-05 NOTE — PROGRESS NOTES
HPI:    Kimberlyn Marcial is a 74 year old female presents to clinic for ER follow-up.  On 2/28 patient was seen in the ER for severe bilateral neck pain.  Denies known trauma or injury.  She did not have any numbness lastingly in extremities.  Since then, symptoms have resolved.  She is seeing her chiropractor for an adjustment which has helped tremendously.  Currently asymptomatic.  She would like to discuss CT results.     HISTORY:  Past Medical History:    Allergic dermatitis    Depression    Dyschromia    Hiatal hernia    History of anxiety    per [employer]:     Osteoarthritis    Osteoarthrosis    Radial styloid tenosynovitis    Rheumatoid arthritis (HCC)    Sinus tachycardia    neg stress, EKG normal    Spondylolisthesis    L4-L5    Tremors of nervous system    Vocal cord polyp      Past Surgical History:   Procedure Laterality Date    Back surgery  09/01/2011    Posterior spinal fusion and  instrumentation minimal invasive at L4-L5 with laminectomy, L4-L5 posterolateral fusion. Dr. He Ovalles    Breast reconstruction      Breast surgery Bilateral 2013    for cosmetic reason    Colonoscopy  07/2020    Colonoscopy N/A 07/28/2020    Procedure: COLONOSCOPY, POSSIBLE BIOPSY, POSSIBLE POLYPECTOMY 36767;  Surgeon: Robe Gutierres MD;  [facility]    Hysterectomy  1991    fibroids    Knee arthroscopy Right 2004    Knee arthroscopy Left 2004 & 2000    arthroscope x2    Oophorectomy Bilateral 10/29/2001    salpingo-oophorectomy. Dr. Shayy Galeas    Other surgical history  06/22/2004    Procedure for prolapsed hemorrhoids; both external and internal complicated hemorrhoids. Dr. EJ Villar      Family History   Problem Relation Age of Onset    Cancer Father         abdominal    Arthritis Mother     Cancer Mother         ovarian    Ovarian Cancer Mother         36    Cancer Paternal Uncle         brain    Ovarian Cancer Sister         24      Social History:   Social History     Socioeconomic  History    Marital status:    Tobacco Use    Smoking status: Former     Current packs/day: 0.00     Types: Cigarettes     Start date: 1967     Quit date: 1973     Years since quittin.2    Smokeless tobacco: Never   Vaping Use    Vaping status: Never Used   Substance and Sexual Activity    Alcohol use: Yes     Comment: 1-2 drinks occasionally, 3-4 times per year.    Drug use: No   Other Topics Concern    Caffeine Concern Yes     Comment: 1-2 large cups coffee daily     Social Drivers of Health      Received from [facility], [facility]    Housing Stability        Medications (Active prior to today's visit):  Current Outpatient Medications   Medication Sig Dispense Refill    topiramate 25 MG Oral Tab Take 1 tablet (25 mg total) by mouth daily. 90 tablet 3    atorvastatin 40 MG Oral Tab Take 1 tablet (40 mg total) by mouth nightly.      naproxen 500 MG Oral Tab Take 1 tablet (500 mg total) by mouth 2 (two) times daily as needed. 14 tablet 0    amitriptyline 25 MG Oral Tab Take 1 tablet (25 mg total) by mouth nightly. 90 tablet 3    omeprazole 20 MG Oral Capsule Delayed Release Take 1 capsule (20 mg total) by mouth before breakfast. 90 capsule 3    Mirabegron ER (MYRBETRIQ) 25 MG Oral Tablet 24 Hr Take 1 tablet (25 mg total) by mouth daily. 90 tablet 3    Spacer/Aero-Holding Chambers Does not apply Device Use inhaler with Albuterol as needed 1 each 0    Multiple Vitamins-Minerals (MULTIVITAMIN ADULTS) Oral Tab Take 1 tablet by mouth daily.      Ergocalciferol (VITAMIN D OR) Take 400 Units by mouth daily.         Allergies:  Allergies[1]    ROS:   Review of Systems   All other systems reviewed and are negative.      PHYSICAL EXAM:     Vitals:    25 1256   BP: 130/86   BP Location: Right arm   Patient Position: Sitting   Cuff Size: adult   Pulse: 70   Weight: 128 lb (58.1 kg)   Height: 5' 3\" (1.6 m)     Physical Exam  Vitals reviewed.   Constitutional:        General: She is not in acute distress.  Cardiovascular:      Rate and Rhythm: Normal rate.   Pulmonary:      Effort: Pulmonary effort is normal. No respiratory distress.   Neurological:      Mental Status: She is alert.   Psychiatric:         Mood and Affect: Mood normal.         ASSESSMENT/PLAN:   (M48.02) Foraminal stenosis of cervical region  (primary encounter diagnosis)  (M47.812) Osteoarthritis of cervical spine, unspecified spinal osteoarthritis complication status  Plan:   -Currently asymptomatic.  Recommended patient exercise caution while seeing chiropractor.  Physiatry referral placed.    (M17.10) Arthritis of knee  Plan: Ortho Referral - In Network  - Received notice that Dr. Gao is no onger with the practice, Needs referral for new orthopedic, order placed.               Responsible party/patient verbalized understanding of information discussed. No barriers to learning observed.            Orders This Visit:  No orders of the defined types were placed in this encounter.      Meds This Visit:  Requested Prescriptions      No prescriptions requested or ordered in this encounter       Imaging & Referrals:  PHYSIATRY - INTERNAL  ORTHOPEDIC - INTERNAL     Chaperone offered at visit today.     The 21st Century cures Act makes medical notes like these available to patients in the interest of transparency.  However, be advised that this is a medical document.  It is intended as peer to peer communication.  It is written in medical language and may contain abbreviations or verbiage that are unfamiliar.  It may appear blunt or direct.  Medical documents are intended to carry relevant information, facts as evident, and the clinical opinion of the practitioner.      This note was created by [employer] voice recognition. Errors in content may be related to improper recognition by the system; efforts to review and correct have been done but errors may still exist. Please contact me with any questions.        3/5/2025  Curtis Peterson MD       [1] No Known Allergies     Attending Only

## 2025-03-07 ENCOUNTER — TELEPHONE (OUTPATIENT)
Dept: FAMILY MEDICINE CLINIC | Facility: CLINIC | Age: 75
End: 2025-03-07

## 2025-03-07 DIAGNOSIS — M17.10 ARTHRITIS OF KNEE: Primary | ICD-10-CM

## 2025-03-07 NOTE — TELEPHONE ENCOUNTER
Patient can not get an appointment with Dr. Deleon until 5/5/25. Patient is asking if the doctor can provide a different referral for an orthopaedic doctor who can give her an earlier appointment?

## 2025-03-10 ENCOUNTER — NURSE TRIAGE (OUTPATIENT)
Dept: FAMILY MEDICINE CLINIC | Facility: CLINIC | Age: 75
End: 2025-03-10

## 2025-03-10 NOTE — TELEPHONE ENCOUNTER
Action Requested: Summary for Provider     []  Critical Lab, Recommendations Needed  [] Need Additional Advice  [x]   FYI    []   Need Orders  [] Need Medications Sent to Pharmacy  []  Other     SUMMARY:   Spoke with patient, Date of Birth verified  She stated her skin is dry, its mostly her   face, mouth, eye is dry and itchy like she has eczema, it's red on top of her eye, she is not sure if she need cream.   She applied Triamcinolone cream, ineffective.  She denies chest pain, shortness of breath, fever, drainage, blurred vision, no other sx.   Pt was advised if sx persist or gets worse to go to ER/ IC, she agreed and stated understanding.  Appt made with Diana MICHELLE for eval & treat.               Reason for call: skin problem  Onset: 3 days         Reason for Disposition   Patient wants to be seen    Protocols used: Itching - [id number]    Future Appointments   Date Time Provider Department Center   3/13/2025  2:20 PM [facility]   3/18/2025 11:30 AM [facility]   5/7/2025  3:00 PM Behar, Alex, MD PM&R [facility]

## 2025-03-10 NOTE — TELEPHONE ENCOUNTER
Called and notified patient. She verbalized understanding and will call to schedule.  Patient had questions regarding rash/dryness on her face, transferred to RN to discuss new symptoms.

## 2025-03-11 ENCOUNTER — TELEPHONE (OUTPATIENT)
Facility: CLINIC | Age: 75
End: 2025-03-11

## 2025-03-11 NOTE — TELEPHONE ENCOUNTER
New pt appt for neck pain, imaging avail in [facility]   Future Appointments   Date Time Provider Department Center   3/12/2025  8:15 AM Juan Ramos MD [facility]   3/13/2025  2:20 PM [facility]   3/14/2025  8:30 AM Emeka Martinez PA EM[facility]   3/18/2025 11:30 AM [facility]   5/7/2025  3:00 PM Behar, Alex, MD PM[facility]

## 2025-03-11 NOTE — TELEPHONE ENCOUNTER
New pt appt for left knee, imaging from 07/24, pt advised to arrived early for imaging   Future Appointments   Date Time Provider Department Center   3/12/2025  8:15 AM Juan Ramos MD [facility]   3/13/2025  2:20 PM [facility]   3/14/2025  8:30 AM Emeka Martinez PA [facility]   3/18/2025 11:30 AM [facility]   5/7/2025  3:00 PM Behar, Alex, MD PM[facility]R [facility]

## 2025-03-11 NOTE — TELEPHONE ENCOUNTER
Called and spoke w/ patient.   CT cervical 2/28/25 in [facility]   Patient states she has not scheduled w/ physiatry per PCP recommendation.   She states she prefers to see Emeka QUILES to review CT cervical and discuss next steps.     Future Appointments   Date Time Provider Department Center   3/12/2025  8:15 AM Juan Ramos MD EM[facility] OR[facility] E[facility] 10 [facility]   3/13/2025  2:20 PM [facility]   3/14/2025  8:30 AM Emeka Martinez PA EM[facility] OR[facility] E[facility] 10 [facility]   3/18/2025 11:30 AM [facility]   5/7/2025  3:00 PM Behar, Alex, MD PM[facility]R [facility]

## 2025-03-12 ENCOUNTER — HOSPITAL ENCOUNTER (OUTPATIENT)
Dept: GENERAL RADIOLOGY | Facility: HOSPITAL | Age: 75
Discharge: HOME | End: 2025-03-12
Attending: ORTHOPAEDIC SURGERY
Payer: MEDICARE

## 2025-03-12 ENCOUNTER — OFFICE VISIT (OUTPATIENT)
Age: 75
End: 2025-03-12
Payer: COMMERCIAL

## 2025-03-12 VITALS
HEIGHT: 63.5 IN | WEIGHT: 127 LBS | SYSTOLIC BLOOD PRESSURE: 130 MMHG | BODY MASS INDEX: 22.23 KG/M2 | DIASTOLIC BLOOD PRESSURE: 86 MMHG

## 2025-03-12 DIAGNOSIS — M25.561 PAIN IN BOTH KNEES, UNSPECIFIED CHRONICITY: ICD-10-CM

## 2025-03-12 DIAGNOSIS — M25.562 PAIN IN BOTH KNEES, UNSPECIFIED CHRONICITY: ICD-10-CM

## 2025-03-12 DIAGNOSIS — M17.0 PRIMARY OSTEOARTHRITIS OF BOTH KNEES: ICD-10-CM

## 2025-03-12 DIAGNOSIS — M25.562 LEFT KNEE PAIN, UNSPECIFIED CHRONICITY: Primary | ICD-10-CM

## 2025-03-12 PROCEDURE — 20610 DRAIN/INJ JOINT/BURSA W/O US: CPT | Performed by: ORTHOPAEDIC SURGERY

## 2025-03-12 PROCEDURE — 1159F MED LIST DOCD IN RCRD: CPT | Performed by: ORTHOPAEDIC SURGERY

## 2025-03-12 PROCEDURE — 1160F RVW MEDS BY RX/DR IN RCRD: CPT | Performed by: ORTHOPAEDIC SURGERY

## 2025-03-12 PROCEDURE — 3075F SYST BP GE 130 - 139MM HG: CPT | Performed by: ORTHOPAEDIC SURGERY

## 2025-03-12 PROCEDURE — 3079F DIAST BP 80-89 MM HG: CPT | Performed by: ORTHOPAEDIC SURGERY

## 2025-03-12 PROCEDURE — 3008F BODY MASS INDEX DOCD: CPT | Performed by: ORTHOPAEDIC SURGERY

## 2025-03-12 PROCEDURE — 73564 X-RAY EXAM KNEE 4 OR MORE: CPT | Performed by: ORTHOPAEDIC SURGERY

## 2025-03-12 PROCEDURE — 99204 OFFICE O/P NEW MOD 45 MIN: CPT | Performed by: ORTHOPAEDIC SURGERY

## 2025-03-12 RX ORDER — TRIAMCINOLONE ACETONIDE 40 MG/ML
40 INJECTION, SUSPENSION INTRA-ARTICULAR; INTRAMUSCULAR ONCE
Status: COMPLETED | OUTPATIENT
Start: 2025-03-12 | End: 2025-03-12

## 2025-03-12 RX ADMIN — TRIAMCINOLONE ACETONIDE 40 MG: 40 INJECTION, SUSPENSION INTRA-ARTICULAR; INTRAMUSCULAR at 11:12:00

## 2025-03-12 RX ADMIN — TRIAMCINOLONE ACETONIDE 40 MG: 40 INJECTION, SUSPENSION INTRA-ARTICULAR; INTRAMUSCULAR at 11:13:00

## 2025-03-12 NOTE — PROGRESS NOTES
Chief Complaint: Knee Pain (B/l knee -  L knee is worse. Pain onset for about 2 year. Was given cortisone injection to left in the  past with months of relief.  Rates pain 4/10. Pain is constant. Swelling I knee at times. )      HPI  Ms. Marcial is referred by No ref. provider found. Kimberlyn Marcial is a 74 year old female being seen in the office today for Knee Pain (B/l knee -  L knee is worse. Pain onset for about 2 year. Was given cortisone injection to left in the  past with months of relief.  Rates pain 4/10. Pain is constant. Swelling I knee at times. )    Patient is a 74-year-old female presents today with complaints of bilateral knee pain.  Left is worse than right.  She states is a chronic issue this been going on for several years.  In the past she has been diagnosed with rheumatoid arthritis.  She does not follow with a rheumatologist.  She is not on any medications or DMARDs.  She states that in the past she was but did not tolerate these well.  She has been seen by Dr. Viveros's group and has been treated in the past conservatively.  Her last treatment was back in July where she got a cortisone injection.  She states that this gave her good relief for approximately 3 months.  The pain has slowly started to come back.  Currently she rates the pain as mild to moderate on the right and moderate to severe on the left depending on activity.  She localizes the pain in the joint line for both.  It is described as a sharp shooting pain with weightbearing activities, and a dull ache at rest.  She denies any numbness or tingling.  She denies any mechanical symptoms, locking symptoms or instability symptoms.  She denies any hip or back pain.  She occasionally uses over-the-counter anti-inflammatories and Tylenol.  She also does endorse a history of bilateral knee arthroscopies over a decade ago.  Otherwise she has not had any other formal treatment recently.  She does not think this is affecting her quality life.  She  is still able to perform simple ADLs.  She is here with her .  She is a retired .    History:  Past Medical History:    Allergic dermatitis    Depression    Dyschromia    Hiatal hernia    History of anxiety    per [employer]:     Osteoarthritis    Osteoarthrosis    Radial styloid tenosynovitis    Rheumatoid arthritis (HCC)    Sinus tachycardia    neg stress, EKG normal    Spondylolisthesis    L4-L5    Tremors of nervous system    Vocal cord polyp     Past Surgical History:   Procedure Laterality Date    Back surgery  2011    Posterior spinal fusion and  instrumentation minimal invasive at L4-L5 with laminectomy, L4-L5 posterolateral fusion. Dr. He Ovalles    Breast reconstruction      Breast surgery Bilateral     for cosmetic reason    Colonoscopy  2020    Colonoscopy N/A 2020    Procedure: COLONOSCOPY, POSSIBLE BIOPSY, POSSIBLE POLYPECTOMY 25567;  Surgeon: Robe Gutierres MD;  [facility]    Hysterectomy  1991    fibroids    Knee arthroscopy Right 2004    Knee arthroscopy Left  &     arthroscope x2    Oophorectomy Bilateral 10/29/2001    salpingo-oophorectomy. Dr. Shayy Galeas    Other surgical history  2004    Procedure for prolapsed hemorrhoids; both external and internal complicated hemorrhoids. Dr. EJ Villar     Family History   Problem Relation Age of Onset    Cancer Father         abdominal    Arthritis Mother     Cancer Mother         ovarian    Ovarian Cancer Mother         36    Cancer Paternal Uncle         brain    Ovarian Cancer Sister         24     Family Status   Relation Status    Fa  at age 52    Mo Alive    Paternal Unc (Not Specified)    Sis (Not Specified)     Social History     Occupational History    Not on file   Tobacco Use    Smoking status: Former     Current packs/day: 0.00     Types: Cigarettes     Start date: 1967     Quit date: 1973     Years since quittin.2    Smokeless tobacco:  Never   Vaping Use    Vaping status: Never Used   Substance and Sexual Activity    Alcohol use: Yes     Comment: 1-2 drinks occasionally, 3-4 times per year.    Drug use: No    Sexual activity: Not on file       Medications:  Current Outpatient Medications   Medication Sig Dispense Refill    topiramate 25 MG Oral Tab Take 1 tablet (25 mg total) by mouth daily. 90 tablet 3    atorvastatin 40 MG Oral Tab Take 1 tablet (40 mg total) by mouth nightly.      amitriptyline 25 MG Oral Tab Take 1 tablet (25 mg total) by mouth nightly. 90 tablet 3    omeprazole 20 MG Oral Capsule Delayed Release Take 1 capsule (20 mg total) by mouth before breakfast. 90 capsule 3    Mirabegron ER (MYRBETRIQ) 25 MG Oral Tablet 24 Hr Take 1 tablet (25 mg total) by mouth daily. 90 tablet 3    Spacer/Aero-Holding Chambers Does not apply Device Use inhaler with Albuterol as needed 1 each 0    Multiple Vitamins-Minerals (MULTIVITAMIN ADULTS) Oral Tab Take 1 tablet by mouth daily.      Ergocalciferol (VITAMIN D OR) Take 400 Units by mouth daily.         Allergies:  Allergies[1]    Review of Systems  A comprehensive review of systems was completed and is negative unless noted above or in the HPI.    Physical Exam  Ht 5' 3.5\" (1.613 m)   Wt 127 lb (57.6 kg)   BMI 22.14 kg/m²   Body mass index is 22.14 kg/m².    Constitutional: The patient appears well-developed and well-nourished, in no apparent distress.   Psychiatric: The patient demonstrates good comprehension, judgment and decision making. Normal mood and affect.  Eyes: PER and EOM are normal.  ENT: Hearing appropriate for normal conversation.  Cardiovascular: The patient has symmetric pulses, 2+.  Distal extremity is warm and well perfused with good capillary refill.  Respiratory:  The patient is breathing comfortably without increased respiratory effort or use of accessory muscles.  Hematologic/Lymphatic: No lymphangitis. There is no appreciable enlargement of lymph nodes. No calf swelling,  calf non-tender, negative Mcallister's sign. No edema.  Skin: No wounds or ulcers. No hypertrophic scarring.  Neck: FROM without pain.  MSK:  Gait: Antalgic to the left    bilateral Knee        Alignment: Right valgus, correctable, left varus correctable       Skin: Intact       Swelling: None   Effusion: Positive on the left, trace on the right   Tenderness: Medial joint line and Lateral joint line       Range of Motion:      Extension: 0     Flexion: 120     Flexion Contracture: 0     Extensor La           McMurrays: Negative   Lachmann: Negative   Anterior Drawer: Negative   Posterior Drawer: Negative   Varus Stress Test: stable   Valgus Stress Test: stable       Patellar Tracking: Centrally   Patellar Crepitus: No   Extensor Mechanism: Intact       Hip ROM:   Intact       Sensation: intact   Motor: intact   Vascular: intact     Imaging:  I independently reviewed the patient's relevant imaging studies today.    4 weightbearing views of the affected knee were obtained today.  They demonstrate no acute fracture or dislocation.  They show complete obliteration of medial compartment joint space, osteophyte formation and subchondral sclerosis on the left consistent with Kellgren-Jon grade 3 4 osteoarthritis.  They demonstrate tricompartmental tibiofemoral joint space narrowing on the right with large loose bodies in the lateral gutter, Kellgren-Jon grade 2-3 osteoarthritis.      Labs:  Lab Results   Component Value Date    WBC 10.2 2024    HGB 12.2 2024    HCT 39.0 2024    .0 2024     Lab Results   Component Value Date    ALB 4.1 2024     Lab Results   Component Value Date    GLU 66 (L) 2024    GLU 98 2024    GLU 87 2022       Assessment and Plan  Assessment & Plan  Left knee pain, unspecified chronicity    Orders:    Large joint - left aspiration/injection    triamcinolone acetonide (Kenalog-40) 40 MG/ML injection 40 mg    Pain in both knees,  unspecified chronicity    Orders:    XR KNEE COMPLETE BILAT (CPT=73564); Future    Rheumatology Referral - In Network    Primary osteoarthritis of both knees    Orders:    Large joint - left aspiration/injection    triamcinolone acetonide (Kenalog-40) 40 MG/ML injection 40 mg    I had a lengthy discussion with the patient today about the patient's knee OA.  Nonoperative and operative options for knee OA were discussed with the patient at length. Nonoperative measures include activity modification, anti-inflammatories, weight loss if applicable, physical therapy or a home exercise program, bracing, cortisone injections and viscosupplementation injections.  Risks and benefits to injections were reviewed including but not limited to infection, skin discoloration/changes, temporary elevations in blood sugars, joint inflammatory reaction, persistent pain or increased pain at the injection site, and the chance that the injection may not help. If they fail these measures, and continue to suffer from functionally limiting pain that is negatively impacting their quality of life, a knee replacement can be considered. Specifically, I recommend the following:    They were given bilateral knee cortisone injections today.  I will also refer her to rheumatology to see if she is a candidate for any disease modifying drugs.  In the interim I would like her to continue activity modification, cryotherapy, use of amatory aids, anti-inflammatories as needed for symptomatic relief.  They were recommended low impact aerobic exercises such as biking, elliptical, and swimming/water therapy. They were recommended against high impact activities such as running on hard surfaces and deep squatting activities.  There are no structural abnormalities to their knee, so the patient can be WBAT and activities as tolerated.  Follow up: As needed  The patient understands and agrees with this plan. All of the patient's questions were answered today.       We discussed the diagnosis and treatment options and the patient felt they would benefit from the injection. I described the injection in detail including the risks, which include but are not limited to risk of infection, failure of treatment, local skin discoloration, temporary elevations in blood sugars, inflammatory reaction, persistent pain or increased pain at the injection site. Specifically we cautioned the patient regarding signs of infection and the need for immediate evaluation in this case. The patient verbalized an understanding of these risks and verbally consented to the injection. Allergies were reviewed.     Description of Procedure: An appropriate procedural time-out was performed confirming the patient’s name, date of birth, and the procedure to be performed. Following sterile prep, a mixture of 1mL 40mg/mL Kenalog, 2mL 1% lidocaine and 2mL 0.5% marcaine was injection into the bilateral knee using the inferolateral approach. There were no complications or difficulties. Hemostasis was achieved. A small sterile band aid dressing was applied. The patient tolerated the procedure well.     Post Injection Instructions: The patient was encouraged to ice the area immediately following the injection and to avoid strenuous activity with the involved extremity for remainder of the day. The patient verbalized an understanding and all of their questions and concerns were addressed.      BMI is in the acceptable range    Juan Ramos MD        [1] No Known Allergies

## 2025-03-12 NOTE — PROGRESS NOTES
Per Dr. Ramos draw up 2ml of Lido hanna 2%, 2ml Marcaine, ad 1ml of Kenolag 40 for bilateral  knee.

## 2025-03-13 ENCOUNTER — OFFICE VISIT (OUTPATIENT)
Dept: FAMILY MEDICINE CLINIC | Facility: CLINIC | Age: 75
End: 2025-03-13

## 2025-03-13 VITALS
OXYGEN SATURATION: 100 % | DIASTOLIC BLOOD PRESSURE: 72 MMHG | BODY MASS INDEX: 22.5 KG/M2 | WEIGHT: 127 LBS | HEART RATE: 58 BPM | HEIGHT: 63 IN | SYSTOLIC BLOOD PRESSURE: 124 MMHG

## 2025-03-13 DIAGNOSIS — L30.9 DERMATITIS: Primary | ICD-10-CM

## 2025-03-13 PROCEDURE — 1126F AMNT PAIN NOTED NONE PRSNT: CPT

## 2025-03-13 PROCEDURE — 99213 OFFICE O/P EST LOW 20 MIN: CPT

## 2025-03-13 PROCEDURE — 3078F DIAST BP <80 MM HG: CPT

## 2025-03-13 PROCEDURE — 3074F SYST BP LT 130 MM HG: CPT

## 2025-03-13 PROCEDURE — 3008F BODY MASS INDEX DOCD: CPT

## 2025-03-13 PROCEDURE — 1159F MED LIST DOCD IN RCRD: CPT

## 2025-03-13 PROCEDURE — 1160F RVW MEDS BY RX/DR IN RCRD: CPT

## 2025-03-13 RX ORDER — TRIAMCINOLONE ACETONIDE 5 MG/G
1 CREAM TOPICAL 2 TIMES DAILY PRN
Qty: 15 G | Refills: 1 | Status: SHIPPED | OUTPATIENT
Start: 2025-03-13

## 2025-03-13 RX ORDER — PROPRANOLOL HYDROCHLORIDE 120 MG/1
120 CAPSULE, EXTENDED RELEASE ORAL DAILY
COMMUNITY
Start: 2024-07-19 | End: 2025-07-14

## 2025-03-13 RX ORDER — PRIMIDONE 50 MG/1
50 TABLET ORAL NIGHTLY
COMMUNITY
Start: 2024-07-19 | End: 2025-07-14

## 2025-03-13 NOTE — PROGRESS NOTES
Subjective:   Kimberlyn Marcial is a 74 year old female who presents for Dryness (Pt states she was diagnosed with eczema and she has been having a flare up on her face about 2 weeks ago she states she put cream and reliefs it but she states when it flares up its painful, swollen, red, itchy and cracked skin. ).     Patient complains of a localized skin rash located on the: eyelids and around her mouth  Patient describes the rash as: itching and redness.   The rash has been occurring/present for: 2 weeks.      Patient has history of dermatitis.       The patient has tried otc creams and she used an old triamcinolone cream that she had at home for control of these symptoms. These medications offer moderate relief of symptoms.       History/Other:      Chief Complaint Reviewed and Verified  Nursing Notes Reviewed and   Verified  Tobacco Reviewed  Allergies Reviewed  Medications Reviewed    Problem List Reviewed  Medical History Reviewed  Surgical History   Reviewed  OB Status Reviewed  Family History Reviewed  Social History   Reviewed           Tobacco:  She smoked tobacco in the past but quit greater than 12 months ago.  Social History     Tobacco Use   Smoking Status Former    Current packs/day: 0.00    Types: Cigarettes    Start date: 1967    Quit date: 1973    Years since quittin.2   Smokeless Tobacco Never          Current Outpatient Medications   Medication Sig Dispense Refill    Propranolol HCl  MG Oral Capsule SR 24 Hr Take 1 capsule (120 mg total) by mouth daily.      primidone 50 MG Oral Tab Take 1 tablet (50 mg total) by mouth nightly.      triamcinolone 0.5 % External Cream Apply 1 Application topically 2 (two) times daily as needed (itching). 15 g 1    topiramate 25 MG Oral Tab Take 1 tablet (25 mg total) by mouth daily. 90 tablet 3    atorvastatin 40 MG Oral Tab Take 1 tablet (40 mg total) by mouth nightly.      amitriptyline 25 MG Oral Tab Take 1 tablet (25 mg total) by mouth  nightly. 90 tablet 3    omeprazole 20 MG Oral Capsule Delayed Release Take 1 capsule (20 mg total) by mouth before breakfast. 90 capsule 3    Spacer/Aero-Holding Chambers Does not apply Device Use inhaler with Albuterol as needed 1 each 0    Multiple Vitamins-Minerals (MULTIVITAMIN ADULTS) Oral Tab Take 1 tablet by mouth daily.      Ergocalciferol (VITAMIN D OR) Take 400 Units by mouth daily.      Mirabegron ER (MYRBETRIQ) 25 MG Oral Tablet 24 Hr Take 1 tablet (25 mg total) by mouth daily. (Patient not taking: Reported on 3/13/2025) 90 tablet 3       Allergies[1]         Review of Systems   Constitutional: Negative.  Negative for activity change and fatigue.   HENT: Negative.  Negative for congestion, ear pain, rhinorrhea and sneezing.    Eyes: Negative.  Negative for redness.   Respiratory: Negative.  Negative for cough, shortness of breath and wheezing.    Cardiovascular: Negative.  Negative for chest pain.   Gastrointestinal: Negative.  Negative for abdominal pain, constipation, diarrhea, nausea and vomiting.   Endocrine: Negative.    Genitourinary: Negative.  Negative for difficulty urinating and frequency.   Musculoskeletal: Negative.  Negative for back pain, joint swelling and myalgias.   Skin:  Positive for rash.   Allergic/Immunologic: Negative.    Neurological: Negative.  Negative for dizziness, syncope, light-headedness and headaches.   Hematological: Negative.    Psychiatric/Behavioral: Negative.           Objective:   /72 (BP Location: Right arm, Patient Position: Sitting, Cuff Size: adult)   Pulse 58   Ht 5' 3\" (1.6 m)   Wt 127 lb (57.6 kg)   SpO2 100%   BMI 22.50 kg/m²  Estimated body mass index is 22.5 kg/m² as calculated from the following:    Height as of this encounter: 5' 3\" (1.6 m).    Weight as of this encounter: 127 lb (57.6 kg).      Physical Exam  Vitals and nursing note reviewed.   Constitutional:       Appearance: Normal appearance. She is normal weight.   HENT:      Head:  Normocephalic and atraumatic.      Right Ear: Tympanic membrane normal.      Left Ear: Tympanic membrane normal.      Nose: Nose normal.      Mouth/Throat:      Mouth: Mucous membranes are moist.      Pharynx: Oropharynx is clear.   Eyes:      Extraocular Movements: Extraocular movements intact.      Conjunctiva/sclera: Conjunctivae normal.      Pupils: Pupils are equal, round, and reactive to light.   Cardiovascular:      Rate and Rhythm: Normal rate and regular rhythm.      Pulses: Normal pulses.      Heart sounds: Normal heart sounds.   Pulmonary:      Effort: Pulmonary effort is normal.      Breath sounds: Normal breath sounds.   Abdominal:      General: Abdomen is flat. Bowel sounds are normal.      Palpations: Abdomen is soft.   Musculoskeletal:         General: Normal range of motion.      Cervical back: Normal range of motion and neck supple.   Skin:     General: Skin is warm and dry.      Findings: Ecchymosis present.      Comments: Around bilateral eyelids and mouth.    Neurological:      General: No focal deficit present.      Mental Status: She is alert and oriented to person, place, and time. Mental status is at baseline.   Psychiatric:         Mood and Affect: Mood normal.         Behavior: Behavior normal.         Thought Content: Thought content normal.         Judgment: Judgment normal.           Assessment & Plan:     Assessment & Plan  Dermatitis  -Take medication as directed  -Discussed otc treatments.   -Advised about oatmeal baths, no hot showers, and keeping showers to a maximum of 10 minutes  -No more than 1 shower a day  -Pad yourself dry/no wiping   -Use Dove sensitive skin bar soap, Eucerin moisturizer after showers  -Advised not to use any new products  -Educated to call if worse or not better in 1-2 weeks, will consider follow up with dermatology if not resolved.  -Education material printed out for patient   Orders:    triamcinolone 0.5 % External Cream; Apply 1 Application topically 2  (two) times daily as needed (itching).    Derm Referral - In Network     Medication use, effects and side effects discussed in detail with patient. The patient indicated understanding of the diagnosis and agreed with the plan of care.    Return in about 2 weeks (around 3/27/2025).    VIVIANA Del Valle       [1] No Known Allergies

## 2025-03-14 ENCOUNTER — OFFICE VISIT (OUTPATIENT)
Age: 75
End: 2025-03-14
Payer: COMMERCIAL

## 2025-03-14 ENCOUNTER — TELEPHONE (OUTPATIENT)
Dept: FAMILY MEDICINE CLINIC | Facility: CLINIC | Age: 75
End: 2025-03-14

## 2025-03-14 DIAGNOSIS — R25.1 TREMORS OF NERVOUS SYSTEM: Primary | ICD-10-CM

## 2025-03-14 DIAGNOSIS — M47.812 CERVICAL SPONDYLOSIS: Primary | ICD-10-CM

## 2025-03-14 DIAGNOSIS — R29.898 HAND WEAKNESS: ICD-10-CM

## 2025-03-14 DIAGNOSIS — M54.2 CERVICALGIA: ICD-10-CM

## 2025-03-14 DIAGNOSIS — M54.12 CERVICAL RADICULOPATHY: ICD-10-CM

## 2025-03-14 PROCEDURE — 1159F MED LIST DOCD IN RCRD: CPT | Performed by: PHYSICIAN ASSISTANT

## 2025-03-14 PROCEDURE — 99214 OFFICE O/P EST MOD 30 MIN: CPT | Performed by: PHYSICIAN ASSISTANT

## 2025-03-14 PROCEDURE — 1160F RVW MEDS BY RX/DR IN RCRD: CPT | Performed by: PHYSICIAN ASSISTANT

## 2025-03-14 PROCEDURE — 1125F AMNT PAIN NOTED PAIN PRSNT: CPT | Performed by: PHYSICIAN ASSISTANT

## 2025-03-14 NOTE — TELEPHONE ENCOUNTER
Patient contacted. Provided new neurology information.  Call transferred to Neurology department.

## 2025-03-14 NOTE — TELEPHONE ENCOUNTER
Patient requesting to see a new Neurologist.  Patient was seeing [facility] provider Anna Harper PA-C.  Patient states her tremors are getting worse and eye issues.  She is currently taking propranolol, amitriptyline, and topiramate.  Patient last saw neurologist 4 months ago. Verified insurance.  Please advise. Neurology referral pended.  Please add provider name

## 2025-03-14 NOTE — PROGRESS NOTES
Patient: Kimberlyn Marcial  Medical Record Number: [id number]  Site: [facility]  Referring Physician:  No ref. provider found  PCP: Curtis Peterson MD       HISTORY OF CHIEF COMPLAINT:    Kimberlyn Marcial is a 74 year old female, who complains of 1 month h/o bilateral radiating neck pain.  Denies difficulty walking.  Patient has neck pain to the left trapezius into her shoulder.  She has numbness in bilateral hands.  She is dropping objects.  She feels weak with her hands.  This all started roughly a month ago.  No injury.  She went to the emergency room and Toradol was given.  They also sent her home with naproxen.  She states her neck is feeling slightly better but she still has some clumsiness with her hands.  She has been working with a chiropractor.    VAS Pain Score: 7 /10      Past Medical History:    Allergic dermatitis    Depression    Dyschromia    Hiatal hernia    History of anxiety    per Next[employer]:     Osteoarthritis    Osteoarthrosis    Radial styloid tenosynovitis    Rheumatoid arthritis (HCC)    Sinus tachycardia    neg stress, EKG normal    Spondylolisthesis    L4-L5    Tremors of nervous system    Vocal cord polyp      Past Surgical History:   Procedure Laterality Date    Back surgery  09/01/2011    Posterior spinal fusion and  instrumentation minimal invasive at L4-L5 with laminectomy, L4-L5 posterolateral fusion. Dr. He Ovalles    Breast reconstruction      Breast surgery Bilateral 2013    for cosmetic reason    Colonoscopy  07/2020    Colonoscopy N/A 07/28/2020    Procedure: COLONOSCOPY, POSSIBLE BIOPSY, POSSIBLE POLYPECTOMY 73216;  Surgeon: Robe Gutierres MD;  Location: [facility]    Hysterectomy  1991    fibroids    Knee arthroscopy Right 2004    Knee arthroscopy Left 2004 & 2000    arthroscope x2    Oophorectomy Bilateral 10/29/2001    salpingo-oophorectomy. Dr. Shayy Galeas    Other surgical history  06/22/2004    Procedure for prolapsed hemorrhoids; both external and  internal complicated hemorrhoids. Dr. EJ Villar      Family History   Problem Relation Age of Onset    Cancer Father         abdominal    Arthritis Mother     Cancer Mother         ovarian    Ovarian Cancer Mother         36    Cancer Paternal Uncle         brain    Ovarian Cancer Sister         24      Social History     Socioeconomic History    Marital status:    Tobacco Use    Smoking status: Former     Current packs/day: 0.00     Types: Cigarettes     Start date: 1967     Quit date: 1973     Years since quittin.2    Smokeless tobacco: Never   Vaping Use    Vaping status: Never Used   Substance and Sexual Activity    Alcohol use: Yes     Comment: 1-2 drinks occasionally, 3-4 times per year.    Drug use: No   Other Topics Concern    Caffeine Concern Yes     Comment: 1-2 large cups coffee daily      Current Medications:  Current Outpatient Medications   Medication Sig Dispense Refill    Propranolol HCl  MG Oral Capsule SR 24 Hr Take 1 capsule (120 mg total) by mouth daily.      primidone 50 MG Oral Tab Take 1 tablet (50 mg total) by mouth nightly.      triamcinolone 0.5 % External Cream Apply 1 Application topically 2 (two) times daily as needed (itching). 15 g 1    topiramate 25 MG Oral Tab Take 1 tablet (25 mg total) by mouth daily. 90 tablet 3    atorvastatin 40 MG Oral Tab Take 1 tablet (40 mg total) by mouth nightly.      amitriptyline 25 MG Oral Tab Take 1 tablet (25 mg total) by mouth nightly. 90 tablet 3    omeprazole 20 MG Oral Capsule Delayed Release Take 1 capsule (20 mg total) by mouth before breakfast. 90 capsule 3    Mirabegron ER (MYRBETRIQ) 25 MG Oral Tablet 24 Hr Take 1 tablet (25 mg total) by mouth daily. 90 tablet 3    Spacer/Aero-Holding Chambers Does not apply Device Use inhaler with Albuterol as needed 1 each 0    Multiple Vitamins-Minerals (MULTIVITAMIN ADULTS) Oral Tab Take 1 tablet by mouth daily.      Ergocalciferol (VITAMIN D OR) Take 400 Units by mouth daily.               IMAGING STUDIES:  X-rays were reviewed with the patient today  X-rays  Images were reviewed and discussed with the patient.  They voiced understanding of what the images showed.  CT BRAIN OR HEAD (CPT=70450)    Result Date: 2/28/2025  PROCEDURE: CT BRAIN OR HEAD (CPT=70450)  COMPARISON: None.  INDICATIONS: Right-sided headache and neck pain.  TECHNIQUE: CT images were obtained without contrast material.  Automated exposure control for dose reduction was used.  Dose information is transmitted to the ACR (American College of Radiology) NRDR (National Radiology Data Registry) which includes the Dose Index Registry.  FINDINGS:  CEREBRUM: Mild decreased attenuation in periventricular white matter and centrum semiovale.  No edema, hemorrhage, mass or acute infarct. CEREBELLUM: No edema, hemorrhage, mass or acute infarct. BRAINSTEM: No edema, hemorrhage, mass or acute infarct. CSF SPACES: No hydrocephalus, subarachnoid hemorrhage, or mass.  SKULL: Skull base and calvarium are intact. SINUSES: Partial visualization of opacified right maxillary sinus.  Mild chronic mucosal thickening in sphenoid sinus..  ORBITS: Limited views are unremarkable.  OTHER: Atherosclerotic calcification cavernous carotid arteries.         CONCLUSION:  1. No acute intracranial finding. 2. Mild changes of chronic small vessel disease in cerebral white matter. 3. Right maxillary sinusitis may be acute or chronic.    Dictated by (CST): Gabe Cornejo MD on 2/28/2025 at 5:50 PM     Finalized by (CST): Gabe Cornejo MD on 2/28/2025 at 5:52 PM          CT SPINE CERVICAL (CPT=72125)    Result Date: 2/28/2025  PROCEDURE: CT SPINE CERVICAL (CPT=72125)  COMPARISON: None.  INDICATIONS: Patient is having head and neck pain for the past week no nausea or vomiting  TECHNIQUE: Multidetector CT images of the cervical spine were obtained without the infusion of non-ionic intravenous contrast material. Automated exposure control for dose reduction was  used. Adjustment of the mA and/or kV was done based on the patient's  size. Iterative reconstruction technique for dose reduction was employed.   FINDINGS: ALIGNMENT: The anatomic cervical lordosis is preserved.  Mild to moderate dextroscoliosis. BONES: No fractures or osseous lesions are apparent. DISCS: Advanced multilevel cervical spine degenerative changes.  Resultant severe right and moderate left neural foraminal stenoses at the C4-C5 and C5-C6 levels. CRANIOCERVICAL AREA: Normal foramen magnum without Chiari malformation.  PARASPINAL AREA: No visible mass.  OTHER: There is no visible swelling of the prevertebral soft tissues.  Trace right carotid bifurcation atherosclerosis.  There is a right palatine tonsillith.  The imaged lung apices are clear.         CONCLUSION:  1. No acute fracture or traumatic subluxation of the cervical spine. 2. Advanced multilevel cervical spine degenerative changes.  Findings are most pronounced at C4-C5 and C5-C6 with moderate to severe right neural foraminal stenosis at both of these levels. 3. Mild-to-moderate dextroscoliosis of the cervical spine. 4. Lesser incidental findings as above.   Dictated by (CST): Sarath Monet MD on 2/28/2025 at 5:44 PM     Finalized by (CST): Sarath Monet MD on 2/28/2025 at 5:47 PM                 PHYSICAL EXAMIMATION   PHYSICAL EXAMINATION: Kimberlyn Marcial is a 74 year old   female who is observed sitting comfortably in the exam room alert and oriented times three. RESPIRATORY RATE: 18 She looks consistent her stated age.    There were no vitals taken for this visit.  The patient is well developed, well nourished, thin body habitus, well muscled.       Inspection: No acute distress.   Patient displays antalgic gait, and is able to normal heel walk, normal toe walk.     Coordination: Well coordinated, Fluid gait    Cervical spine:  Patient is A&O X 3 and in no apparent distress.   Neck is soft and supple with no masses or lymphadenopathy  palpated.  Mildly tender to palpation over the para spinals and C7 spinous process  More tender to the left trapezius  Skin intact with no erythema, edema or ecchymosis noted  pain with left rotation of neck  Spulrlings Maneuver is negative  Osman's Maneuver is negative bilaterally  Smooth pain free ROM to bilateral wrist, elbow; no shoulder pain  Sensation is intact to all dermatome patterns of bilateral upper extremities  Tinel's is negative to bilateral cubital and carpal tunnels. negative Phalen's    Strength testing:   Left   Right   5/5 Deltoid  5/5 Deltoid    5/5 Biceps  5/5 Biceps   5/5 Triceps  5/5 Triceps    5/5 Ext Rot  5/5 Ext Rot   5/5 Wrist Ext  5/5 Wrist Ext   4-/5 Intrinsics  5/5 Intrinsics  Atrophy noted to the left hand  DTR  Left   Right   2+Biceps  2+ Biceps   2+Triceps  2+Triceps   2+Brichoradialis  2+Brachioradialis    Bilateral Lower Extremitis are negative for clonus                                                                                       MEDICAL DECISION MAKING:   Impression: Cervical spondylosis   Cervical stenosis  Weakness to the hands  Dropping objects  Bilateral hand numbness    Plan: We discussed the diagnosis and treatment options today, including rationale for surgical vs non-surgical options.  The patient will go for evaluation by Dr. Behar for possible trigger point injection into the left trapezius.  We did review her CT and she does have spondylosis with stenosis.  I am concerned by the fact she feels clumsy with her hands.  I would like to order an MRI to rule out myelopathy.  She does show stenosis on her central canal but it is difficult to evaluate the severity of her stenosis.  She will start physical therapy at this time.  Her concerns were addressed with this.  She does have weakness of the left hand along with atrophy of the left hand.  Restrictions and limitations were reviewed with the patient.  Concerns were addressed.  Questions were encouraged and  answered.  Patient voiced understanding.  Images were reviewed and discussed with the patient.  They voiced understanding of what the images showed.      The patient indicates understanding of these issues and agrees to the plan.    Thank you very much.     Respectfully yours,    Emeka Martinez PA-C    This note was dictated using Dragon software. While it was briefly proofread prior to completion, some grammatical, spelling, and word choice errors due to dictation may still occur.

## 2025-04-09 ENCOUNTER — HOSPITAL ENCOUNTER (OUTPATIENT)
Dept: MRI IMAGING | Facility: HOSPITAL | Age: 75
Discharge: HOME | End: 2025-04-09
Attending: PHYSICIAN ASSISTANT
Payer: MEDICARE

## 2025-04-09 DIAGNOSIS — M47.812 CERVICAL SPONDYLOSIS: ICD-10-CM

## 2025-04-09 DIAGNOSIS — M54.12 CERVICAL RADICULOPATHY: ICD-10-CM

## 2025-04-09 DIAGNOSIS — M54.2 CERVICALGIA: ICD-10-CM

## 2025-04-09 DIAGNOSIS — R29.898 HAND WEAKNESS: ICD-10-CM

## 2025-04-09 PROCEDURE — 72141 MRI NECK SPINE W/O DYE: CPT | Performed by: PHYSICIAN ASSISTANT

## 2025-04-10 ENCOUNTER — OFFICE VISIT (OUTPATIENT)
Dept: PHYSICAL MEDICINE AND REHAB | Facility: CLINIC | Age: 75
End: 2025-04-10
Payer: COMMERCIAL

## 2025-04-10 VITALS
HEIGHT: 63 IN | OXYGEN SATURATION: 98 % | DIASTOLIC BLOOD PRESSURE: 64 MMHG | BODY MASS INDEX: 22.5 KG/M2 | WEIGHT: 127 LBS | SYSTOLIC BLOOD PRESSURE: 108 MMHG | HEART RATE: 48 BPM

## 2025-04-10 DIAGNOSIS — M48.02 CERVICAL STENOSIS OF SPINAL CANAL: ICD-10-CM

## 2025-04-10 DIAGNOSIS — G89.29 CHRONIC LEFT SHOULDER PAIN: ICD-10-CM

## 2025-04-10 DIAGNOSIS — M47.812 CERVICAL FACET SYNDROME: ICD-10-CM

## 2025-04-10 DIAGNOSIS — M48.02 FORAMINAL STENOSIS OF CERVICAL REGION: ICD-10-CM

## 2025-04-10 DIAGNOSIS — M50.30 DDD (DEGENERATIVE DISC DISEASE), CERVICAL: ICD-10-CM

## 2025-04-10 DIAGNOSIS — R20.0 NUMBNESS AND TINGLING IN LEFT HAND: ICD-10-CM

## 2025-04-10 DIAGNOSIS — M54.2 TRIGGER POINT OF NECK: Primary | ICD-10-CM

## 2025-04-10 DIAGNOSIS — G56.02 CARPAL TUNNEL SYNDROME OF LEFT WRIST: ICD-10-CM

## 2025-04-10 DIAGNOSIS — M25.512 CHRONIC LEFT SHOULDER PAIN: ICD-10-CM

## 2025-04-10 DIAGNOSIS — R20.2 NUMBNESS AND TINGLING IN LEFT HAND: ICD-10-CM

## 2025-04-10 PROCEDURE — 1159F MED LIST DOCD IN RCRD: CPT | Performed by: PHYSICAL MEDICINE & REHABILITATION

## 2025-04-10 PROCEDURE — 99204 OFFICE O/P NEW MOD 45 MIN: CPT | Performed by: PHYSICAL MEDICINE & REHABILITATION

## 2025-04-10 PROCEDURE — 3008F BODY MASS INDEX DOCD: CPT | Performed by: PHYSICAL MEDICINE & REHABILITATION

## 2025-04-10 PROCEDURE — 3078F DIAST BP <80 MM HG: CPT | Performed by: PHYSICAL MEDICINE & REHABILITATION

## 2025-04-10 PROCEDURE — 1160F RVW MEDS BY RX/DR IN RCRD: CPT | Performed by: PHYSICAL MEDICINE & REHABILITATION

## 2025-04-10 PROCEDURE — 1125F AMNT PAIN NOTED PAIN PRSNT: CPT | Performed by: PHYSICAL MEDICINE & REHABILITATION

## 2025-04-10 PROCEDURE — 3074F SYST BP LT 130 MM HG: CPT | Performed by: PHYSICAL MEDICINE & REHABILITATION

## 2025-04-10 NOTE — H&P
[facility]  Clinic H&P    Requesting Physician: Curtis Peterson MD    CHIEF COMPLAINT:    Chief Complaint   Patient presents with    New Patient     New R handed pt presents with neck pain. Reports sharp pain when rotating head. Denies injury. Reports pain started 6 weeks ago. Pain is 3/10. Denies N/T or weakness. Denies taking medications. Denies PT. Reports injection 6 weeks ago. Denies surgeries. MRI C-spine 4/9/25, CT C-spine 2/28/25       History of Present Illness  Kimberlyn Marcial is a 74 year old female who presents with neck pain.    She has been experiencing severe neck pain for approximately six weeks, which began suddenly without any specific injury or fall. Initially, the pain was so intense that she was unable to move, describing it as unlike any pain she had felt before, persisting day and night. Currently, she rates the pain as a 2 out of 10, indicating significant improvement, which she attributes to exercises she has been performing. She received a single injection of Toradol for pain relief during an emergency visit and has not undergone any other injections or epidural treatments for her neck pain.    An MRI conducted recently revealed cervical stenosis, indicating a narrowing of the spinal canal at certain levels. No radiation of the pain to her arms, but she reports significant numbness in her hands, particularly on the left side, and experiences frequent dropping of objects.    She has a history of carpal tunnel syndrome, having undergone surgery twice on one hand and once on the other. She experiences tingling sensations when pressure is applied to her wrists, consistent with carpal tunnel symptoms.       PAST MEDICAL HISTORY:  Past Medical History[1]    SURGICAL HISTORY:  Past Surgical History[2]    SOCIAL HISTORY:   Social History     Occupational History    Not on file   Tobacco Use    Smoking status: Former     Current packs/day: 0.00     Types:  Cigarettes     Start date: 1967     Quit date: 1973     Years since quittin.3    Smokeless tobacco: Never   Vaping Use    Vaping status: Never Used   Substance and Sexual Activity    Alcohol use: Yes     Comment: 1-2 drinks occasionally, 3-4 times per year.    Drug use: No    Sexual activity: Not on file       FAMILY HISTORY:   Family History[3]    CURRENT MEDICATIONS:   Current Medications[4]    ALLERGIES:   Allergies[5]    REVIEW OF SYSTEMS:   Review of Systems   Constitutional: Negative.    HENT: Negative.    Eyes: Negative.    Respiratory: Negative.    Cardiovascular: Negative.    Gastrointestinal: Negative.    Genitourinary: Negative.    Musculoskeletal: As per HPI   Skin: Negative.    Neurological: As per HPI  Endo/Heme/Allergies: Negative.    Psychiatric/Behavioral: Negative.      All other systems reviewed and are negative. Pertinent positives and negatives noted in the HPI.      PHYSICAL EXAM:   /64 (BP Location: Right arm, Patient Position: Sitting, Cuff Size: adult)   Pulse (!) 48   Ht 63\"   Wt 127 lb (57.6 kg)   SpO2 98%   BMI 22.50 kg/m²     Body mass index is 22.5 kg/m².    General: No immediate distress  Head: Normocephalic/ Atraumatic  Eyes: Extra-occular movements intact.   Ears: No auricular hematoma or deformities  Mouth: No lesions or ulcerations  Heart: peripheral pulses intact. Normal capillary refill.   Lungs: Non-labored respirations  Abdomen: No abdominal guarding  Extremities: No lower extremity edema bilaterally   Skin: No lesions noted.   Cognition: alert & oriented x 3, attentive, able to follow 2 step commands, comprehension intact, spontaneous speech intact  Motor:    Musculoskeletal:    CERVICAL SPINE:  Inspection: no erythema, swelling, or obvious deformity  Palpation: Tender to palpation over the bilateral cervical facets and paraspinals  ROM: intact to all planes of motion of cervical spine including side-bend bilaterally, rotation bilaterally, flexion, and  extension   Strength: 5/5 in all myotomes of the BILATERAL upper extremities except 4 out of 5 during left shoulder abduction, wrist extension, and elbow extension)  Sensation: Intact to light touch in all dermatomes of the BILATERAL upper extremities except decrease sensation to light touch over the left C6, C7, and C8 dermatomes  Reflexes: 1/4 at C5, C6, C7 with a negative Osman's sign  Spurling Test: negative for radicular symptoms down either extremity bilaterally        Gait:  normal    Data  Office Visit on 12/11/2024   Component Date Value Ref Range Status    Glucose Urine 12/11/2024 Negative  Negative mg/dL Final    Bilirubin Urine 12/11/2024 Negative  Negative Final    Ketones, UA 12/11/2024 Negative  Negative - Trace mg/dL Final    Spec Gravity 12/11/2024 1.020  1.005 - 1.030 Final    Blood Urine 12/11/2024 Small (A)  Negative Final    PH Urine 12/11/2024 6.5  5.0 - 8.0 Final    Protein Urine 12/11/2024 100 (A)  Negative - Trace mg/dL Final    Urobilinogen Urine 12/11/2024 0.2  0.2 - 1.0 mg/dL Final    Nitrite Urine 12/11/2024 Positive (A)  Negative Final    Leukocyte Esterase Urine 12/11/2024 Large (A)  Negative Final    APPEARANCE 12/11/2024 cloudy  Clear Final    Color Urine 12/11/2024 yellow  Yellow Final    Multistix Lot# 12/11/2024 [id number]  Numeric Final    Multistix Expiration Date 12/11/2024 12/31/2025  Date Final    Urine Culture 12/11/2024 >100,000 CFU/ML Escherichia coli (A)   Final   ]    Radiology Imaging:  I reviewed with the patient her CT and MRI of the cervical spine   PROCEDURE: CT SPINE CERVICAL (CPT=72125)     COMPARISON: None.     INDICATIONS: Patient is having head and neck pain for the past week no nausea or vomiting     TECHNIQUE: Multidetector CT images of the cervical spine were obtained without the infusion of non-ionic intravenous contrast material. Automated exposure control for dose reduction was used. Adjustment of the mA and/or kV was done based on the patient's   size.  Iterative reconstruction technique for dose reduction was employed.       FINDINGS:  ALIGNMENT: The anatomic cervical lordosis is preserved.  Mild to moderate dextroscoliosis.  BONES: No fractures or osseous lesions are apparent.  DISCS: Advanced multilevel cervical spine degenerative changes.  Resultant severe right and moderate left neural foraminal stenoses at the C4-C5 and C5-C6 levels.  CRANIOCERVICAL AREA: Normal foramen magnum without Chiari malformation.    PARASPINAL AREA: No visible mass.    OTHER: There is no visible swelling of the prevertebral soft tissues.  Trace right carotid bifurcation atherosclerosis.  There is a right palatine tonsillith.  The imaged lung apices are clear.               Impression   CONCLUSION:  1. No acute fracture or traumatic subluxation of the cervical spine.  2. Advanced multilevel cervical spine degenerative changes.  Findings are most pronounced at C4-C5 and C5-C6 with moderate to severe right neural foraminal stenosis at both of these levels.  3. Mild-to-moderate dextroscoliosis of the cervical spine.  4. Lesser incidental findings as above.        Dictated by (CST): Sarath Monet MD on 2/28/2025 at 5:44 PM      Finalized by (CST): Sarath Monet MD on 2/28/2025 at 5:47 PM       ASSESSMENT AND PLAN:  The patient is a pleasant 74-year-old female presents with bilateral neck pain without radicular symptoms although with weakness during multiple myotomes of the left upper extremity and decreased sensation at the left C6, C7, and C8 dermatomes.  I believe her neck pain is due to cervical spondylosis with facet arthropathy along with central stenosis primarily affecting C4-C7.  I am recommending she start formal physical therapy.  If her symptoms do not improve with this, then I would recommend bilateral cervical facet joint injections.  She should use Tylenol for pain.  I have reviewed her CT scan as well as MRI of the cervical spine.  As a pertains to the numbness and  tingling in the left hand, she does have a positive Tinel's sign on the left.  I am recommending she use a resting wrist splint given her history of carpal tunnel syndrome with a release in the past.  I will see her again in about 2 months.     Assessment & Plan  Cervical Spinal Stenosis  Cervical spinal stenosis with neck pain improved from 10/10 to 2/10. MRI shows canal narrowing due to arthritis and degeneration. No arm pain.  - Initiate physical therapy at [facility] in [place].  - Recommend Tylenol as needed for pain.  - Consider joint injections if symptoms worsen or persist.    Carpal Tunnel Syndrome  Left hand numbness and tingling at night, suggestive of carpal tunnel syndrome. History of surgeries on both hands. Positive Tinel's sign.  - Use a carpal tunnel splint at night for the left hand.    RTC in 2 months  Discharge Instructions were provided as documented in AVS summary.  The patient was in agreement with the assessment and plan.  All questions were answered.  There were no barriers to learning.         1. Trigger point of neck    2. Cervical facet syndrome    3. Foraminal stenosis of cervical region    4. DDD (degenerative disc disease), cervical    5. Chronic left shoulder pain    6. Numbness and tingling in left hand    7. Carpal tunnel syndrome of left wrist    8. Cervical stenosis of spinal canal        Alex B. Behar MD  Physical Medicine and Rehabilitation/Sports Medicine  [facility]  [place] Cures Act Notice to Patient: Medical documents like this are made available to patients in the interest of transparency. However, be advised this is a medical document and it is intended as [id number] communication between your medical providers. This medical document may contain abbreviations, assessments, medical data, and results or other terms that are unfamiliar. Medical documents are intended to carry relevant information, facts as evident, and the  clinical opinion of the practitioner. As such, this medical document may be written in language that appears blunt or direct. You are encouraged to contact your medical provider and/or [facility] Experience if you have any questions about this medical document.   Abridge tool was used for dictation purposes only and the patient was not recorded at any point during the visit.          [1]   Past Medical History:   Allergic dermatitis    Depression    Dyschromia    Hiatal hernia    History of anxiety    per [employer]:     Osteoarthritis    Osteoarthrosis    Radial styloid tenosynovitis    Rheumatoid arthritis (HCC)    Sinus tachycardia    neg stress, EKG normal    Spondylolisthesis    L4-L5    Tremors of nervous system    Vocal cord polyp   [2]   Past Surgical History:  Procedure Laterality Date    Back surgery  09/01/2011    Posterior spinal fusion and  instrumentation minimal invasive at L4-L5 with laminectomy, L4-L5 posterolateral fusion. Dr. He Ovalles    Breast reconstruction      Breast surgery Bilateral 2013    for cosmetic reason    Colonoscopy  07/2020    Colonoscopy N/A 07/28/2020    Procedure: COLONOSCOPY, POSSIBLE BIOPSY, POSSIBLE POLYPECTOMY 53160;  Surgeon: Robe Gutierres MD;  [place] [facility] [place] [facility]    Hysterectomy  1991    fibroids    Knee arthroscopy Right 2004    Knee arthroscopy Left 2004 & 2000    arthroscope x2    Oophorectomy Bilateral 10/29/2001    salpingo-oophorectomy. Dr. Shayy Galeas    Other surgical history  06/22/2004    Procedure for prolapsed hemorrhoids; both external and internal complicated hemorrhoids. Dr. EJ Villar   [3]   Family History  Problem Relation Age of Onset    Cancer Father         abdominal    Arthritis Mother     Cancer Mother         ovarian    Ovarian Cancer Mother         36    Cancer Paternal Uncle         brain    Ovarian Cancer Sister         24   [4]   Current Outpatient Medications   Medication Sig Dispense Refill    Propranolol  HCl  MG Oral Capsule SR 24 Hr Take 1 capsule (120 mg total) by mouth daily.      primidone 50 MG Oral Tab Take 1 tablet (50 mg total) by mouth nightly.      triamcinolone 0.5 % External Cream Apply 1 Application topically 2 (two) times daily as needed (itching). 15 g 1    topiramate 25 MG Oral Tab Take 1 tablet (25 mg total) by mouth daily. 90 tablet 3    atorvastatin 40 MG Oral Tab Take 1 tablet (40 mg total) by mouth nightly.      amitriptyline 25 MG Oral Tab Take 1 tablet (25 mg total) by mouth nightly. 90 tablet 3    omeprazole 20 MG Oral Capsule Delayed Release Take 1 capsule (20 mg total) by mouth before breakfast. 90 capsule 3    Mirabegron ER (MYRBETRIQ) 25 MG Oral Tablet 24 Hr Take 1 tablet (25 mg total) by mouth daily. 90 tablet 3    Spacer/Aero-Holding Chambers Does not apply Device Use inhaler with Albuterol as needed 1 each 0    Multiple Vitamins-Minerals (MULTIVITAMIN ADULTS) Oral Tab Take 1 tablet by mouth daily.      Ergocalciferol (VITAMIN D OR) Take 400 Units by mouth daily.     [5] No Known Allergies

## 2025-04-10 NOTE — PATIENT INSTRUCTIONS
1) Begin physical therapy as soon as possible at [facility]  2) Tylenol 500-1000 mg every 6-8 hours as needed for pain.  No more than 3000 mg daily.  3) Follow-up with me in 6 to 8 weeks after you  begin physical therapy. If symptoms do not improve, then I would recommend facet joint injections   4) Use carpal tunnel splint at night for the left hand numbness

## 2025-04-14 DIAGNOSIS — K44.9 HIATAL HERNIA: ICD-10-CM

## 2025-04-15 ENCOUNTER — APPOINTMENT (OUTPATIENT)
Dept: PHYSICAL THERAPY | Age: 75
End: 2025-04-15
Attending: PHYSICIAN ASSISTANT
Payer: MEDICARE

## 2025-04-16 ENCOUNTER — TELEPHONE (OUTPATIENT)
Facility: CLINIC | Age: 75
End: 2025-04-16

## 2025-04-16 DIAGNOSIS — I65.29 STENOSIS OF CAROTID ARTERY, UNSPECIFIED LATERALITY: Primary | ICD-10-CM

## 2025-04-16 RX ORDER — OMEPRAZOLE 20 MG/1
20 CAPSULE, DELAYED RELEASE ORAL
Qty: 90 CAPSULE | Refills: 0 | Status: SHIPPED | OUTPATIENT
Start: 2025-04-16

## 2025-04-16 NOTE — TELEPHONE ENCOUNTER
Emeka Martinez, Tammy Hanley RN  Results have been reviewed. Please have PCP review to determine what additional imaging is needed based on the 7th conclusion in report.    Called and spoke w/ patient.   Advised patient of PA-C recommendation. Patient verbalized understanding and agreement.   Patient will contact PCP office also.     MRI cervical spine 4/9/25 [facility] EP[facility]  \"7. Abnormal appearance of the right petrous, cavernous, and supraclinoid segments of the internal carotid artery flow voids.  These findings are likely secondary to flow-limiting stenosis or occlusion.  Recommend comparison with prior intracranial   vascular imaging.\"

## 2025-04-16 NOTE — TELEPHONE ENCOUNTER
Patient calling back about MRI results. Please advise.    Pt left without being see as no lab available in Saturday clinic

## 2025-04-16 NOTE — TELEPHONE ENCOUNTER
Dr. Ricardo     Please see messages below concerning MRI results     This is a former Dr Peterson patient    Please reply to pool: EM RN TRIAGE

## 2025-04-17 ENCOUNTER — APPOINTMENT (OUTPATIENT)
Dept: PHYSICAL THERAPY | Age: 75
End: 2025-04-17
Attending: PHYSICIAN ASSISTANT
Payer: MEDICARE

## 2025-04-17 NOTE — TELEPHONE ENCOUNTER
Please call patient to make an appointment.  Thank you    Was patient of Dr. Peterson, needs to establish.     Refill passed per [employer] protocols.    Requested Prescriptions   Pending Prescriptions Disp Refills    OMEPRAZOLE 20 MG Oral Capsule Delayed Release [Pharmacy Med Name: OMEPRAZOLE 20MG CAPSULES] 90 capsule 0     Sig: TAKE 1 CAPSULE(20 MG) BY MOUTH BEFORE BREAKFAST       Gastrointestional Medication Protocol Passed - 4/16/2025  7:23 PM

## 2025-04-17 NOTE — TELEPHONE ENCOUNTER
Dr. Ricardo please advise, referral to [facility] pended for approval. Needs diagnosis, thank you.

## 2025-04-17 NOTE — TELEPHONE ENCOUNTER
Recommend patient see a cardiologist for further evaluation.    Theodora Ricardo MD, 04/16/25, 9:09 PM

## 2025-04-22 ENCOUNTER — APPOINTMENT (OUTPATIENT)
Dept: PHYSICAL THERAPY | Age: 75
End: 2025-04-22
Attending: PHYSICIAN ASSISTANT
Payer: MEDICARE

## 2025-04-24 ENCOUNTER — APPOINTMENT (OUTPATIENT)
Dept: PHYSICAL THERAPY | Age: 75
End: 2025-04-24
Attending: PHYSICIAN ASSISTANT
Payer: MEDICARE

## 2025-04-29 ENCOUNTER — APPOINTMENT (OUTPATIENT)
Dept: PHYSICAL THERAPY | Age: 75
End: 2025-04-29
Attending: PHYSICIAN ASSISTANT
Payer: MEDICARE

## 2025-05-06 ENCOUNTER — OFFICE VISIT (OUTPATIENT)
Dept: NEUROLOGY | Facility: CLINIC | Age: 75
End: 2025-05-06
Payer: COMMERCIAL

## 2025-05-06 VITALS
HEIGHT: 63.5 IN | HEART RATE: 68 BPM | WEIGHT: 125 LBS | BODY MASS INDEX: 21.87 KG/M2 | DIASTOLIC BLOOD PRESSURE: 77 MMHG | RESPIRATION RATE: 16 BRPM | SYSTOLIC BLOOD PRESSURE: 136 MMHG

## 2025-05-06 DIAGNOSIS — G25.0 BENIGN ESSENTIAL TREMOR: Primary | ICD-10-CM

## 2025-05-06 PROBLEM — G51.4 EYELID MYOKYMIA: Status: ACTIVE | Noted: 2023-06-06

## 2025-05-06 PROBLEM — F41.9 ANXIETY: Status: ACTIVE | Noted: 2023-03-01

## 2025-05-06 PROBLEM — G20.C PARKINSONISM (HCC): Chronic | Status: ACTIVE | Noted: 2024-03-05

## 2025-05-06 PROBLEM — I65.21 INTERNAL CAROTID ARTERY STENOSIS, RIGHT: Status: ACTIVE | Noted: 2024-07-16

## 2025-05-06 PROBLEM — E78.5 HLD (HYPERLIPIDEMIA): Status: ACTIVE | Noted: 2024-07-16

## 2025-05-06 PROCEDURE — 3075F SYST BP GE 130 - 139MM HG: CPT | Performed by: INTERNAL MEDICINE

## 2025-05-06 PROCEDURE — 1159F MED LIST DOCD IN RCRD: CPT | Performed by: INTERNAL MEDICINE

## 2025-05-06 PROCEDURE — 3078F DIAST BP <80 MM HG: CPT | Performed by: INTERNAL MEDICINE

## 2025-05-06 PROCEDURE — 3008F BODY MASS INDEX DOCD: CPT | Performed by: INTERNAL MEDICINE

## 2025-05-06 PROCEDURE — G2211 COMPLEX E/M VISIT ADD ON: HCPCS | Performed by: INTERNAL MEDICINE

## 2025-05-06 PROCEDURE — 99204 OFFICE O/P NEW MOD 45 MIN: CPT | Performed by: INTERNAL MEDICINE

## 2025-05-06 PROCEDURE — 1160F RVW MEDS BY RX/DR IN RCRD: CPT | Performed by: INTERNAL MEDICINE

## 2025-05-06 NOTE — PROGRESS NOTES
[facility] NEUROSCIENCES [address], [address]  [facility] [address]  [phone]            Neurology Initial Visit     Referred By: Dr. Peterson    Chief Complaint:   Chief Complaint   Patient presents with    Neurologic Problem     Patient presents here today to establish care, patient was referred by Curtis Peterson MD to evaluate and treat Tremors of nervous system. Patient c/o bandar hand tremors, pain , weakness, numbness and cold hands, patient reports started physical therapy.  Patient gives verbal consent to use [facility].        History of Present Illness  Kimberlyn Marcial is a 74 year old female who presents with worsening tremors and balance issues.     She has experienced tremors in her hands for approximately five years, with progressive worsening over time. The tremors are bilateral but more pronounced in her left hand and worsen with activities requiring fine motor skills, such as eating soup. Emotional states like feeling 'mad' or 'sad' exacerbate the tremors.has followed with [facility] where she tried primidone and topiramate in the past with no improvement.  Currently taking propranolol and carbidopa-levodopa due to concerns that some of her tremor may have been related to Parkinson's.  She takes both of these in the morning, only 1 time a day.  She will frequently take only 1 or the other of these 2 medicines and never feels like she is worse off if she skips either of them.  Also takes amitriptyline at bedtime for sleep.  At [facility] they attempted to wean this to see if it helped improve her tremors and it caused her tremors to worsen.  She sleeps well.  Also has joint pain in both hands, when it flares she is unable to move the hands at all.  Tremor persists even when she is not having any joint pains.      She reports balance and walking issues, describing her movements as 'not too good' and her balance as 'off.' She feels her walking has slowed down over the  years.     No headaches or migraines are reported, and she experiences no side effects from her current medications.        Past Medical History[1]    Past Surgical History[2]    Social history:  History   Smoking Status    Former    Types: Cigarettes   Smokeless Tobacco    Never     History   Alcohol Use    Yes     Comment: 1-2 drinks occasionally, 3-4 times per year.     History   Drug Use No       Family History[3]    Medications - Current[4]    Allergies[5]    ROS:   As in HPI, the rest of the 14 system review was done and was negative      Physical Exam:  Vitals:    05/06/25 1038   BP: 136/77   Pulse: 68   Resp: 16   Weight: 125 lb (56.7 kg)   Height: 63.5\"       General: No apparent distress, well nourished, well groomed.  Head- Normocephalic, atraumatic  Eyes- No redness or swelling    Neurological:   Mental Status:  Mental Status- Alert, conversant, speech fluent, following all commands    Cranial Nerves:  II.- Visual fields full to confrontation, III, IV, VI- EOM intact, and VII. Face symmetric, no facial weakness    Motor Exam:  Strength- upper extremities 5/5 proximally and distally                - lower  extremities 5/5 proximally and distally  No bradykinesia, no cogwheel rigidity, no rest tremor      Sensory Exam:  Light touch- intact in all 4 extremities    Deep Tendon Reflexes:  Biceps 2+ bilateral symmetric  Triceps 2+ bilateral symmetric  Brachioradialis 2 + bilateral symmetric  Patellar 2+ bilateral symmetric  Ankle jerks 1+ bilateral symmetric    Coordination:  No dysmetria with finger to nose bilaterally  Barely visible action tremor on left.  \"No-no\" head tremor noted throughout exam    Gait:  Normal casual gait, has very slight decreased left arm swing and slight left arm tremor that comes out with walking    Labs:    Lab Results   Component Value Date    TSH 3.092 04/24/2024     Lab Results   Component Value Date    HDL 47 04/24/2024     (H) 04/24/2024    TRIG 85 04/24/2024     Lab  Results   Component Value Date    HGB 12.2 05/20/2024    HCT 39.0 05/20/2024    MCV 91.8 05/20/2024    WBC 10.2 05/20/2024    .0 05/20/2024      Lab Results   Component Value Date    BUN 14 05/20/2024    CA 9.2 05/20/2024    ALT 18 05/20/2024    AST 18 05/20/2024    ALKPHOS 99 08/02/2016    ALB 4.1 05/20/2024     (L) 05/20/2024    K 4.3 05/20/2024     05/20/2024    CO2 27.0 05/20/2024      I have reviewed labs.    Imaging Studies:  I have independently reviewed imaging.    Assessment & Plan  Essential tremor  Tremor affects hands, worsens with action, more pronounced in left hand.  Associated with no-no head tremor.  This pattern and exam findings are most suggestive of essential tremor. Emotional stress, RA flare,  and hypoglycemia may exacerbate.  - Continue propranolol, carbidopa-levodopa.  - Monitor tremor severity and potential triggers such as emotional stress, RA symptoms, and meal patterns.  - Implement behavioral modifications to manage emotional stress, such as deep breathing and relaxation techniques.  - Consider tracking medication adherence and its correlation with tremor severity.    ?  Parkinsonism  -Overall no major parkinsonism seen on exam today except for decreased arm swing when walking.  Examined after taking carbidopa levodopa this morning which could also be masking exam findings  -Continue carbidopa-levodopa, will monitor symptoms over time  - Also, prior [facility] records do note intracranial stenosis of the cavernous right ICA and mild chronic microvascular changes which could suggest mild vascular parkinsonism    Cavernous ICA stenosis  -Encouraged her to bring medication list to next visit, should be on ASA for intracranial atherosclerosis  -Continue atorvastatin     Education and counseling provided to patient. Instructed patient to call my office or seek medical attention immediately if symptoms worsen.  Patient verbalized understanding of information given. All questions  were answered. All side effects of drugs were discussed.     I have spent 48 min total time on the day of the encounter, including: Total time spent reasons:  Preparing to see the patient, Obtaining and/or reviewing separately obtained history, Performing a medically appropriate examination and/or evaluation, Counseling and educating the patient/family/caregiver, Ordering medications, tests, or procedures, Documenting clinical information in Epic, and Independently interpreting results and communicating results to the patient/family/caregiver      Return to clinic in: Return in about 3 months (around 8/6/2025).    Elaine Ibarra MD         [1]   Past Medical History:   Allergic dermatitis    Depression    Dyschromia    Hiatal hernia    History of anxiety    per [employer]:     Osteoarthritis    Osteoarthrosis    Radial styloid tenosynovitis    Rheumatoid arthritis (HCC)    Sinus tachycardia    neg stress, EKG normal    Spondylolisthesis    L4-L5    Tremors of nervous system    Vocal cord polyp   [2]   Past Surgical History:  Procedure Laterality Date    Back surgery  09/01/2011    Posterior spinal fusion and  instrumentation minimal invasive at L4-L5 with laminectomy, L4-L5 posterolateral fusion. Dr. He Ovalles    Breast reconstruction      Breast surgery Bilateral 2013    for cosmetic reason    Colonoscopy  07/2020    Colonoscopy N/A 07/28/2020    Procedure: COLONOSCOPY, POSSIBLE BIOPSY, POSSIBLE POLYPECTOMY 34082;  Surgeon: Robe Gutierres MD;  Location: [facility] [place]    Hysterectomy  1991    fibroids    Knee arthroscopy Right 2004    Knee arthroscopy Left 2004 & 2000    arthroscope x2    Oophorectomy Bilateral 10/29/2001    salpingo-oophorectomy. Dr. Shayy Galeas    Other surgical history  06/22/2004    Procedure for prolapsed hemorrhoids; both external and internal complicated hemorrhoids. Dr. EJ Villar   [3]   Family History  Problem Relation Age of Onset    Cancer Father         abdominal     Arthritis Mother     Cancer Mother         ovarian    Ovarian Cancer Mother         36    Cancer Paternal Uncle         brain    Ovarian Cancer Sister         24   [4]   Current Outpatient Medications:     omeprazole 20 MG Oral Capsule Delayed Release, Take 1 capsule (20 mg total) by mouth before breakfast., Disp: 90 capsule, Rfl: 0    Propranolol HCl  MG Oral Capsule SR 24 Hr, Take 1 capsule (120 mg total) by mouth daily., Disp: , Rfl:     topiramate 25 MG Oral Tab, Take 1 tablet (25 mg total) by mouth daily., Disp: 90 tablet, Rfl: 3    amitriptyline 25 MG Oral Tab, Take 1 tablet (25 mg total) by mouth nightly., Disp: 90 tablet, Rfl: 3    Mirabegron ER (MYRBETRIQ) 25 MG Oral Tablet 24 Hr, Take 1 tablet (25 mg total) by mouth daily., Disp: 90 tablet, Rfl: 3    Multiple Vitamins-Minerals (MULTIVITAMIN ADULTS) Oral Tab, Take 1 tablet by mouth daily., Disp: , Rfl:     Ergocalciferol (VITAMIN D OR), Take 400 Units by mouth daily., Disp: , Rfl:     primidone 50 MG Oral Tab, Take 1 tablet (50 mg total) by mouth nightly. (Patient not taking: Reported on 5/6/2025), Disp: , Rfl:     triamcinolone 0.5 % External Cream, Apply 1 Application topically 2 (two) times daily as needed (itching). (Patient not taking: Reported on 5/6/2025), Disp: 15 g, Rfl: 1    atorvastatin 40 MG Oral Tab, Take 1 tablet (40 mg total) by mouth nightly. (Patient not taking: Reported on 5/6/2025), Disp: , Rfl:     Spacer/Aero-Holding Chambers Does not apply Device, Use inhaler with Albuterol as needed (Patient not taking: Reported on 5/6/2025), Disp: 1 each, Rfl: 0  [5] No Known Allergies

## 2025-05-07 ENCOUNTER — MED REC SCAN ONLY (OUTPATIENT)
Dept: PHYSICAL MEDICINE AND REHAB | Facility: CLINIC | Age: 75
End: 2025-05-07

## 2025-05-13 ENCOUNTER — MED REC SCAN ONLY (OUTPATIENT)
Dept: FAMILY MEDICINE CLINIC | Facility: CLINIC | Age: 75
End: 2025-05-13

## 2025-05-14 ENCOUNTER — TELEPHONE (OUTPATIENT)
Dept: FAMILY MEDICINE CLINIC | Facility: CLINIC | Age: 75
End: 2025-05-14

## 2025-05-14 NOTE — TELEPHONE ENCOUNTER
Patient calling to inquire about if she needs to continue taking atorvastatin. Advised no order to discontinue found in chart. Patient has yet to initiate care with new provider. Soft transferred to scheduling for assistance.

## 2025-05-28 ENCOUNTER — TELEPHONE (OUTPATIENT)
Dept: PHYSICAL MEDICINE AND REHAB | Facility: CLINIC | Age: 75
End: 2025-05-28

## 2025-05-28 NOTE — TELEPHONE ENCOUNTER
Spoke with patient who wanted to make sure her medications were up to date. States she is on amitriptyline 25 mg, omeprazole 20 mg, topiramate 25 mg, atorvastatin 40 mg and propranalol 120 mg. Informed patient all of these medications were already on her medication list.     Patient also mentioned that her left eye has been twitching a lot.     Reviewed last office visit note with patient from Dr.Behar on 4/10/25 and this visit was for her neck pain and carpal tunnel symptoms.     Advised patient to reach out to her PCP to discuss her eye symptoms. Patient understood and was appreciative.

## 2025-06-12 ENCOUNTER — MED REC SCAN ONLY (OUTPATIENT)
Dept: PHYSICAL MEDICINE AND REHAB | Facility: CLINIC | Age: 75
End: 2025-06-12

## 2025-08-07 ENCOUNTER — HOSPITAL ENCOUNTER (INPATIENT)
Facility: HOSPITAL | Age: 75
LOS: 4 days | Discharge: HOME | End: 2025-08-11
Attending: STUDENT IN AN ORGANIZED HEALTH CARE EDUCATION/TRAINING PROGRAM | Admitting: HOSPITALIST

## 2025-08-07 ENCOUNTER — APPOINTMENT (OUTPATIENT)
Dept: CT IMAGING | Facility: HOSPITAL | Age: 75
End: 2025-08-07
Attending: STUDENT IN AN ORGANIZED HEALTH CARE EDUCATION/TRAINING PROGRAM

## 2025-08-07 ENCOUNTER — APPOINTMENT (OUTPATIENT)
Dept: ULTRASOUND IMAGING | Facility: HOSPITAL | Age: 75
End: 2025-08-07
Attending: STUDENT IN AN ORGANIZED HEALTH CARE EDUCATION/TRAINING PROGRAM

## 2025-08-07 ENCOUNTER — OFFICE VISIT (OUTPATIENT)
Dept: FAMILY MEDICINE CLINIC | Facility: CLINIC | Age: 75
End: 2025-08-07

## 2025-08-07 VITALS
OXYGEN SATURATION: 100 % | BODY MASS INDEX: 22.46 KG/M2 | RESPIRATION RATE: 18 BRPM | HEIGHT: 63.5 IN | TEMPERATURE: 99 F | HEART RATE: 84 BPM | SYSTOLIC BLOOD PRESSURE: 84 MMHG | DIASTOLIC BLOOD PRESSURE: 41 MMHG | WEIGHT: 128.38 LBS

## 2025-08-07 DIAGNOSIS — I95.9 HYPOTENSION, UNSPECIFIED HYPOTENSION TYPE: ICD-10-CM

## 2025-08-07 DIAGNOSIS — R53.83 LETHARGIC: Primary | ICD-10-CM

## 2025-08-07 DIAGNOSIS — N12 PYELONEPHRITIS: Primary | ICD-10-CM

## 2025-08-07 DIAGNOSIS — D50.8 OTHER IRON DEFICIENCY ANEMIA: ICD-10-CM

## 2025-08-07 DIAGNOSIS — E87.1 HYPONATREMIA: ICD-10-CM

## 2025-08-07 DIAGNOSIS — N39.0 SEPSIS DUE TO URINARY TRACT INFECTION (HCC): ICD-10-CM

## 2025-08-07 DIAGNOSIS — R30.0 DYSURIA: ICD-10-CM

## 2025-08-07 DIAGNOSIS — A41.9 SEPSIS DUE TO URINARY TRACT INFECTION (HCC): ICD-10-CM

## 2025-08-07 PROBLEM — N30.01 ACUTE CYSTITIS WITH HEMATURIA: Status: ACTIVE | Noted: 2025-08-07

## 2025-08-07 LAB
ALBUMIN SERPL-MCNC: 3.3 G/DL (ref 3.2–4.8)
ALBUMIN/GLOB SERPL: 1.5 (ref 1–2)
ALP LIVER SERPL-CCNC: 255 U/L (ref 55–142)
ALT SERPL-CCNC: 90 U/L (ref 10–49)
ANION GAP SERPL CALC-SCNC: 8 MMOL/L (ref 0–18)
AST SERPL-CCNC: 48 U/L (ref ?–34)
ATRIAL RATE: 82 BPM
BASOPHILS # BLD: 0 X10(3) UL (ref 0–0.2)
BASOPHILS NFR BLD: 0 %
BILIRUB SERPL-MCNC: 1.3 MG/DL (ref 0.2–1.1)
BILIRUB UR QL: NEGATIVE
BUN BLD-MCNC: 17 MG/DL (ref 9–23)
BUN/CREAT SERPL: 15.3 (ref 10–20)
CALCIUM BLD-MCNC: 8 MG/DL (ref 8.7–10.4)
CHLORIDE SERPL-SCNC: 101 MMOL/L (ref 98–112)
CO2 SERPL-SCNC: 21 MMOL/L (ref 21–32)
COLOR UR: YELLOW
CREAT BLD-MCNC: 1.11 MG/DL (ref 0.55–1.02)
DEPRECATED RDW RBC AUTO: 45.8 FL (ref 35.1–46.3)
DOHLE BOD BLD QL SMEAR: PRESENT
EGFRCR SERPLBLD CKD-EPI 2021: 52 ML/MIN/1.73M2 (ref 60–?)
EOSINOPHIL # BLD: 0.19 X10(3) UL (ref 0–0.7)
EOSINOPHIL NFR BLD: 1 %
ERYTHROCYTE [DISTWIDTH] IN BLOOD BY AUTOMATED COUNT: 15.5 % (ref 11–15)
GLOBULIN PLAS-MCNC: 2.2 G/DL (ref 2–3.5)
GLUCOSE BLD-MCNC: 132 MG/DL (ref 70–99)
GLUCOSE BLDC GLUCOMTR-MCNC: 146 MG/DL (ref 70–99)
GLUCOSE UR-MCNC: NORMAL MG/DL
HCT VFR BLD AUTO: 29.2 % (ref 35–48)
HGB BLD-MCNC: 9.2 G/DL (ref 12–16)
KETONES UR-MCNC: NEGATIVE MG/DL
LACTATE SERPL-SCNC: 1.8 MMOL/L (ref 0.5–2)
LACTATE SERPL-SCNC: 2.1 MMOL/L (ref 0.5–2)
LACTATE SERPL-SCNC: 2.5 MMOL/L (ref 0.5–2)
LEUKOCYTE ESTERASE UR QL STRIP.AUTO: 500
LYMPHOCYTES NFR BLD: 0.19 X10(3) UL (ref 1–4)
LYMPHOCYTES NFR BLD: 1 %
MCH RBC QN AUTO: 25.5 PG (ref 26–34)
MCHC RBC AUTO-ENTMCNC: 31.5 G/DL (ref 31–37)
MCV RBC AUTO: 80.9 FL (ref 80–100)
METAMYELOCYTES # BLD: 0.58 X10(3) UL (ref ?–0.01)
METAMYELOCYTES NFR BLD: 3 %
MONOCYTES # BLD: 0.39 X10(3) UL (ref 0.1–1)
MONOCYTES NFR BLD: 2 %
NEUTROPHILS # BLD AUTO: 15.9 X10 (3) UL (ref 1.5–7.7)
NEUTROPHILS NFR BLD: 77 %
NEUTS BAND NFR BLD: 16 %
NEUTS HYPERSEG # BLD: 17.95 X10(3) UL (ref 1.5–7.7)
NEUTS VAC BLD QL SMEAR: PRESENT
OSMOLALITY SERPL CALC.SUM OF ELEC: 273 MOSM/KG (ref 275–295)
P AXIS: 51 DEGREES
P-R INTERVAL: 190 MS
PH UR: 6 (ref 5–8)
PLATELET # BLD AUTO: 155 10(3)UL (ref 150–450)
PLATELET MORPHOLOGY: NORMAL
POTASSIUM SERPL-SCNC: 3.4 MMOL/L (ref 3.5–5.1)
PROT SERPL-MCNC: 5.5 G/DL (ref 5.7–8.2)
PROT UR-MCNC: 50 MG/DL
Q-T INTERVAL: 360 MS
QRS DURATION: 78 MS
QTC CALCULATION (BEZET): 420 MS
R AXIS: 10 DEGREES
RBC # BLD AUTO: 3.61 X10(6)UL (ref 3.8–5.3)
SODIUM SERPL-SCNC: 130 MMOL/L (ref 136–145)
SP GR UR STRIP: 1.01 (ref 1–1.03)
T AXIS: 41 DEGREES
TOTAL CELLS COUNTED BLD: 100
TOXIC GRANULES BLD QL SMEAR: PRESENT
UROBILINOGEN UR STRIP-ACNC: NORMAL
VENTRICULAR RATE: 82 BPM
WBC # BLD AUTO: 19.3 X10(3) UL (ref 4–11)
WBC #/AREA URNS AUTO: >50 /HPF
WBC CLUMPS UR QL AUTO: PRESENT /HPF

## 2025-08-07 PROCEDURE — 99223 1ST HOSP IP/OBS HIGH 75: CPT | Performed by: HOSPITALIST

## 2025-08-07 PROCEDURE — 76705 ECHO EXAM OF ABDOMEN: CPT | Performed by: STUDENT IN AN ORGANIZED HEALTH CARE EDUCATION/TRAINING PROGRAM

## 2025-08-07 PROCEDURE — 3078F DIAST BP <80 MM HG: CPT | Performed by: NURSE PRACTITIONER

## 2025-08-07 PROCEDURE — 74177 CT ABD & PELVIS W/CONTRAST: CPT | Performed by: STUDENT IN AN ORGANIZED HEALTH CARE EDUCATION/TRAINING PROGRAM

## 2025-08-07 PROCEDURE — 3008F BODY MASS INDEX DOCD: CPT | Performed by: NURSE PRACTITIONER

## 2025-08-07 PROCEDURE — 3074F SYST BP LT 130 MM HG: CPT | Performed by: NURSE PRACTITIONER

## 2025-08-07 PROCEDURE — 99215 OFFICE O/P EST HI 40 MIN: CPT | Performed by: NURSE PRACTITIONER

## 2025-08-07 RX ORDER — TOPIRAMATE 25 MG/1
25 TABLET, FILM COATED ORAL DAILY
Status: DISCONTINUED | OUTPATIENT
Start: 2025-08-07 | End: 2025-08-11

## 2025-08-07 RX ORDER — MORPHINE SULFATE 4 MG/ML
4 INJECTION, SOLUTION INTRAMUSCULAR; INTRAVENOUS EVERY 2 HOUR PRN
Status: DISCONTINUED | OUTPATIENT
Start: 2025-08-07 | End: 2025-08-11

## 2025-08-07 RX ORDER — PANTOPRAZOLE SODIUM 40 MG/1
40 TABLET, DELAYED RELEASE ORAL
Status: DISCONTINUED | OUTPATIENT
Start: 2025-08-08 | End: 2025-08-11

## 2025-08-07 RX ORDER — ACETAMINOPHEN 500 MG
500 TABLET ORAL EVERY 4 HOURS PRN
Status: DISCONTINUED | OUTPATIENT
Start: 2025-08-07 | End: 2025-08-11

## 2025-08-07 RX ORDER — METOCLOPRAMIDE HYDROCHLORIDE 5 MG/ML
5 INJECTION INTRAMUSCULAR; INTRAVENOUS EVERY 8 HOURS PRN
Status: DISCONTINUED | OUTPATIENT
Start: 2025-08-07 | End: 2025-08-11

## 2025-08-07 RX ORDER — ACETAMINOPHEN 500 MG
1000 TABLET ORAL ONCE
Status: COMPLETED | OUTPATIENT
Start: 2025-08-07 | End: 2025-08-07

## 2025-08-07 RX ORDER — TEMAZEPAM 15 MG/1
15 CAPSULE ORAL NIGHTLY PRN
Status: DISCONTINUED | OUTPATIENT
Start: 2025-08-07 | End: 2025-08-11

## 2025-08-07 RX ORDER — ONDANSETRON 2 MG/ML
4 INJECTION INTRAMUSCULAR; INTRAVENOUS EVERY 6 HOURS PRN
Status: DISCONTINUED | OUTPATIENT
Start: 2025-08-07 | End: 2025-08-11

## 2025-08-07 RX ORDER — ATORVASTATIN CALCIUM 40 MG/1
40 TABLET, FILM COATED ORAL NIGHTLY
Status: DISCONTINUED | OUTPATIENT
Start: 2025-08-07 | End: 2025-08-11

## 2025-08-07 RX ORDER — PRIMIDONE 50 MG/1
50 TABLET ORAL NIGHTLY
Status: DISCONTINUED | OUTPATIENT
Start: 2025-08-07 | End: 2025-08-11

## 2025-08-07 RX ORDER — MORPHINE SULFATE 2 MG/ML
1 INJECTION, SOLUTION INTRAMUSCULAR; INTRAVENOUS EVERY 2 HOUR PRN
Status: DISCONTINUED | OUTPATIENT
Start: 2025-08-07 | End: 2025-08-11

## 2025-08-07 RX ORDER — ONDANSETRON 2 MG/ML
4 INJECTION INTRAMUSCULAR; INTRAVENOUS ONCE
Status: COMPLETED | OUTPATIENT
Start: 2025-08-07 | End: 2025-08-07

## 2025-08-07 RX ORDER — SODIUM CHLORIDE 9 MG/ML
INJECTION, SOLUTION INTRAVENOUS CONTINUOUS
Status: DISCONTINUED | OUTPATIENT
Start: 2025-08-07 | End: 2025-08-09

## 2025-08-07 RX ORDER — MORPHINE SULFATE 2 MG/ML
2 INJECTION, SOLUTION INTRAMUSCULAR; INTRAVENOUS EVERY 2 HOUR PRN
Status: DISCONTINUED | OUTPATIENT
Start: 2025-08-07 | End: 2025-08-11

## 2025-08-07 RX ORDER — HEPARIN SODIUM 5000 [USP'U]/ML
5000 INJECTION, SOLUTION INTRAVENOUS; SUBCUTANEOUS EVERY 12 HOURS SCHEDULED
Status: DISCONTINUED | OUTPATIENT
Start: 2025-08-07 | End: 2025-08-11

## 2025-08-08 ENCOUNTER — APPOINTMENT (OUTPATIENT)
Dept: NUCLEAR MEDICINE | Facility: HOSPITAL | Age: 75
End: 2025-08-08

## 2025-08-08 ENCOUNTER — APPOINTMENT (OUTPATIENT)
Dept: GENERAL RADIOLOGY | Facility: HOSPITAL | Age: 75
End: 2025-08-08
Attending: INTERNAL MEDICINE

## 2025-08-08 LAB
ALBUMIN SERPL-MCNC: 2.8 G/DL (ref 3.2–4.8)
ALBUMIN/GLOB SERPL: 1.5 (ref 1–2)
ALP LIVER SERPL-CCNC: 214 U/L (ref 55–142)
ALT SERPL-CCNC: 62 U/L (ref 10–49)
ANION GAP SERPL CALC-SCNC: 8 MMOL/L (ref 0–18)
AST SERPL-CCNC: 30 U/L (ref ?–34)
ATRIAL RATE: 107 BPM
BASOPHILS # BLD: 0 X10(3) UL (ref 0–0.2)
BASOPHILS NFR BLD: 0 %
BILIRUB SERPL-MCNC: 0.7 MG/DL (ref 0.2–1.1)
BUN BLD-MCNC: 17 MG/DL (ref 9–23)
BUN/CREAT SERPL: 17.5 (ref 10–20)
CALCIUM BLD-MCNC: 7.4 MG/DL (ref 8.7–10.4)
CHLORIDE SERPL-SCNC: 110 MMOL/L (ref 98–112)
CO2 SERPL-SCNC: 19 MMOL/L (ref 21–32)
CREAT BLD-MCNC: 0.97 MG/DL (ref 0.55–1.02)
DEPRECATED RDW RBC AUTO: 47.3 FL (ref 35.1–46.3)
DOHLE BOD BLD QL SMEAR: PRESENT
EGFRCR SERPLBLD CKD-EPI 2021: 61 ML/MIN/1.73M2 (ref 60–?)
EOSINOPHIL # BLD: 0 X10(3) UL (ref 0–0.7)
EOSINOPHIL NFR BLD: 0 %
ERYTHROCYTE [DISTWIDTH] IN BLOOD BY AUTOMATED COUNT: 15.6 % (ref 11–15)
GLOBULIN PLAS-MCNC: 1.9 G/DL (ref 2–3.5)
GLUCOSE BLD-MCNC: 75 MG/DL (ref 70–99)
HCT VFR BLD AUTO: 25.6 % (ref 35–48)
HGB BLD-MCNC: 8.2 G/DL (ref 12–16)
LYMPHOCYTES NFR BLD: 0.6 X10(3) UL (ref 1–4)
LYMPHOCYTES NFR BLD: 3 %
MCH RBC QN AUTO: 26.2 PG (ref 26–34)
MCHC RBC AUTO-ENTMCNC: 32 G/DL (ref 31–37)
MCV RBC AUTO: 81.8 FL (ref 80–100)
METAMYELOCYTES # BLD: 0.2 X10(3) UL (ref ?–0.01)
METAMYELOCYTES NFR BLD: 1 %
MONOCYTES # BLD: 0.2 X10(3) UL (ref 0.1–1)
MONOCYTES NFR BLD: 1 %
NEUTROPHILS # BLD AUTO: 18.73 X10 (3) UL (ref 1.5–7.7)
NEUTROPHILS NFR BLD: 77 %
NEUTS BAND NFR BLD: 18 %
NEUTS HYPERSEG # BLD: 19.1 X10(3) UL (ref 1.5–7.7)
NEUTS VAC BLD QL SMEAR: PRESENT
OSMOLALITY SERPL CALC.SUM OF ELEC: 284 MOSM/KG (ref 275–295)
P AXIS: 41 DEGREES
P-R INTERVAL: 178 MS
PLATELET # BLD AUTO: 141 10(3)UL (ref 150–450)
PLATELET MORPHOLOGY: NORMAL
PLATELETS.RETICULATED NFR BLD AUTO: 3.6 % (ref 0–7)
POTASSIUM SERPL-SCNC: 3.4 MMOL/L (ref 3.5–5.1)
PROT SERPL-MCNC: 4.7 G/DL (ref 5.7–8.2)
Q-T INTERVAL: 322 MS
QRS DURATION: 72 MS
QTC CALCULATION (BEZET): 429 MS
R AXIS: -11 DEGREES
RBC # BLD AUTO: 3.13 X10(6)UL (ref 3.8–5.3)
SODIUM SERPL-SCNC: 137 MMOL/L (ref 136–145)
T AXIS: 15 DEGREES
TOTAL CELLS COUNTED BLD: 100
TOXIC GRANULES BLD QL SMEAR: PRESENT
VENTRICULAR RATE: 107 BPM
WBC # BLD AUTO: 20.1 X10(3) UL (ref 4–11)

## 2025-08-08 PROCEDURE — 99232 SBSQ HOSP IP/OBS MODERATE 35: CPT | Performed by: SURGERY

## 2025-08-08 PROCEDURE — 71045 X-RAY EXAM CHEST 1 VIEW: CPT | Performed by: INTERNAL MEDICINE

## 2025-08-08 PROCEDURE — 99233 SBSQ HOSP IP/OBS HIGH 50: CPT | Performed by: INTERNAL MEDICINE

## 2025-08-08 PROCEDURE — 99497 ADVNCD CARE PLAN 30 MIN: CPT | Performed by: INTERNAL MEDICINE

## 2025-08-08 PROCEDURE — 78227 HEPATOBIL SYST IMAGE W/DRUG: CPT

## 2025-08-08 RX ORDER — ALBUTEROL SULFATE 0.83 MG/ML
2.5 SOLUTION RESPIRATORY (INHALATION) EVERY 4 HOURS PRN
Status: DISCONTINUED | OUTPATIENT
Start: 2025-08-08 | End: 2025-08-09

## 2025-08-08 RX ORDER — POTASSIUM CHLORIDE 1500 MG/1
40 TABLET, EXTENDED RELEASE ORAL ONCE
Status: COMPLETED | OUTPATIENT
Start: 2025-08-08 | End: 2025-08-08

## 2025-08-09 ENCOUNTER — APPOINTMENT (OUTPATIENT)
Dept: GENERAL RADIOLOGY | Facility: HOSPITAL | Age: 75
End: 2025-08-09
Attending: INTERNAL MEDICINE

## 2025-08-09 LAB
ALBUMIN SERPL-MCNC: 2.9 G/DL (ref 3.2–4.8)
ALBUMIN/GLOB SERPL: 1.4 (ref 1–2)
ALP LIVER SERPL-CCNC: 263 U/L (ref 55–142)
ALT SERPL-CCNC: 49 U/L (ref 10–49)
ANION GAP SERPL CALC-SCNC: 9 MMOL/L (ref 0–18)
AST SERPL-CCNC: 24 U/L (ref ?–34)
BASOPHILS # BLD AUTO: 0.02 X10(3) UL (ref 0–0.2)
BASOPHILS NFR BLD AUTO: 0.2 %
BILIRUB SERPL-MCNC: 0.5 MG/DL (ref 0.2–1.1)
BUN BLD-MCNC: 14 MG/DL (ref 9–23)
BUN/CREAT SERPL: 16.3 (ref 10–20)
CALCIUM BLD-MCNC: 7.6 MG/DL (ref 8.7–10.4)
CHLORIDE SERPL-SCNC: 108 MMOL/L (ref 98–112)
CO2 SERPL-SCNC: 17 MMOL/L (ref 21–32)
CREAT BLD-MCNC: 0.86 MG/DL (ref 0.55–1.02)
DEPRECATED RDW RBC AUTO: 48.2 FL (ref 35.1–46.3)
EGFRCR SERPLBLD CKD-EPI 2021: 70 ML/MIN/1.73M2 (ref 60–?)
EOSINOPHIL # BLD AUTO: 0.08 X10(3) UL (ref 0–0.7)
EOSINOPHIL NFR BLD AUTO: 0.7 %
ERYTHROCYTE [DISTWIDTH] IN BLOOD BY AUTOMATED COUNT: 16.2 % (ref 11–15)
GLOBULIN PLAS-MCNC: 2.1 G/DL (ref 2–3.5)
GLUCOSE BLD-MCNC: 65 MG/DL (ref 70–99)
GLUCOSE BLDC GLUCOMTR-MCNC: 40 MG/DL (ref 70–99)
GLUCOSE BLDC GLUCOMTR-MCNC: 75 MG/DL (ref 70–99)
HCT VFR BLD AUTO: 25.6 % (ref 35–48)
HGB BLD-MCNC: 8.1 G/DL (ref 12–16)
IMM GRANULOCYTES # BLD AUTO: 0.09 X10(3) UL (ref 0–1)
IMM GRANULOCYTES NFR BLD: 0.7 %
LYMPHOCYTES # BLD AUTO: 0.79 X10(3) UL (ref 1–4)
LYMPHOCYTES NFR BLD AUTO: 6.6 %
MAGNESIUM SERPL-MCNC: 1.5 MG/DL (ref 1.6–2.6)
MCH RBC QN AUTO: 25.6 PG (ref 26–34)
MCHC RBC AUTO-ENTMCNC: 31.6 G/DL (ref 31–37)
MCV RBC AUTO: 80.8 FL (ref 80–100)
MONOCYTES # BLD AUTO: 0.56 X10(3) UL (ref 0.1–1)
MONOCYTES NFR BLD AUTO: 4.6 %
NEUTROPHILS # BLD AUTO: 10.52 X10 (3) UL (ref 1.5–7.7)
NEUTROPHILS # BLD AUTO: 10.52 X10(3) UL (ref 1.5–7.7)
NEUTROPHILS NFR BLD AUTO: 87.2 %
OSMOLALITY SERPL CALC.SUM OF ELEC: 277 MOSM/KG (ref 275–295)
PHOSPHATE SERPL-MCNC: 1.9 MG/DL (ref 2.4–5.1)
PLATELET # BLD AUTO: 144 10(3)UL (ref 150–450)
PLATELETS.RETICULATED NFR BLD AUTO: 3.4 % (ref 0–7)
POTASSIUM SERPL-SCNC: 3.3 MMOL/L (ref 3.5–5.1)
POTASSIUM SERPL-SCNC: 3.3 MMOL/L (ref 3.5–5.1)
POTASSIUM SERPL-SCNC: 4.2 MMOL/L (ref 3.5–5.1)
PROT SERPL-MCNC: 5 G/DL (ref 5.7–8.2)
RBC # BLD AUTO: 3.17 X10(6)UL (ref 3.8–5.3)
SODIUM SERPL-SCNC: 134 MMOL/L (ref 136–145)
WBC # BLD AUTO: 12.1 X10(3) UL (ref 4–11)

## 2025-08-09 PROCEDURE — 71045 X-RAY EXAM CHEST 1 VIEW: CPT | Performed by: INTERNAL MEDICINE

## 2025-08-09 PROCEDURE — 99233 SBSQ HOSP IP/OBS HIGH 50: CPT | Performed by: INTERNAL MEDICINE

## 2025-08-09 RX ORDER — POTASSIUM CHLORIDE 1500 MG/1
40 TABLET, EXTENDED RELEASE ORAL ONCE
Status: COMPLETED | OUTPATIENT
Start: 2025-08-09 | End: 2025-08-09

## 2025-08-09 RX ORDER — MAGNESIUM OXIDE 400 MG/1
800 TABLET ORAL ONCE
Status: COMPLETED | OUTPATIENT
Start: 2025-08-09 | End: 2025-08-09

## 2025-08-09 RX ORDER — POTASSIUM CHLORIDE 14.9 MG/ML
20 INJECTION INTRAVENOUS ONCE
Status: DISCONTINUED | OUTPATIENT
Start: 2025-08-09 | End: 2025-08-09

## 2025-08-09 RX ORDER — MAGNESIUM OXIDE 400 MG/1
800 TABLET ORAL ONCE
Status: DISCONTINUED | OUTPATIENT
Start: 2025-08-09 | End: 2025-08-11

## 2025-08-09 RX ORDER — ALBUTEROL SULFATE 0.83 MG/ML
2.5 SOLUTION RESPIRATORY (INHALATION)
Status: DISCONTINUED | OUTPATIENT
Start: 2025-08-09 | End: 2025-08-11

## 2025-08-10 LAB
ALBUMIN SERPL-MCNC: 2.8 G/DL (ref 3.2–4.8)
ALBUMIN/GLOB SERPL: 1.4 (ref 1–2)
ALP LIVER SERPL-CCNC: 332 U/L (ref 55–142)
ALT SERPL-CCNC: 35 U/L (ref 10–49)
ANION GAP SERPL CALC-SCNC: 8 MMOL/L (ref 0–18)
AST SERPL-CCNC: 17 U/L (ref ?–34)
BACTERIA BLD CULT: POSITIVE
BACTERIA BLD CULT: POSITIVE
BASOPHILS # BLD AUTO: 0.02 X10(3) UL (ref 0–0.2)
BASOPHILS NFR BLD AUTO: 0.3 %
BILIRUB SERPL-MCNC: 0.5 MG/DL (ref 0.2–1.1)
BLACTX-M ISLT/SPM QL: NOT DETECTED
BUN BLD-MCNC: 12 MG/DL (ref 9–23)
BUN/CREAT SERPL: 14 (ref 10–20)
CALCIUM BLD-MCNC: 8 MG/DL (ref 8.7–10.4)
CHLORIDE SERPL-SCNC: 106 MMOL/L (ref 98–112)
CO2 SERPL-SCNC: 18 MMOL/L (ref 21–32)
CREAT BLD-MCNC: 0.86 MG/DL (ref 0.55–1.02)
DEPRECATED RDW RBC AUTO: 47.2 FL (ref 35.1–46.3)
E COLI DNA BLD POS QL NAA+NON-PROBE: DETECTED
EGFRCR SERPLBLD CKD-EPI 2021: 70 ML/MIN/1.73M2 (ref 60–?)
EOSINOPHIL # BLD AUTO: 0.06 X10(3) UL (ref 0–0.7)
EOSINOPHIL NFR BLD AUTO: 0.9 %
ERYTHROCYTE [DISTWIDTH] IN BLOOD BY AUTOMATED COUNT: 16.2 % (ref 11–15)
GLOBULIN PLAS-MCNC: 2 G/DL (ref 2–3.5)
GLUCOSE BLD-MCNC: 71 MG/DL (ref 70–99)
HCT VFR BLD AUTO: 22.7 % (ref 35–48)
HEMOCCULT STL QL: NEGATIVE
HGB BLD-MCNC: 7.3 G/DL (ref 12–16)
IMM GRANULOCYTES # BLD AUTO: 0.05 X10(3) UL (ref 0–1)
IMM GRANULOCYTES NFR BLD: 0.8 %
LYMPHOCYTES # BLD AUTO: 0.9 X10(3) UL (ref 1–4)
LYMPHOCYTES NFR BLD AUTO: 13.7 %
MAGNESIUM SERPL-MCNC: 1.5 MG/DL (ref 1.6–2.6)
MCH RBC QN AUTO: 25.6 PG (ref 26–34)
MCHC RBC AUTO-ENTMCNC: 32.2 G/DL (ref 31–37)
MCV RBC AUTO: 79.6 FL (ref 80–100)
MONOCYTES # BLD AUTO: 0.7 X10(3) UL (ref 0.1–1)
MONOCYTES NFR BLD AUTO: 10.6 %
NEUTROPHILS # BLD AUTO: 4.85 X10 (3) UL (ref 1.5–7.7)
NEUTROPHILS # BLD AUTO: 4.85 X10(3) UL (ref 1.5–7.7)
NEUTROPHILS NFR BLD AUTO: 73.7 %
OSMOLALITY SERPL CALC.SUM OF ELEC: 272 MOSM/KG (ref 275–295)
PHOSPHATE SERPL-MCNC: 2 MG/DL (ref 2.4–5.1)
PLATELET # BLD AUTO: 113 10(3)UL (ref 150–450)
POTASSIUM SERPL-SCNC: 3.5 MMOL/L (ref 3.5–5.1)
POTASSIUM SERPL-SCNC: 3.5 MMOL/L (ref 3.5–5.1)
PROT SERPL-MCNC: 4.8 G/DL (ref 5.7–8.2)
RBC # BLD AUTO: 2.85 X10(6)UL (ref 3.8–5.3)
SODIUM SERPL-SCNC: 132 MMOL/L (ref 136–145)
WBC # BLD AUTO: 6.6 X10(3) UL (ref 4–11)

## 2025-08-10 PROCEDURE — 99233 SBSQ HOSP IP/OBS HIGH 50: CPT | Performed by: INTERNAL MEDICINE

## 2025-08-10 RX ORDER — POLYETHYLENE GLYCOL 3350 17 G/17G
17 POWDER, FOR SOLUTION ORAL DAILY PRN
Status: DISCONTINUED | OUTPATIENT
Start: 2025-08-10 | End: 2025-08-11

## 2025-08-10 RX ORDER — SODIUM CHLORIDE 1 G/1
1 TABLET ORAL
Status: DISCONTINUED | OUTPATIENT
Start: 2025-08-10 | End: 2025-08-11

## 2025-08-10 RX ORDER — DOCUSATE SODIUM 100 MG/1
100 CAPSULE, LIQUID FILLED ORAL 2 TIMES DAILY
Status: DISCONTINUED | OUTPATIENT
Start: 2025-08-10 | End: 2025-08-11

## 2025-08-10 RX ORDER — BISACODYL 10 MG
10 SUPPOSITORY, RECTAL RECTAL
Status: DISCONTINUED | OUTPATIENT
Start: 2025-08-10 | End: 2025-08-11

## 2025-08-11 VITALS
HEART RATE: 91 BPM | HEIGHT: 63 IN | SYSTOLIC BLOOD PRESSURE: 139 MMHG | OXYGEN SATURATION: 98 % | DIASTOLIC BLOOD PRESSURE: 79 MMHG | RESPIRATION RATE: 16 BRPM | BODY MASS INDEX: 22.89 KG/M2 | WEIGHT: 129.19 LBS | TEMPERATURE: 98 F

## 2025-08-11 LAB
ALBUMIN SERPL-MCNC: 2.9 G/DL (ref 3.2–4.8)
ALBUMIN/GLOB SERPL: 1.4 (ref 1–2)
ALP LIVER SERPL-CCNC: 393 U/L (ref 55–142)
ALT SERPL-CCNC: 33 U/L (ref 10–49)
ANION GAP SERPL CALC-SCNC: 9 MMOL/L (ref 0–18)
AST SERPL-CCNC: 23 U/L (ref ?–34)
BASOPHILS # BLD AUTO: 0.02 X10(3) UL (ref 0–0.2)
BASOPHILS NFR BLD AUTO: 0.3 %
BILIRUB SERPL-MCNC: 0.6 MG/DL (ref 0.2–1.1)
BUN BLD-MCNC: 13 MG/DL (ref 9–23)
BUN/CREAT SERPL: 15.9 (ref 10–20)
CALCIUM BLD-MCNC: 7.9 MG/DL (ref 8.7–10.4)
CHLORIDE SERPL-SCNC: 103 MMOL/L (ref 98–112)
CO2 SERPL-SCNC: 19 MMOL/L (ref 21–32)
CREAT BLD-MCNC: 0.82 MG/DL (ref 0.55–1.02)
DEPRECATED RDW RBC AUTO: 46.4 FL (ref 35.1–46.3)
EGFRCR SERPLBLD CKD-EPI 2021: 75 ML/MIN/1.73M2 (ref 60–?)
EOSINOPHIL # BLD AUTO: 0.12 X10(3) UL (ref 0–0.7)
EOSINOPHIL NFR BLD AUTO: 1.6 %
ERYTHROCYTE [DISTWIDTH] IN BLOOD BY AUTOMATED COUNT: 16.2 % (ref 11–15)
GLOBULIN PLAS-MCNC: 2.1 G/DL (ref 2–3.5)
GLUCOSE BLD-MCNC: 73 MG/DL (ref 70–99)
HCT VFR BLD AUTO: 22.5 % (ref 35–48)
HGB BLD-MCNC: 7.3 G/DL (ref 12–16)
IMM GRANULOCYTES # BLD AUTO: 0.05 X10(3) UL (ref 0–1)
IMM GRANULOCYTES NFR BLD: 0.6 %
LYMPHOCYTES # BLD AUTO: 1.03 X10(3) UL (ref 1–4)
LYMPHOCYTES NFR BLD AUTO: 13.3 %
MAGNESIUM SERPL-MCNC: 1.7 MG/DL (ref 1.6–2.6)
MCH RBC QN AUTO: 24.9 PG (ref 26–34)
MCHC RBC AUTO-ENTMCNC: 32.4 G/DL (ref 31–37)
MCV RBC AUTO: 76.8 FL (ref 80–100)
MONOCYTES # BLD AUTO: 0.94 X10(3) UL (ref 0.1–1)
MONOCYTES NFR BLD AUTO: 12.2 %
NEUTROPHILS # BLD AUTO: 5.57 X10 (3) UL (ref 1.5–7.7)
NEUTROPHILS # BLD AUTO: 5.57 X10(3) UL (ref 1.5–7.7)
NEUTROPHILS NFR BLD AUTO: 72 %
OSMOLALITY SERPL CALC.SUM OF ELEC: 271 MOSM/KG (ref 275–295)
PHOSPHATE SERPL-MCNC: 2.7 MG/DL (ref 2.4–5.1)
PLATELET # BLD AUTO: 139 10(3)UL (ref 150–450)
POTASSIUM SERPL-SCNC: 3.4 MMOL/L (ref 3.5–5.1)
PROT SERPL-MCNC: 5 G/DL (ref 5.7–8.2)
RBC # BLD AUTO: 2.93 X10(6)UL (ref 3.8–5.3)
SODIUM SERPL-SCNC: 131 MMOL/L (ref 136–145)
WBC # BLD AUTO: 7.7 X10(3) UL (ref 4–11)

## 2025-08-11 PROCEDURE — 99239 HOSP IP/OBS DSCHRG MGMT >30: CPT | Performed by: INTERNAL MEDICINE

## 2025-08-11 RX ORDER — POTASSIUM CHLORIDE 1500 MG/1
40 TABLET, EXTENDED RELEASE ORAL ONCE
Status: COMPLETED | OUTPATIENT
Start: 2025-08-11 | End: 2025-08-11

## 2025-08-11 RX ORDER — SODIUM CHLORIDE 1 G/1
1 TABLET ORAL
Qty: 15 TABLET | Refills: 0 | Status: SHIPPED | OUTPATIENT
Start: 2025-08-11

## 2025-08-11 RX ORDER — CEFADROXIL 500 MG/1
500 CAPSULE ORAL 2 TIMES DAILY
Qty: 22 CAPSULE | Refills: 0 | Status: SHIPPED | OUTPATIENT
Start: 2025-08-11 | End: 2025-08-22

## 2025-08-11 RX ORDER — POTASSIUM CHLORIDE 1500 MG/1
20 TABLET, EXTENDED RELEASE ORAL 2 TIMES DAILY
Qty: 10 TABLET | Refills: 0 | Status: SHIPPED | OUTPATIENT
Start: 2025-08-11 | End: 2025-08-16

## 2025-08-14 ENCOUNTER — MED REC SCAN ONLY (OUTPATIENT)
Dept: FAMILY MEDICINE CLINIC | Facility: CLINIC | Age: 75
End: 2025-08-14

## 2025-08-15 ENCOUNTER — LAB ENCOUNTER (OUTPATIENT)
Dept: LAB | Age: 75
End: 2025-08-15

## 2025-08-15 ENCOUNTER — OFFICE VISIT (OUTPATIENT)
Dept: FAMILY MEDICINE CLINIC | Facility: CLINIC | Age: 75
End: 2025-08-15

## 2025-08-15 VITALS
TEMPERATURE: 98 F | HEIGHT: 63 IN | SYSTOLIC BLOOD PRESSURE: 132 MMHG | BODY MASS INDEX: 23.04 KG/M2 | WEIGHT: 130 LBS | HEART RATE: 81 BPM | OXYGEN SATURATION: 99 % | DIASTOLIC BLOOD PRESSURE: 84 MMHG

## 2025-08-15 DIAGNOSIS — R30.0 DYSURIA: ICD-10-CM

## 2025-08-15 DIAGNOSIS — D50.8 OTHER IRON DEFICIENCY ANEMIA: ICD-10-CM

## 2025-08-15 DIAGNOSIS — D64.9 ANEMIA, UNSPECIFIED TYPE: ICD-10-CM

## 2025-08-15 DIAGNOSIS — K80.10 GALLBLADDER STONE WITH NONACUTE CHOLECYSTITIS: ICD-10-CM

## 2025-08-15 DIAGNOSIS — E87.1 HYPONATREMIA: ICD-10-CM

## 2025-08-15 DIAGNOSIS — Z12.11 COLON CANCER SCREENING: ICD-10-CM

## 2025-08-15 DIAGNOSIS — R25.1 TREMORS OF NERVOUS SYSTEM: ICD-10-CM

## 2025-08-15 DIAGNOSIS — N12 PYELONEPHRITIS: ICD-10-CM

## 2025-08-15 DIAGNOSIS — R92.8 ABNORMAL MAMMOGRAM: ICD-10-CM

## 2025-08-15 DIAGNOSIS — N12 PYELONEPHRITIS: Primary | ICD-10-CM

## 2025-08-15 PROBLEM — D69.6 DECREASED PLATELET COUNT: Status: ACTIVE | Noted: 2025-08-15

## 2025-08-15 PROBLEM — E08.49 DIABETES DUE TO UNDRL CONDITION W OTH DIABETIC NEURO COMP (HCC): Status: ACTIVE | Noted: 2025-08-15

## 2025-08-15 PROBLEM — G20.C PARKINSONISM (HCC): Chronic | Status: RESOLVED | Noted: 2024-03-05 | Resolved: 2025-08-15

## 2025-08-15 LAB
ALBUMIN SERPL-MCNC: 3.8 G/DL (ref 3.2–4.8)
ALBUMIN/GLOB SERPL: 1.3 (ref 1–2)
ALP LIVER SERPL-CCNC: 449 U/L (ref 55–142)
ALT SERPL-CCNC: 34 U/L (ref 10–49)
ANION GAP SERPL CALC-SCNC: 7 MMOL/L (ref 0–18)
AST SERPL-CCNC: 30 U/L (ref ?–34)
BASOPHILS # BLD AUTO: 0.04 X10(3) UL (ref 0–0.2)
BASOPHILS NFR BLD AUTO: 0.4 %
BILIRUB SERPL-MCNC: 0.5 MG/DL (ref 0.2–1.1)
BILIRUB UR QL: NEGATIVE
BUN BLD-MCNC: 11 MG/DL (ref 9–23)
BUN/CREAT SERPL: 14.9 (ref 10–20)
CALCIUM BLD-MCNC: 9 MG/DL (ref 8.7–10.4)
CHLORIDE SERPL-SCNC: 103 MMOL/L (ref 98–112)
CLARITY UR: CLEAR
CO2 SERPL-SCNC: 24 MMOL/L (ref 21–32)
COLOR UR: COLORLESS
CREAT BLD-MCNC: 0.74 MG/DL (ref 0.55–1.02)
DEPRECATED HBV CORE AB SER IA-ACNC: 117 NG/ML (ref 50–306)
DEPRECATED RDW RBC AUTO: 48.4 FL (ref 35.1–46.3)
EGFRCR SERPLBLD CKD-EPI 2021: 84 ML/MIN/1.73M2 (ref 60–?)
EOSINOPHIL # BLD AUTO: 0.11 X10(3) UL (ref 0–0.7)
EOSINOPHIL NFR BLD AUTO: 1.1 %
ERYTHROCYTE [DISTWIDTH] IN BLOOD BY AUTOMATED COUNT: 16.9 % (ref 11–15)
GLOBULIN PLAS-MCNC: 3 G/DL (ref 2–3.5)
GLUCOSE BLD-MCNC: 77 MG/DL (ref 70–99)
GLUCOSE UR-MCNC: NORMAL MG/DL
HCT VFR BLD AUTO: 26.3 % (ref 35–48)
HGB BLD-MCNC: 8.3 G/DL (ref 12–16)
IMM GRANULOCYTES # BLD AUTO: 0.09 X10(3) UL (ref 0–1)
IMM GRANULOCYTES NFR BLD: 0.9 %
IRON SATN MFR SERPL: 11 % (ref 15–50)
IRON SERPL-MCNC: 34 UG/DL (ref 50–170)
KETONES UR-MCNC: NEGATIVE MG/DL
LEUKOCYTE ESTERASE UR QL STRIP.AUTO: 75
LYMPHOCYTES # BLD AUTO: 1.6 X10(3) UL (ref 1–4)
LYMPHOCYTES NFR BLD AUTO: 16.1 %
MCH RBC QN AUTO: 25.2 PG (ref 26–34)
MCHC RBC AUTO-ENTMCNC: 31.6 G/DL (ref 31–37)
MCV RBC AUTO: 79.9 FL (ref 80–100)
MONOCYTES # BLD AUTO: 0.76 X10(3) UL (ref 0.1–1)
MONOCYTES NFR BLD AUTO: 7.6 %
NEUTROPHILS # BLD AUTO: 7.35 X10 (3) UL (ref 1.5–7.7)
NEUTROPHILS # BLD AUTO: 7.35 X10(3) UL (ref 1.5–7.7)
NEUTROPHILS NFR BLD AUTO: 73.9 %
NITRITE UR QL STRIP.AUTO: NEGATIVE
OSMOLALITY SERPL CALC.SUM OF ELEC: 276 MOSM/KG (ref 275–295)
PH UR: 7.5 (ref 5–8)
PLATELET # BLD AUTO: 509 10(3)UL (ref 150–450)
POTASSIUM SERPL-SCNC: 5.4 MMOL/L (ref 3.5–5.1)
PROT SERPL-MCNC: 6.8 G/DL (ref 5.7–8.2)
PROT UR-MCNC: NEGATIVE MG/DL
RBC # BLD AUTO: 3.29 X10(6)UL (ref 3.8–5.3)
SODIUM SERPL-SCNC: 134 MMOL/L (ref 136–145)
SP GR UR STRIP: 1.01 (ref 1–1.03)
TOTAL IRON BINDING CAPACITY: 317 UG/DL (ref 250–425)
TRANSFERRIN SERPL-MCNC: 236 MG/DL (ref 250–380)
UROBILINOGEN UR STRIP-ACNC: NORMAL
WBC # BLD AUTO: 10 X10(3) UL (ref 4–11)

## 2025-08-15 PROCEDURE — 85025 COMPLETE CBC W/AUTO DIFF WBC: CPT

## 2025-08-15 PROCEDURE — 1111F DSCHRG MED/CURRENT MED MERGE: CPT

## 2025-08-15 PROCEDURE — 3079F DIAST BP 80-89 MM HG: CPT

## 2025-08-15 PROCEDURE — 84466 ASSAY OF TRANSFERRIN: CPT

## 2025-08-15 PROCEDURE — 3075F SYST BP GE 130 - 139MM HG: CPT

## 2025-08-15 PROCEDURE — 3008F BODY MASS INDEX DOCD: CPT

## 2025-08-15 PROCEDURE — 1159F MED LIST DOCD IN RCRD: CPT

## 2025-08-15 PROCEDURE — 1160F RVW MEDS BY RX/DR IN RCRD: CPT

## 2025-08-15 PROCEDURE — 87086 URINE CULTURE/COLONY COUNT: CPT

## 2025-08-15 PROCEDURE — 81001 URINALYSIS AUTO W/SCOPE: CPT

## 2025-08-15 PROCEDURE — 80053 COMPREHEN METABOLIC PANEL: CPT

## 2025-08-15 PROCEDURE — 99215 OFFICE O/P EST HI 40 MIN: CPT

## 2025-08-15 PROCEDURE — 83540 ASSAY OF IRON: CPT

## 2025-08-15 PROCEDURE — 82728 ASSAY OF FERRITIN: CPT

## 2025-08-15 PROCEDURE — 36415 COLL VENOUS BLD VENIPUNCTURE: CPT

## 2025-08-15 RX ORDER — PROPRANOLOL HYDROCHLORIDE 120 MG/1
120 CAPSULE, EXTENDED RELEASE ORAL DAILY
COMMUNITY

## (undated) DIAGNOSIS — L30.9 DIFFUSE DERMATITIS: Primary | ICD-10-CM

## (undated) DIAGNOSIS — L40.9 PSORIASIS: ICD-10-CM

## (undated) NOTE — LETTER
AUTHORIZATION FOR SURGICAL OPERATION OR OTHER PROCEDURE    1. I hereby authorize Dr. Ramos, and [facility] staff assigned to my case to perform the following operation and/or procedure at the [facility] site:    Bilateral knee cortisone injection and left knee aspiration   _______________________________________________________________________________________________      _______________________________________________________________________________________________    2.  My physician has explained the nature and purpose of the operation or other procedure, possible alternative methods of treatment, the risks involved, and the possibility of complication to me.  I acknowledge that no guarantee has been made as to the result that may be obtained.  3.  I recognize that, during the course of this operation, or other procedure, unforseen conditions may necessitate additional or different procedure than those listed above.  I, therefore, further authorize and request that the above named physician, his/her physician assistants or designees perform such procedures as are, in his/her professional opinion, necessary and desirable.  4.  Any tissue or organs removed in the operation or other procedure may be disposed of by and at the discretion of the [facility] and [facility].  5.  I understand that in the event of a medical emergency, I will be transported by local paramedics to [facility] or other hospital emergency department.  6.  I certify that I have read and fully understand the above consent to operation and/or other procedure.    7.  I acknowledge that my physician has explained sedation/analgesia administration to me including the risks and benefits.  I consent to the administration of sedation/analgesia as may be necessary or desirable in the judgement of my physician.    Witness signature: ___________________________________________________ Date:   ______/______/_____                    Time:  ________ A.M.  P.M.       Patient Name:  ______________________________________________________  (please print)      Patient signature:  ___________________________________________________             Relationship to Patient:           []  Parent    Responsible person                          []  Spouse  In case of minor or                    [] Other  _____________   Incompetent name:  __________________________________________________                               (please print)      _____________      Responsible person  In case of minor or  Incompetent signature:  _______________________________________________    Statement of Physician  My signature below affirms that prior to the time of the procedure, I have explained to the patient and/or his/her guardian, the risks and benefits involved in the proposed treatment and any reasonable alternative to the proposed treatment.  I have also explained the risks and benefits involved in the refusal of the proposed treatment and have answered the patient's questions.                        Date:  ______/______/_______  Provider                      Signature:  __________________________________________________________       Time:  ___________ A.M    P.M.

## (undated) NOTE — ED AVS SNAPSHOT
[facility] Emergency Department    [address]     [place] [address]    Phone:  [phone]    Fax:  [phone]           Neva Neff   MRN: [id number]    Department:  [facility] Emergency Department   Date of Visit:  1/2/2017 and Class Registration line at [phone] or find a doctor online by visiting [email or web address].    IF THERE IS ANY CHANGE OR WORSENING OF YOUR CONDITION, CALL YOUR PRIMARY CARE PHYSICIAN AT ONCE OR RETURN IMMEDIATELY TO [address] [place] [address].     If

## (undated) NOTE — MR AVS SNAPSHOT
[address] [address]  [phone]               Thank you for choosing us for your health care visit with Ramila Paulino MD.  We are glad to serve you and happy to provide you with this sum leflunomide 20 MG Tabs   Take one daily. Commonly known as:  ARAVA           Meloxicam 15 MG Tabs   TAKE 1 TABLET(15 MG) BY MOUTH EVERY DAY           Nortriptyline HCl 75 MG Caps   Take 1 capsule (75 mg total) by mouth nightly.    Commonly known as:  QUYEN return for a follow-up Blood Pressure Check in 1 year.      Lifestyle Modification Recommendations:    Modification Recommendation   Weight Reduction Maintain normal body weight (body mass index 18.5-24.9 kg/m2)   DASH eating plan Adopt a diet rich in fruit

## (undated) NOTE — LETTER
AUTHORIZATION FOR SURGICAL OPERATION OR OTHER PROCEDURE    1. I hereby authorize , and [facility] staff assigned to my case to perform the following operation and/or procedure at the [facility] site:    Left knee cortisone injection  _______________________________________________________________________________________________      _______________________________________________________________________________________________    2.  My physician has explained the nature and purpose of the operation or other procedure, possible alternative methods of treatment, the risks involved, and the possibility of complication to me.  I acknowledge that no guarantee has been made as to the result that may be obtained.  3.  I recognize that, during the course of this operation, or other procedure, unforseen conditions may necessitate additional or different procedure than those listed above.  I, therefore, further authorize and request that the above named physician, his/her physician assistants or designees perform such procedures as are, in his/her professional opinion, necessary and desirable.  4.  Any tissue or organs removed in the operation or other procedure may be disposed of by and at the discretion of the [facility] and [facility].  5.  I understand that in the event of a medical emergency, I will be transported by local paramedics to [facility] or other hospital emergency department.  6.  I certify that I have read and fully understand the above consent to operation and/or other procedure.    7.  I acknowledge that my physician has explained sedation/analgesia administration to me including the risks and benefits.  I consent to the administration of sedation/analgesia as may be necessary or desirable in the judgement of my physician.    Witness signature: ___________________________________________________ Date:  ______/______/_____                     Time:  ________ A.M.  P.M.       Patient Name:  ______________________________________________________  (please print)      Patient signature:  ___________________________________________________             Relationship to Patient:           []  Parent    Responsible person                          []  Spouse  In case of minor or                    [] Other  _____________   Incompetent name:  __________________________________________________                               (please print)      _____________      Responsible person  In case of minor or  Incompetent signature:  _______________________________________________    Statement of Physician  My signature below affirms that prior to the time of the procedure, I have explained to the patient and/or his/her guardian, the risks and benefits involved in the proposed treatment and any reasonable alternative to the proposed treatment.  I have also explained the risks and benefits involved in the refusal of the proposed treatment and have answered the patient's questions.                        Date:  ______/______/_______  Provider                      Signature:  __________________________________________________________       Time:  ___________ A.M    P.M.

## (undated) NOTE — ED AVS SNAPSHOT
[facility] Emergency Department    [address]     [place] [address]    Phone:  [phone]    Fax:  [phone]           Hakeem Ladonna   MRN: [id number]    Department:  [facility] Emergency Department   Date of Visit:  1/2/2017 NAUSEA AND VOMITING, HOW TO CONTROL (ENGLISH)    CHEST PAIN, UNCERTAIN CAUSE (ENGLISH)      Disclosure     Insurance plans vary and the physician(s) referred by the ER may not be covered by your plan.  Please contact your insurance company to determine cov CARE PHYSICIAN AT ONCE OR RETURN IMMEDIATELY TO THE EMERGENCY DEPARTMENT. If you have been prescribed any medication(s), please fill your prescription right away and begin taking the medication(s) as directed.   If you believe that any of the medications Patient [address] to help you get signed up for insurance coverage. Patient [address] is a Federal Navigator program that can help with your Affordable Care Act coverage, as well as all types of Medicaid plans.   To get signed up and covere

## (undated) NOTE — MR AVS SNAPSHOT
[address] [address]  [phone]               Thank you for choosing us for your health care visit with Gagan Leary MD.  We are glad to serve you and happy to provide you with this sum * Notice: This list has 2 medication(s) that are the same as other medications prescribed for you. Read the directions carefully, and ask your doctor or other care provider to review them with you.          Where to Get Your Medications      These medicat

## (undated) NOTE — MR AVS SNAPSHOT
[facility] [address] [place] [address]  [phone]               Thank you for choosing us for your health care visit with Nam Peguero DO. We are glad to serve you and happy to provide you with this summary of your visit.   Please he Take 1 tablet (5 mg total) by mouth daily.    Commonly known as:  DITROPAN-XL           predniSONE 5 MG Tabs   TAKE 1 TABLET BY MOUTH EVERY MORNING   Commonly known as:  DELTASONE           Timolol Maleate 10 MG Tabs   Take 1 tablet by mouth  twice a day Call [phone] for help. [place] is NOT to be used for urgent needs. For medical emergencies, dial 911.            Visit [facility] online at  [email or web address]